# Patient Record
Sex: MALE | Race: WHITE | NOT HISPANIC OR LATINO | Employment: OTHER | URBAN - METROPOLITAN AREA
[De-identification: names, ages, dates, MRNs, and addresses within clinical notes are randomized per-mention and may not be internally consistent; named-entity substitution may affect disease eponyms.]

---

## 2017-02-02 RX ORDER — METFORMIN HYDROCHLORIDE 500 MG/1
TABLET, EXTENDED RELEASE ORAL
Qty: 360 TABLET | Refills: 0 | Status: SHIPPED | OUTPATIENT
Start: 2017-02-02 | End: 2017-05-01 | Stop reason: SDUPTHER

## 2017-02-13 ENCOUNTER — PATIENT OUTREACH (OUTPATIENT)
Dept: ADMINISTRATIVE | Facility: HOSPITAL | Age: 62
End: 2017-02-13

## 2017-02-13 NOTE — LETTER
February 21, 2017    Mikey Ramirez  [address] [facility] [address]             [facility]  [address]  [facility] [address]  Phone: [phone] Dear Mr. Ramirez:    We have tried to reach you by mychart unsuccessfully.          [facility] is committed to your overall health.  To help you get the most out of each of your visits, we will review your information to make sure you are up to date on all of your recommended tests and/or procedures.       Dr. Ortega has found that you may be due for One time Hepatitis C screening lab test ( a viral condition that harms the liver), tetanus vaccine, colonoscopy, annual dilated eye exam, flu vaccine, hemoglobin A1C, lipid panel.     If you have had any of the above done at another facility, please bring the records or information with you so that your record at [facility] will be complete.     If you are currently taking medication, please bring it with you to your appointment for review.     Nnamdi Avilez LPN Clinical Care Coordinator   [facility] [address] [facility] [address]   [place] [address]   [phone] (p)   [phone] (f)

## 2017-02-16 ENCOUNTER — PATIENT MESSAGE (OUTPATIENT)
Dept: FAMILY MEDICINE | Facility: CLINIC | Age: 62
End: 2017-02-16

## 2017-03-06 RX ORDER — ESOMEPRAZOLE MAGNESIUM 40 MG/1
CAPSULE, DELAYED RELEASE ORAL
Qty: 90 CAPSULE | Refills: 0 | Status: SHIPPED | OUTPATIENT
Start: 2017-03-06 | End: 2017-06-02 | Stop reason: SDUPTHER

## 2017-03-14 RX ORDER — PRAVASTATIN SODIUM 40 MG/1
TABLET ORAL
Qty: 90 TABLET | Refills: 0 | Status: SHIPPED | OUTPATIENT
Start: 2017-03-14 | End: 2017-09-19 | Stop reason: SDUPTHER

## 2017-03-23 RX ORDER — LISINOPRIL 10 MG/1
TABLET ORAL
Qty: 90 TABLET | Refills: 0 | Status: SHIPPED | OUTPATIENT
Start: 2017-03-23 | End: 2017-06-21 | Stop reason: SDUPTHER

## 2017-04-21 RX ORDER — INSULIN GLARGINE 100 [IU]/ML
INJECTION, SOLUTION SUBCUTANEOUS
Qty: 45 ML | Refills: 1 | Status: SHIPPED | OUTPATIENT
Start: 2017-04-21 | End: 2017-10-18 | Stop reason: SDUPTHER

## 2017-05-02 RX ORDER — METFORMIN HYDROCHLORIDE 500 MG/1
TABLET, EXTENDED RELEASE ORAL
Qty: 360 TABLET | Refills: 0 | Status: SHIPPED | OUTPATIENT
Start: 2017-05-02 | End: 2018-02-11 | Stop reason: SDUPTHER

## 2017-05-11 ENCOUNTER — PATIENT OUTREACH (OUTPATIENT)
Dept: ADMINISTRATIVE | Facility: HOSPITAL | Age: 62
End: 2017-05-11

## 2017-05-11 NOTE — LETTER
May 17, 2017    Mikey Ramirez  [address] [facility] [address]             [facility]  [address]  [facility] [address]  Phone: [phone] Dear Mr. Ramirez:    We have tried to reach you by mychart unsuccessfully.    [facility] committed to your overall health.  To help you get the most out of each of your visits, we will review your information to make sure you are up to date on all of your recommended tests and/or procedures.       Dr. Alphonso Ortega has found that you may be due for your fasting cholesterol and diabetes labs, your annual diabetic eye and foot exams, colon cancer screening, hepatitis C screening, and possibly a tetanus immunization.     If you have had any of the above done at another facility, please bring the records or information with you so that your record at [facility] will be complete.  If you would like to schedule any of these, please contact me.     If you are currently taking medication, please bring it with you to your appointment for review.     If you have any questions or concerns, please don't hesitate to call.    Thank you for letting us care for you,  Mally Paige LPN Clinical Care Coordinator  [facility] [place]  [phone]

## 2017-06-02 RX ORDER — ESOMEPRAZOLE MAGNESIUM 40 MG/1
CAPSULE, DELAYED RELEASE ORAL
Qty: 90 CAPSULE | Refills: 0 | Status: SHIPPED | OUTPATIENT
Start: 2017-06-02 | End: 2017-08-31 | Stop reason: SDUPTHER

## 2017-06-12 RX ORDER — PRAVASTATIN SODIUM 40 MG/1
TABLET ORAL
Qty: 90 TABLET | Refills: 1 | Status: SHIPPED | OUTPATIENT
Start: 2017-06-12 | End: 2017-12-09 | Stop reason: SDUPTHER

## 2017-06-21 RX ORDER — LISINOPRIL 10 MG/1
TABLET ORAL
Qty: 90 TABLET | Refills: 0 | Status: SHIPPED | OUTPATIENT
Start: 2017-06-21 | End: 2017-09-19 | Stop reason: SDUPTHER

## 2017-07-03 RX ORDER — PEN NEEDLE, DIABETIC 31 GX5/16"
NEEDLE, DISPOSABLE MISCELLANEOUS
Qty: 100 EACH | Refills: 0 | Status: SHIPPED | OUTPATIENT
Start: 2017-07-03 | End: 2017-10-01 | Stop reason: SDUPTHER

## 2017-08-11 ENCOUNTER — PATIENT OUTREACH (OUTPATIENT)
Dept: ADMINISTRATIVE | Facility: HOSPITAL | Age: 62
End: 2017-08-11

## 2017-08-11 NOTE — LETTER
August 15, 2017    Mikey Ramirez  [address] [facility] [address]             [facility]  [address]  [facility] [address]  Phone: [phone] Dear Mr. Ramirez:    We have tried to reach you by mychart unsuccessfully.    [facility] committed to your overall health.  To help you get the most out of each of your visits, we will review your information to make sure you are up to date on all of your recommended tests and/or procedures.       Dr. Alphonso Ortega has found that you may be due for your fasting cholesterol and diabetes labs, a urine test for your kidneys, your annual diabetic eye and foot exams, hepatitis C screening, colon cancer screening, and possibly a tetanus immunization.     If you have had any of the above done at another facility, please bring the records or information with you so that your record at [facility] will be complete.  If you would like to schedule any of these, please contact me.     If you are currently taking medication, please bring it with you to your appointment for review.     If you have any questions or concerns, please don't hesitate to call.    Thank you for letting us care for you,  Mally Paige LPN Clinical Care Coordinator  [facility]  [phone]

## 2017-08-31 RX ORDER — ESOMEPRAZOLE MAGNESIUM 40 MG/1
CAPSULE, DELAYED RELEASE ORAL
Qty: 90 CAPSULE | Refills: 0 | Status: SHIPPED | OUTPATIENT
Start: 2017-08-31 | End: 2021-06-10

## 2017-09-19 ENCOUNTER — TELEPHONE (OUTPATIENT)
Dept: FAMILY MEDICINE | Facility: CLINIC | Age: 62
End: 2017-09-19

## 2017-09-19 DIAGNOSIS — E78.5 HYPERLIPIDEMIA, UNSPECIFIED HYPERLIPIDEMIA TYPE: ICD-10-CM

## 2017-09-19 DIAGNOSIS — I10 ESSENTIAL HYPERTENSION: ICD-10-CM

## 2017-09-19 RX ORDER — LISINOPRIL 10 MG/1
TABLET ORAL
Qty: 30 TABLET | Refills: 0 | Status: SHIPPED | OUTPATIENT
Start: 2017-09-19 | End: 2017-10-05 | Stop reason: SDUPTHER

## 2017-09-19 NOTE — PROGRESS NOTES
Refill Authorization Note     is requesting a refill authorization.    Brief assessment and rational for refill: APPROVE: prr  Name of medication: LISINOPRIL TABS 10MG  How patient will take medication: t1t po daily   Amount/Quantity of medication ordered: 30d           Refills Authorized: Yes  If authorized number of refills: 0     Medication-related problems identified: Medication education needs  Medication Therapy Plan: Pt needs urgent labs.  Will approve 30d supply only.  Pending new CMP.  Will f/u in 1 mo.  BP uncontrolled.  Per chart review pt is difficult to schedule. D'c duplicate meds  Comments:   Lab Results   Component Value Date    CREATININE 0.8 09/24/2015    BUN 19 09/24/2015     09/24/2015    K 4.7 09/24/2015     09/24/2015    CO2 26 09/24/2015      BP Readings from Last 3 Encounters:   05/19/16 (!) 160/70   09/24/15 (!) 146/86   01/09/15 (!) 152/94

## 2017-09-20 NOTE — TELEPHONE ENCOUNTER
Medication refilled. Please make certain the patient comes in for labs before his scheduled appointment next month.

## 2017-09-20 NOTE — TELEPHONE ENCOUNTER
Spoke w/ pt. Informed pt about lab orders in place. Pt verbalized understanding. Pt then stated that he will call the [facility] later to schedule his appointment b/c he did not know his schedule at this time.

## 2017-10-02 RX ORDER — PEN NEEDLE, DIABETIC 31 GX5/16"
NEEDLE, DISPOSABLE MISCELLANEOUS
Qty: 100 EACH | Refills: 3 | Status: SHIPPED | OUTPATIENT
Start: 2017-10-02 | End: 2021-06-10

## 2017-10-02 NOTE — PROGRESS NOTES
Refill Authorization Note     is requesting a refill authorization.    Brief assessment and rational for refill: APPROVE: prr  Name of medication: BD PEN WEI UF SHORT 8MM 100'S 31G5/16  How patient will take medication: test daily   Amount/Quantity of medication ordered: 100  Medication reconciliation completed: No        Refills Authorized: Yes  If authorized number of refills: 3        Medication Therapy Plan: Last a1c uncontrolled.  Will approve for 12mo   Comments:   Lab Results   Component Value Date    HGBA1C 9.6 (H) 05/19/2016

## 2017-10-05 ENCOUNTER — TELEPHONE (OUTPATIENT)
Dept: FAMILY MEDICINE | Facility: CLINIC | Age: 62
End: 2017-10-05

## 2017-10-05 DIAGNOSIS — I10 ESSENTIAL HYPERTENSION: ICD-10-CM

## 2017-10-05 NOTE — TELEPHONE ENCOUNTER
Received fax from [employer] stating that Nexium is a non preferred medication.  1. Has patient ever tried Prilosec or Protonix?  2. If pt has not tried either which would he prefer?

## 2017-10-05 NOTE — LETTER
October 11, 2017    Mikey Ramirez  [address] [facility] [address]             [facility]  [address]  [facility] [address]  Phone: [phone]  Fax: [phone] Dear Mr. Ramirez:    We have tried to reach you in regards to  your prescription Nexium. It is no longer covered by your insurance. Can you please give our office a call.     If you have any questions or concerns, please don't hesitate to call.    Sincerely,        Lawanda Rajan MA

## 2017-10-06 RX ORDER — LISINOPRIL 10 MG/1
TABLET ORAL
Qty: 30 TABLET | Refills: 0 | Status: SHIPPED | OUTPATIENT
Start: 2017-10-06 | End: 2018-02-11 | Stop reason: SDUPTHER

## 2017-10-06 NOTE — PROGRESS NOTES
Refill Authorization Note     is requesting a refill authorization.    Brief assessment and rational for refill: APPROVE: prr  Name of medication: LISINOPRIL TABS 10MG  How patient will take medication: t1t po daily   Amount/Quantity of medication ordered: 30d   Medication reconciliation completed: No        Refills Authorized: Yes  If authorized number of refills: 0        Medication Therapy Plan: Pt needs updated labs and is supposed to be informed.  Has scheduled visit and in 3 weeks.  Will approve for 30d.  Comments:   Lab Results   Component Value Date    CREATININE 0.8 09/24/2015    BUN 19 09/24/2015     09/24/2015    K 4.7 09/24/2015     09/24/2015    CO2 26 09/24/2015      BP Readings from Last 3 Encounters:   05/19/16 (!) 160/70   09/24/15 (!) 146/86   01/09/15 (!) 152/94

## 2017-10-16 ENCOUNTER — PATIENT OUTREACH (OUTPATIENT)
Dept: ADMINISTRATIVE | Facility: HOSPITAL | Age: 62
End: 2017-10-16

## 2017-10-16 NOTE — LETTER
October 23, 2017    Mikey Ramirez  [address] [facility] [address]             [facility]  [address]  [facility] [address]  Phone: [phone] Dear Mr. Ramirez:    We have tried to reach you by mychart unsuccessfully.    [facility] committed to your overall health.  To help you get the most out of each of your visits, we will review your information to make sure you are up to date on all of your recommended tests and/or procedures.       Dr. Alphonso Ortega has found that your chart shows you may be due for your fasting cholesterol and diabetes labs, a urine test for your kidneys, your annual diabetic eye and foot exams, hepatitis C screening, colon cancer screening, and possibly some immunizations (tetanus, flu, and shingles).     If you have had any of the above done at another facility, please bring the records or information with you so that your record at [facility] will be complete.  If you would like to schedule any of these, please contact me.     If you are currently taking medication, please bring it with you to your appointment for review.     If you have any questions or concerns, please don't hesitate to call.    Thank you for letting us care for you,  Mally Paige LPN Clinical Care Coordinator  [facility] [place]  [phone]

## 2017-10-16 NOTE — PROGRESS NOTES
Pre-visit Health Maintenance Review    Patient due for diabetic eye exam.      Upcoming Primary Care visit: Jordan 3:40 - too late to schedule.

## 2017-10-18 RX ORDER — INSULIN GLARGINE 100 [IU]/ML
INJECTION, SOLUTION SUBCUTANEOUS
Qty: 45 ML | Refills: 0 | Status: SHIPPED | OUTPATIENT
Start: 2017-10-18 | End: 2018-01-16 | Stop reason: SDUPTHER

## 2017-12-11 ENCOUNTER — PATIENT OUTREACH (OUTPATIENT)
Dept: ADMINISTRATIVE | Facility: HOSPITAL | Age: 62
End: 2017-12-11

## 2017-12-11 DIAGNOSIS — E11.00 TYPE 2 DIABETES MELLITUS WITH HYPEROSMOLARITY WITHOUT COMA, UNSPECIFIED LONG TERM INSULIN USE STATUS: Primary | ICD-10-CM

## 2017-12-11 RX ORDER — PRAVASTATIN SODIUM 40 MG/1
TABLET ORAL
Qty: 90 TABLET | Refills: 0 | Status: SHIPPED | OUTPATIENT
Start: 2017-12-11 | End: 2018-02-21 | Stop reason: SDUPTHER

## 2017-12-11 NOTE — PROGRESS NOTES
Pre-visit Health Maintenance Review    Patient due for diabetic eye exam.  Called to schedule retinal camera appointment in [place] if not already done outside of [facility].    Upcoming Primary Care visit: Jordan 12/26/17 8am

## 2017-12-11 NOTE — PROGRESS NOTES
Refill Authorization Note     is requesting a refill authorization.    Brief assessment and rationale for refill: APPROVE: pt needs labs  Amount/Quantity of medication ordered: 90d         Refills Authorized: Yes  If authorized number of refills: 0        Medication-related problems identified: Requires labs  Medication Therapy Plan: Will RTC for annual soon; lipids and other labs already ordered; NTBS   Name and strength of medication: PRAVASTATIN TABS 40MG  How patient will take medication: t1t po daily   Medication reconciliation completed: No  Comments:   Lab Results   Component Value Date    CHOL 169 09/24/2015    CHOL 223 (H) 01/09/2015    CHOL 150 09/30/2013     Lab Results   Component Value Date    HDL 54 09/24/2015    HDL 62 01/09/2015    HDL 52 09/30/2013     Lab Results   Component Value Date    LDLCALC 100.2 09/24/2015    LDLCALC 142.0 01/09/2015    LDLCALC 88.0 09/30/2013     Lab Results   Component Value Date    TRIG 74 09/24/2015    TRIG 95 01/09/2015    TRIG 50 09/30/2013     Lab Results   Component Value Date    CHOLHDL 32.0 09/24/2015    CHOLHDL 27.8 01/09/2015    CHOLHDL 34.7 09/30/2013

## 2017-12-12 ENCOUNTER — PATIENT MESSAGE (OUTPATIENT)
Dept: ADMINISTRATIVE | Facility: HOSPITAL | Age: 62
End: 2017-12-12

## 2017-12-12 DIAGNOSIS — Z11.59 ENCOUNTER FOR HEPATITIS C SCREENING TEST FOR LOW RISK PATIENT: ICD-10-CM

## 2017-12-12 DIAGNOSIS — E11.00 TYPE 2 DIABETES MELLITUS WITH HYPEROSMOLARITY WITHOUT COMA, UNSPECIFIED LONG TERM INSULIN USE STATUS: Primary | ICD-10-CM

## 2017-12-12 DIAGNOSIS — Z12.11 COLON CANCER SCREENING: ICD-10-CM

## 2017-12-21 ENCOUNTER — TELEPHONE (OUTPATIENT)
Dept: FAMILY MEDICINE | Facility: CLINIC | Age: 62
End: 2017-12-21

## 2018-01-03 ENCOUNTER — PATIENT MESSAGE (OUTPATIENT)
Dept: GASTROENTEROLOGY | Facility: CLINIC | Age: 63
End: 2018-01-03

## 2018-01-16 NOTE — LETTER
January 23, 2018    Mikey Ramirez  [address] [facility] [address]             [facility]  [address]  [facility] [address]  Phone: [phone]  Fax: [phone] Dear Mr. Ramirez:    We have been trying to contact you to schedule labs before your follow up appointment. Please contact the office at [phone] to schedule.       If you have any questions or concerns, please don't hesitate to call.    Sincerely,        Batsheva Eldridge MA

## 2018-01-17 RX ORDER — INSULIN GLARGINE 100 [IU]/ML
INJECTION, SOLUTION SUBCUTANEOUS
Qty: 45 ML | Refills: 0 | Status: SHIPPED | OUTPATIENT
Start: 2018-01-17 | End: 2021-06-10

## 2018-01-17 NOTE — PROGRESS NOTES
Refill Authorization Note     is requesting a refill authorization.    Brief assessment and rationale for refill: APPROVE: pt needs all labs  Amount/Quantity of medication ordered: 90d        Refills Authorized: Yes  If authorized number of refills: 0        Medication-related problems identified: Requires labs  Medication Therapy Plan: Outdated for all labs; will RTC soon for annual.  CMP; hba1c; lipids NTBS already ordered previously  Name and strength of medication: LANTUS SOLOSTAR PEN 100U/ML  How patient will take medication: 45 units daily  Medication reconciliation completed: No  Comments:  Lab Results   Component Value Date    HGBA1C 9.6 (H) 05/19/2016      Lab Results   Component Value Date    CREATININE 0.8 09/24/2015    BUN 19 09/24/2015     09/24/2015    K 4.7 09/24/2015     09/24/2015    CO2 26 09/24/2015

## 2018-01-18 NOTE — TELEPHONE ENCOUNTER
Attempted to contact pt to inform him that he needs to schedule labs prior to his follow up scheduled for 03/05/2018.    No answer; left message to return call to schedule.

## 2018-01-22 NOTE — TELEPHONE ENCOUNTER
Attempted to contact pt to schedule labs prior to follow up scheduled for 03/05/2018/    No answer; left message to return call to schedule.

## 2018-01-23 NOTE — TELEPHONE ENCOUNTER
Attempted to contact pt.     No answer; left message to return call to schedule labs.     3 attempts made.     Letter sent to pt.

## 2018-02-11 DIAGNOSIS — I10 ESSENTIAL HYPERTENSION: ICD-10-CM

## 2018-02-11 NOTE — LETTER
February 16, 2018    Mikey Ramirez  [address] [facility] [address]             [facility]  [address]  [facility] [address]  Phone: [phone]  Fax: [phone] Dear Mr. Ramirez:    We have been trying to contact you to schedule labs prior to your follow up appointment scheduled for 03/05/2018. Please contact the office at [phone] to schedule labs.       If you have any questions or concerns, please don't hesitate to call.    Sincerely,        Batsheva Eldridge MA

## 2018-02-12 RX ORDER — METFORMIN HYDROCHLORIDE 500 MG/1
1000 TABLET, EXTENDED RELEASE ORAL 2 TIMES DAILY
Qty: 120 TABLET | Refills: 0 | Status: SHIPPED | OUTPATIENT
Start: 2018-02-12 | End: 2021-06-10

## 2018-02-12 RX ORDER — LISINOPRIL 10 MG/1
10 TABLET ORAL DAILY
Qty: 30 TABLET | Refills: 0 | Status: SHIPPED | OUTPATIENT
Start: 2018-02-12 | End: 2021-06-10

## 2018-02-12 NOTE — PROGRESS NOTES
Refill Authorization Note     is requesting a refill authorization.    Brief assessment and rationale for refill: APPROVE; needs labs done and also may be nonadherent  Amount/Quantity of medication ordered: 90d        Refills Authorized: Yes  If authorized number of refills: 0        Medication-related problems identified: Requires labs  Medication Therapy Plan: Labs already ordered; pt didnt get them done; NTBS.  Converting to 30d protocols  Name and strength of medication: metformin 500 mg / lisinopril  How patient will take medication: UTD  Medication reconciliation completed: No  Comments:   Lab Results   Component Value Date    CREATININE 0.8 09/24/2015    BUN 19 09/24/2015     09/24/2015    K 4.7 09/24/2015     09/24/2015    CO2 26 09/24/2015      Lab Results   Component Value Date    HGBA1C 9.6 (H) 05/19/2016      BP Readings from Last 3 Encounters:   05/19/16 (!) 160/70   09/24/15 (!) 146/86   01/09/15 (!) 152/94

## 2018-02-14 NOTE — TELEPHONE ENCOUNTER
Attempted to contact pt to schedule lab apt prior to follow up on 03/05/2018.     No answer; left message to return call to schedule.

## 2018-02-15 NOTE — TELEPHONE ENCOUNTER
Attempted to contact pt to schedule labs prior to follow up apt.     No answer; left message to return call to schedule.

## 2018-02-16 NOTE — TELEPHONE ENCOUNTER
Attempted to contact pt to schedule labs prior to follow up.    No answer; left message to return call.     Sent letter to pt.

## 2018-02-19 ENCOUNTER — PATIENT OUTREACH (OUTPATIENT)
Dept: ADMINISTRATIVE | Facility: HOSPITAL | Age: 63
End: 2018-02-19

## 2018-02-19 NOTE — PROGRESS NOTES
Pre-visit Health Maintenance Review    Patient due for diabetic eye exam.  Called to schedule retinal camera appointment in [place] if not already done outside of [facility].    Upcoming Primary Care visit: [place] 3/5/17 8:20am

## 2018-02-21 NOTE — PROGRESS NOTES
Refill Authorization Note     is requesting a refill authorization.    Brief assessment and rationale for refill: DEFER: needs labs did not show to lab appt and also letter was sent out  Amount/Quantity of medication ordered: 30d        Refills Authorized: Yes  If authorized number of refills: 0        Medication-related problems identified: Non-adherence (knowledge deficit) non-intentional  Medication Therapy Plan: Pt no show'd lab and not responding to phone calls to scheduled  Name and strength of medication: PRAVASTATIN TABS 40MG  How patient will take medication: t1t po daily   Medication reconciliation completed: No  Comments:   Lab Results   Component Value Date    CHOL 169 09/24/2015    CHOL 223 (H) 01/09/2015    CHOL 150 09/30/2013     Lab Results   Component Value Date    HDL 54 09/24/2015    HDL 62 01/09/2015    HDL 52 09/30/2013     Lab Results   Component Value Date    LDLCALC 100.2 09/24/2015    LDLCALC 142.0 01/09/2015    LDLCALC 88.0 09/30/2013     Lab Results   Component Value Date    TRIG 74 09/24/2015    TRIG 95 01/09/2015    TRIG 50 09/30/2013     Lab Results   Component Value Date    CHOLHDL 32.0 09/24/2015    CHOLHDL 27.8 01/09/2015    CHOLHDL 34.7 09/30/2013

## 2018-02-22 ENCOUNTER — PATIENT MESSAGE (OUTPATIENT)
Dept: ADMINISTRATIVE | Facility: HOSPITAL | Age: 63
End: 2018-02-22

## 2018-02-22 DIAGNOSIS — E11.00 TYPE 2 DIABETES MELLITUS WITH HYPEROSMOLARITY WITHOUT COMA, UNSPECIFIED LONG TERM INSULIN USE STATUS: Primary | ICD-10-CM

## 2018-02-23 RX ORDER — PRAVASTATIN SODIUM 40 MG/1
40 TABLET ORAL NIGHTLY
Qty: 30 TABLET | Refills: 0 | Status: SHIPPED | OUTPATIENT
Start: 2018-02-23 | End: 2021-06-10

## 2018-02-26 ENCOUNTER — OFFICE VISIT (OUTPATIENT)
Dept: OPTOMETRY | Facility: CLINIC | Age: 63
End: 2018-02-26
Payer: OTHER GOVERNMENT

## 2018-02-26 ENCOUNTER — LAB VISIT (OUTPATIENT)
Dept: LAB | Facility: HOSPITAL | Age: 63
End: 2018-02-26
Attending: FAMILY MEDICINE
Payer: OTHER GOVERNMENT

## 2018-02-26 DIAGNOSIS — Z11.59 ENCOUNTER FOR HEPATITIS C SCREENING TEST FOR LOW RISK PATIENT: ICD-10-CM

## 2018-02-26 DIAGNOSIS — H52.203 HYPEROPIA OF BOTH EYES WITH ASTIGMATISM: ICD-10-CM

## 2018-02-26 DIAGNOSIS — H52.03 HYPEROPIA OF BOTH EYES WITH ASTIGMATISM: ICD-10-CM

## 2018-02-26 DIAGNOSIS — Z13.5 GLAUCOMA SCREENING: ICD-10-CM

## 2018-02-26 DIAGNOSIS — E78.5 HYPERLIPIDEMIA, UNSPECIFIED HYPERLIPIDEMIA TYPE: ICD-10-CM

## 2018-02-26 DIAGNOSIS — E11.9 DIABETES MELLITUS TYPE 2 WITHOUT RETINOPATHY: Primary | ICD-10-CM

## 2018-02-26 DIAGNOSIS — I10 ESSENTIAL HYPERTENSION: ICD-10-CM

## 2018-02-26 DIAGNOSIS — Z96.1 BILATERAL PSEUDOPHAKIA: ICD-10-CM

## 2018-02-26 DIAGNOSIS — H43.813 POSTERIOR VITREOUS DETACHMENT, BILATERAL: ICD-10-CM

## 2018-02-26 LAB
ALBUMIN SERPL BCP-MCNC: 3.4 G/DL
ALP SERPL-CCNC: 69 U/L
ALT SERPL W/O P-5'-P-CCNC: 64 U/L
ANION GAP SERPL CALC-SCNC: 9 MMOL/L
AST SERPL-CCNC: 38 U/L
BILIRUB SERPL-MCNC: 0.5 MG/DL
BUN SERPL-MCNC: 17 MG/DL
CALCIUM SERPL-MCNC: 9.8 MG/DL
CHLORIDE SERPL-SCNC: 105 MMOL/L
CHOLEST SERPL-MCNC: 179 MG/DL
CHOLEST/HDLC SERPL: 3 {RATIO}
CO2 SERPL-SCNC: 26 MMOL/L
CREAT SERPL-MCNC: 0.7 MG/DL
EST. GFR  (AFRICAN AMERICAN): >60 ML/MIN/1.73 M^2
EST. GFR  (NON AFRICAN AMERICAN): >60 ML/MIN/1.73 M^2
ESTIMATED AVG GLUCOSE: 278 MG/DL
GLUCOSE SERPL-MCNC: 157 MG/DL
HBA1C MFR BLD HPLC: 11.3 %
HCV AB SERPL QL IA: NEGATIVE
HDLC SERPL-MCNC: 60 MG/DL
HDLC SERPL: 33.5 %
LDLC SERPL CALC-MCNC: 99.4 MG/DL
NONHDLC SERPL-MCNC: 119 MG/DL
POTASSIUM SERPL-SCNC: 4.6 MMOL/L
PROT SERPL-MCNC: 6.7 G/DL
SODIUM SERPL-SCNC: 140 MMOL/L
TRIGL SERPL-MCNC: 98 MG/DL

## 2018-02-26 PROCEDURE — 83036 HEMOGLOBIN GLYCOSYLATED A1C: CPT

## 2018-02-26 PROCEDURE — 99999 PR PBB SHADOW E&M-EST. PATIENT-LVL III: CPT | Mod: PBBFAC,,, | Performed by: OPTOMETRIST

## 2018-02-26 PROCEDURE — 36415 COLL VENOUS BLD VENIPUNCTURE: CPT | Mod: PO

## 2018-02-26 PROCEDURE — 80061 LIPID PANEL: CPT

## 2018-02-26 PROCEDURE — 99213 OFFICE O/P EST LOW 20 MIN: CPT | Mod: PBBFAC,PO | Performed by: OPTOMETRIST

## 2018-02-26 PROCEDURE — 92004 COMPRE OPH EXAM NEW PT 1/>: CPT | Mod: S$PBB,,, | Performed by: OPTOMETRIST

## 2018-02-26 PROCEDURE — 86803 HEPATITIS C AB TEST: CPT

## 2018-02-26 PROCEDURE — 80053 COMPREHEN METABOLIC PANEL: CPT

## 2018-02-26 PROCEDURE — 92015 DETERMINE REFRACTIVE STATE: CPT | Mod: ,,, | Performed by: OPTOMETRIST

## 2018-02-26 NOTE — PROGRESS NOTES
"HPI     Concerns About Ocular Health    Additional comments: DLE 9-12 (donezoe)             Diabetic Eye Exam    Additional comments: BSL fluctuates, uncontrolled           Blurred Vision    Additional comments: at near only -- "takes longer to focus after wearing   OTC readers all day"           Comments   Hemoglobin A1C       Date                     Value               Ref Range             Status                05/19/2016               9.6 (H)             4.5 - 6.2 %           Final                 09/24/2015               9.6 (H)             4.5 - 6.2 %           Final                 01/09/2015               8.8 (H)             4.5 - 6.2 %           Final            ----------    BP reported "havent' done it in a while"         Last edited by DANIELLA Castillo, OD on 2/26/2018  9:16 AM. (History)        ROS     Positive for: Eyes    Negative for: Constitutional, Gastrointestinal, Neurological, Skin,   Genitourinary, Musculoskeletal, HENT, Endocrine, Cardiovascular,   Respiratory, Psychiatric, Allergic/Imm, Heme/Lymph    Last edited by DANIELLA Castillo, OD on 2/26/2018  9:09 AM. (History)        Assessment /Plan     For exam results, see Encounter Report.    Diabetes mellitus type 2 without retinopathy    Posterior vitreous detachment, bilateral    Glaucoma screening    Bilateral pseudophakia    Hyperopia of both eyes with astigmatism      1. No ret/ no csme, gave info, control glucose, annual DFE  2. RD precautions given  3. Not suspect  4. Stable OU, s/p Yag  5. Updated specs rx, gave copy, fill prn    Discussed and educated patient on current findings /plan.  RTC 1 year, prn if any changes / issues                   "

## 2018-02-26 NOTE — LETTER
February 26, 2018      Alphonso Ortega MD  [address] [facility] [address] [place] [address]           [facility]  [address] [facility] [address] [place] [address]  Phone: [phone]  Fax: [phone]          Patient: Mikey Ramirez   MR Number: [id number]   YOB: 1955   Date of Visit: 2/26/2018       Dear Dr. Alphonso Ortega:    Thank you for referring Mikey Ramirez to me for evaluation. Attached you will find relevant portions of my assessment and plan of care.    If you have questions, please do not hesitate to call me. I look forward to following Mikey Ramirez along with you.    Sincerely,    DANIELLA Castillo, OD    Enclosure  CC:  No Recipients    If you would like to receive this communication electronically, please contact [email or web address] or [phone] to request more information on [employer] access.    For providers and/or their staff who would like to refer a patient to [facility], please contact us through our one-stop-shop provider referral line, [facility] , at [phone].    If you feel you have received this communication in error or would no longer like to receive these types of communications, please e-mail [email or web address]

## 2018-02-26 NOTE — TELEPHONE ENCOUNTER
Please schedule patient for labs (A1c, lipids, CMP).  Pt did not respond to phone calls and a letter was sent out, of note. (I'm not sure if Dr. Ortega wants to try again or to wait until the office visit).  I will clarify the next time I see him. Thanks

## 2018-03-02 ENCOUNTER — PATIENT MESSAGE (OUTPATIENT)
Dept: OPTOMETRY | Facility: CLINIC | Age: 63
End: 2018-03-02

## 2018-03-11 DIAGNOSIS — I10 ESSENTIAL HYPERTENSION: ICD-10-CM

## 2018-03-12 RX ORDER — LISINOPRIL 10 MG/1
TABLET ORAL
Qty: 30 TABLET | Refills: 0 | OUTPATIENT
Start: 2018-03-12

## 2018-03-12 RX ORDER — METFORMIN HYDROCHLORIDE 500 MG/1
TABLET, EXTENDED RELEASE ORAL
Qty: 120 TABLET | Refills: 0 | OUTPATIENT
Start: 2018-03-12

## 2018-03-12 RX ORDER — PRAVASTATIN SODIUM 40 MG/1
40 TABLET ORAL DAILY
Qty: 30 TABLET | Refills: 0 | OUTPATIENT
Start: 2018-03-12

## 2018-03-12 NOTE — PROGRESS NOTES
Refill Authorization Note     is requesting a refill authorization.    Brief assessment and rationale for refill: DEFER: pt cancelled appointment; labs updated; out of control  Amount/Quantity of medication ordered: 90d         Refills Authorized: Yes  If authorized number of refills: 0        Medication-related problems identified:   Requires appointment  Medication education needs  Dose adjustment  Medication Therapy Plan: Labs updated; will que protocol to schedule appt for 30d  Name and strength of medication: pravastatin / lisinopril / metformin  How patient will take medication: UTD  Medication reconciliation completed: No  Comments:   Lab Results   Component Value Date    CHOL 179 02/26/2018    CHOL 169 09/24/2015    CHOL 223 (H) 01/09/2015     Lab Results   Component Value Date    HDL 60 02/26/2018    HDL 54 09/24/2015    HDL 62 01/09/2015     Lab Results   Component Value Date    LDLCALC 99.4 02/26/2018    LDLCALC 100.2 09/24/2015    LDLCALC 142.0 01/09/2015     Lab Results   Component Value Date    TRIG 98 02/26/2018    TRIG 74 09/24/2015    TRIG 95 01/09/2015     Lab Results   Component Value Date    CHOLHDL 33.5 02/26/2018    CHOLHDL 32.0 09/24/2015    CHOLHDL 27.8 01/09/2015      BP Readings from Last 3 Encounters:   05/19/16 (!) 160/70   09/24/15 (!) 146/86   01/09/15 (!) 152/94      Pulse Readings from Last 3 Encounters:   05/19/16 65   09/24/15 72   01/09/15 104      Lab Results   Component Value Date    HGBA1C 11.3 (H) 02/26/2018

## 2018-03-13 NOTE — TELEPHONE ENCOUNTER
Attempted to contact pt to inform him that medication refills have been denied due to pt has not been seen in over a year and needs to schedule apt.     No answer; left message to return call to schedule.

## 2018-03-15 NOTE — TELEPHONE ENCOUNTER
Attempted to contact pt.     No answer; left message to return call.     3 attempts have been made.     How would you like to proceed?    Thank you.

## 2018-04-17 RX ORDER — INSULIN GLARGINE 100 [IU]/ML
INJECTION, SOLUTION SUBCUTANEOUS
Qty: 45 ML | Refills: 0 | OUTPATIENT
Start: 2018-04-17

## 2021-06-10 ENCOUNTER — OFFICE VISIT (OUTPATIENT)
Dept: FAMILY MEDICINE | Facility: CLINIC | Age: 66
End: 2021-06-10
Payer: OTHER GOVERNMENT

## 2021-06-10 ENCOUNTER — LAB VISIT (OUTPATIENT)
Dept: LAB | Facility: HOSPITAL | Age: 66
End: 2021-06-10
Attending: INTERNAL MEDICINE
Payer: OTHER GOVERNMENT

## 2021-06-10 VITALS
BODY MASS INDEX: 23.67 KG/M2 | OXYGEN SATURATION: 99 % | HEART RATE: 77 BPM | DIASTOLIC BLOOD PRESSURE: 88 MMHG | SYSTOLIC BLOOD PRESSURE: 150 MMHG | WEIGHT: 160.25 LBS

## 2021-06-10 DIAGNOSIS — E11.65 UNCONTROLLED TYPE 2 DIABETES MELLITUS WITH HYPERGLYCEMIA: ICD-10-CM

## 2021-06-10 DIAGNOSIS — G62.9 NEUROPATHY: ICD-10-CM

## 2021-06-10 DIAGNOSIS — H53.8 BLURRY VISION: ICD-10-CM

## 2021-06-10 DIAGNOSIS — R63.4 UNINTENDED WEIGHT LOSS: Primary | ICD-10-CM

## 2021-06-10 DIAGNOSIS — R63.4 UNINTENDED WEIGHT LOSS: ICD-10-CM

## 2021-06-10 DIAGNOSIS — R29.898 WEAKNESS OF LOWER EXTREMITY, UNSPECIFIED LATERALITY: ICD-10-CM

## 2021-06-10 LAB
ERYTHROCYTE [DISTWIDTH] IN BLOOD BY AUTOMATED COUNT: 13.2 % (ref 11.5–14.5)
HCT VFR BLD AUTO: 44.1 % (ref 40–54)
HGB BLD-MCNC: 14.7 G/DL (ref 14–18)
MCH RBC QN AUTO: 27.8 PG (ref 27–31)
MCHC RBC AUTO-ENTMCNC: 33.3 G/DL (ref 32–36)
MCV RBC AUTO: 84 FL (ref 82–98)
PLATELET # BLD AUTO: 308 K/UL (ref 150–450)
PMV BLD AUTO: 11.6 FL (ref 9.2–12.9)
RBC # BLD AUTO: 5.28 M/UL (ref 4.6–6.2)
WBC # BLD AUTO: 4.83 K/UL (ref 3.9–12.7)

## 2021-06-10 PROCEDURE — 99999 PR PBB SHADOW E&M-EST. PATIENT-LVL III: CPT | Mod: PBBFAC,,, | Performed by: INTERNAL MEDICINE

## 2021-06-10 PROCEDURE — 82550 ASSAY OF CK (CPK): CPT | Performed by: INTERNAL MEDICINE

## 2021-06-10 PROCEDURE — 99999 PR PBB SHADOW E&M-EST. PATIENT-LVL III: ICD-10-PCS | Mod: PBBFAC,,, | Performed by: INTERNAL MEDICINE

## 2021-06-10 PROCEDURE — 84153 ASSAY OF PSA TOTAL: CPT | Performed by: INTERNAL MEDICINE

## 2021-06-10 PROCEDURE — 99204 PR OFFICE/OUTPT VISIT, NEW, LEVL IV, 45-59 MIN: ICD-10-PCS | Mod: S$PBB,,, | Performed by: INTERNAL MEDICINE

## 2021-06-10 PROCEDURE — 86703 HIV-1/HIV-2 1 RESULT ANTBDY: CPT | Performed by: INTERNAL MEDICINE

## 2021-06-10 PROCEDURE — 82570 ASSAY OF URINE CREATININE: CPT | Performed by: INTERNAL MEDICINE

## 2021-06-10 PROCEDURE — 80061 LIPID PANEL: CPT | Performed by: INTERNAL MEDICINE

## 2021-06-10 PROCEDURE — 82607 VITAMIN B-12: CPT | Performed by: INTERNAL MEDICINE

## 2021-06-10 PROCEDURE — 99213 OFFICE O/P EST LOW 20 MIN: CPT | Mod: PBBFAC,PO | Performed by: INTERNAL MEDICINE

## 2021-06-10 PROCEDURE — 80053 COMPREHEN METABOLIC PANEL: CPT | Performed by: INTERNAL MEDICINE

## 2021-06-10 PROCEDURE — 85027 COMPLETE CBC AUTOMATED: CPT | Performed by: INTERNAL MEDICINE

## 2021-06-10 PROCEDURE — 83036 HEMOGLOBIN GLYCOSYLATED A1C: CPT | Performed by: INTERNAL MEDICINE

## 2021-06-10 PROCEDURE — 36415 COLL VENOUS BLD VENIPUNCTURE: CPT | Mod: PO | Performed by: INTERNAL MEDICINE

## 2021-06-10 PROCEDURE — 99204 OFFICE O/P NEW MOD 45 MIN: CPT | Mod: S$PBB,,, | Performed by: INTERNAL MEDICINE

## 2021-06-10 PROCEDURE — 82043 UR ALBUMIN QUANTITATIVE: CPT | Performed by: INTERNAL MEDICINE

## 2021-06-10 PROCEDURE — 84443 ASSAY THYROID STIM HORMONE: CPT | Performed by: INTERNAL MEDICINE

## 2021-06-10 RX ORDER — INSULIN PUMP SYRINGE, 3 ML
EACH MISCELLANEOUS
Qty: 1 EACH | Refills: 1 | Status: SHIPPED | OUTPATIENT
Start: 2021-06-10

## 2021-06-10 RX ORDER — LANCETS
EACH MISCELLANEOUS
Qty: 100 EACH | Refills: 1 | Status: SHIPPED | OUTPATIENT
Start: 2021-06-10 | End: 2021-12-09

## 2021-06-10 RX ORDER — GABAPENTIN 300 MG/1
300 CAPSULE ORAL 3 TIMES DAILY
Qty: 90 CAPSULE | Refills: 0 | Status: SHIPPED | OUTPATIENT
Start: 2021-06-10 | End: 2021-07-13

## 2021-06-11 ENCOUNTER — PATIENT MESSAGE (OUTPATIENT)
Dept: FAMILY MEDICINE | Facility: CLINIC | Age: 66
End: 2021-06-11

## 2021-06-11 LAB
ALBUMIN SERPL BCP-MCNC: 3.3 G/DL (ref 3.5–5.2)
ALBUMIN/CREAT UR: 56.6 UG/MG (ref 0–30)
ALP SERPL-CCNC: 78 U/L (ref 55–135)
ALT SERPL W/O P-5'-P-CCNC: 30 U/L (ref 10–44)
ANION GAP SERPL CALC-SCNC: 12 MMOL/L (ref 8–16)
AST SERPL-CCNC: 23 U/L (ref 10–40)
BILIRUB SERPL-MCNC: 0.8 MG/DL (ref 0.1–1)
BUN SERPL-MCNC: 10 MG/DL (ref 8–23)
CALCIUM SERPL-MCNC: 9.7 MG/DL (ref 8.7–10.5)
CHLORIDE SERPL-SCNC: 102 MMOL/L (ref 95–110)
CHOLEST SERPL-MCNC: 175 MG/DL (ref 120–199)
CHOLEST/HDLC SERPL: 2.6 {RATIO} (ref 2–5)
CK SERPL-CCNC: 109 U/L (ref 20–200)
CO2 SERPL-SCNC: 23 MMOL/L (ref 23–29)
COMPLEXED PSA SERPL-MCNC: 0.27 NG/ML (ref 0–4)
CREAT SERPL-MCNC: 0.7 MG/DL (ref 0.5–1.4)
CREAT UR-MCNC: 53 MG/DL (ref 23–375)
EST. GFR  (AFRICAN AMERICAN): >60 ML/MIN/1.73 M^2
EST. GFR  (NON AFRICAN AMERICAN): >60 ML/MIN/1.73 M^2
ESTIMATED AVG GLUCOSE: ABNORMAL MG/DL (ref 68–131)
GLUCOSE SERPL-MCNC: 300 MG/DL (ref 70–110)
HBA1C MFR BLD: >14 % (ref 4–5.6)
HDLC SERPL-MCNC: 68 MG/DL (ref 40–75)
HDLC SERPL: 38.9 % (ref 20–50)
HIV 1+2 AB+HIV1 P24 AG SERPL QL IA: NEGATIVE
LDLC SERPL CALC-MCNC: 94.8 MG/DL (ref 63–159)
MICROALBUMIN UR DL<=1MG/L-MCNC: 30 UG/ML
NONHDLC SERPL-MCNC: 107 MG/DL
POTASSIUM SERPL-SCNC: 5.2 MMOL/L (ref 3.5–5.1)
PROT SERPL-MCNC: 6 G/DL (ref 6–8.4)
SODIUM SERPL-SCNC: 137 MMOL/L (ref 136–145)
TRIGL SERPL-MCNC: 61 MG/DL (ref 30–150)
TSH SERPL DL<=0.005 MIU/L-ACNC: 1.35 UIU/ML (ref 0.4–4)
VIT B12 SERPL-MCNC: >2000 PG/ML (ref 210–950)

## 2021-06-11 RX ORDER — LISINOPRIL 20 MG/1
20 TABLET ORAL DAILY
Qty: 90 TABLET | Refills: 1 | Status: SHIPPED | OUTPATIENT
Start: 2021-06-11 | End: 2021-09-22 | Stop reason: SDUPTHER

## 2021-06-11 RX ORDER — ROSUVASTATIN CALCIUM 5 MG/1
5 TABLET, COATED ORAL DAILY
Qty: 90 TABLET | Refills: 1 | Status: SHIPPED | OUTPATIENT
Start: 2021-06-11 | End: 2021-09-22 | Stop reason: SDUPTHER

## 2021-06-13 RX ORDER — LISINOPRIL 20 MG/1
20 TABLET ORAL DAILY
Qty: 90 TABLET | Refills: 3 | Status: SHIPPED | OUTPATIENT
Start: 2021-06-13 | End: 2021-07-12

## 2021-07-12 ENCOUNTER — OFFICE VISIT (OUTPATIENT)
Dept: FAMILY MEDICINE | Facility: CLINIC | Age: 66
End: 2021-07-12
Payer: OTHER GOVERNMENT

## 2021-07-12 ENCOUNTER — PATIENT MESSAGE (OUTPATIENT)
Dept: FAMILY MEDICINE | Facility: CLINIC | Age: 66
End: 2021-07-12

## 2021-07-12 ENCOUNTER — TELEPHONE (OUTPATIENT)
Dept: FAMILY MEDICINE | Facility: CLINIC | Age: 66
End: 2021-07-12

## 2021-07-12 VITALS
OXYGEN SATURATION: 98 % | SYSTOLIC BLOOD PRESSURE: 120 MMHG | BODY MASS INDEX: 24.03 KG/M2 | HEART RATE: 84 BPM | HEIGHT: 69 IN | WEIGHT: 162.25 LBS | DIASTOLIC BLOOD PRESSURE: 80 MMHG

## 2021-07-12 DIAGNOSIS — Z12.11 SCREENING FOR COLON CANCER: ICD-10-CM

## 2021-07-12 DIAGNOSIS — E11.65 UNCONTROLLED TYPE 2 DIABETES MELLITUS WITH HYPERGLYCEMIA: Primary | ICD-10-CM

## 2021-07-12 PROCEDURE — 99213 PR OFFICE/OUTPT VISIT, EST, LEVL III, 20-29 MIN: ICD-10-PCS | Mod: S$PBB,,, | Performed by: INTERNAL MEDICINE

## 2021-07-12 PROCEDURE — 99999 PR PBB SHADOW E&M-EST. PATIENT-LVL IV: CPT | Mod: PBBFAC,,, | Performed by: INTERNAL MEDICINE

## 2021-07-12 PROCEDURE — 99214 OFFICE O/P EST MOD 30 MIN: CPT | Mod: PBBFAC,PO | Performed by: INTERNAL MEDICINE

## 2021-07-12 PROCEDURE — 99999 PR PBB SHADOW E&M-EST. PATIENT-LVL IV: ICD-10-PCS | Mod: PBBFAC,,, | Performed by: INTERNAL MEDICINE

## 2021-07-12 PROCEDURE — 99213 OFFICE O/P EST LOW 20 MIN: CPT | Mod: S$PBB,,, | Performed by: INTERNAL MEDICINE

## 2021-07-12 RX ORDER — ASPIRIN 81 MG/1
81 TABLET ORAL DAILY
COMMUNITY

## 2021-08-02 ENCOUNTER — TELEPHONE (OUTPATIENT)
Dept: FAMILY MEDICINE | Facility: CLINIC | Age: 66
End: 2021-08-02

## 2021-08-09 ENCOUNTER — PATIENT MESSAGE (OUTPATIENT)
Dept: FAMILY MEDICINE | Facility: CLINIC | Age: 66
End: 2021-08-09

## 2021-08-17 ENCOUNTER — PATIENT MESSAGE (OUTPATIENT)
Dept: FAMILY MEDICINE | Facility: CLINIC | Age: 66
End: 2021-08-17

## 2021-08-18 ENCOUNTER — OFFICE VISIT (OUTPATIENT)
Dept: FAMILY MEDICINE | Facility: CLINIC | Age: 66
End: 2021-08-18
Payer: OTHER GOVERNMENT

## 2021-08-18 VITALS
BODY MASS INDEX: 25.57 KG/M2 | SYSTOLIC BLOOD PRESSURE: 128 MMHG | HEIGHT: 69 IN | DIASTOLIC BLOOD PRESSURE: 78 MMHG | OXYGEN SATURATION: 98 % | HEART RATE: 70 BPM | WEIGHT: 172.63 LBS

## 2021-08-18 DIAGNOSIS — E11.29 TYPE 2 DIABETES MELLITUS WITH MICROALBUMINURIA, WITH LONG-TERM CURRENT USE OF INSULIN: ICD-10-CM

## 2021-08-18 DIAGNOSIS — R80.9 TYPE 2 DIABETES MELLITUS WITH MICROALBUMINURIA, WITH LONG-TERM CURRENT USE OF INSULIN: ICD-10-CM

## 2021-08-18 DIAGNOSIS — U07.1 COVID-19 VIRUS INFECTION: Primary | ICD-10-CM

## 2021-08-18 DIAGNOSIS — Z79.4 TYPE 2 DIABETES MELLITUS WITH MICROALBUMINURIA, WITH LONG-TERM CURRENT USE OF INSULIN: ICD-10-CM

## 2021-08-18 DIAGNOSIS — Z79.4 TYPE 2 DIABETES MELLITUS WITH DIABETIC POLYNEUROPATHY, WITH LONG-TERM CURRENT USE OF INSULIN: ICD-10-CM

## 2021-08-18 DIAGNOSIS — Z12.11 SCREENING FOR COLON CANCER: ICD-10-CM

## 2021-08-18 DIAGNOSIS — E11.42 TYPE 2 DIABETES MELLITUS WITH DIABETIC POLYNEUROPATHY, WITH LONG-TERM CURRENT USE OF INSULIN: ICD-10-CM

## 2021-08-18 PROCEDURE — 99213 OFFICE O/P EST LOW 20 MIN: CPT | Mod: PBBFAC,PO | Performed by: INTERNAL MEDICINE

## 2021-08-18 PROCEDURE — 99213 OFFICE O/P EST LOW 20 MIN: CPT | Mod: S$PBB,,, | Performed by: INTERNAL MEDICINE

## 2021-08-18 PROCEDURE — 99999 PR PBB SHADOW E&M-EST. PATIENT-LVL III: CPT | Mod: PBBFAC,,, | Performed by: INTERNAL MEDICINE

## 2021-08-18 PROCEDURE — 99213 PR OFFICE/OUTPT VISIT, EST, LEVL III, 20-29 MIN: ICD-10-PCS | Mod: S$PBB,,, | Performed by: INTERNAL MEDICINE

## 2021-08-18 PROCEDURE — 99999 PR PBB SHADOW E&M-EST. PATIENT-LVL III: ICD-10-PCS | Mod: PBBFAC,,, | Performed by: INTERNAL MEDICINE

## 2021-09-22 ENCOUNTER — LAB VISIT (OUTPATIENT)
Dept: LAB | Facility: HOSPITAL | Age: 66
End: 2021-09-22
Attending: INTERNAL MEDICINE
Payer: OTHER GOVERNMENT

## 2021-09-22 ENCOUNTER — OFFICE VISIT (OUTPATIENT)
Dept: FAMILY MEDICINE | Facility: CLINIC | Age: 66
End: 2021-09-22
Payer: OTHER GOVERNMENT

## 2021-09-22 VITALS
HEART RATE: 73 BPM | SYSTOLIC BLOOD PRESSURE: 126 MMHG | DIASTOLIC BLOOD PRESSURE: 72 MMHG | BODY MASS INDEX: 27.04 KG/M2 | HEIGHT: 69 IN | WEIGHT: 182.56 LBS | OXYGEN SATURATION: 99 %

## 2021-09-22 DIAGNOSIS — Z79.4 TYPE 2 DIABETES MELLITUS WITH MICROALBUMINURIA, WITH LONG-TERM CURRENT USE OF INSULIN: ICD-10-CM

## 2021-09-22 DIAGNOSIS — E11.42 TYPE 2 DIABETES MELLITUS WITH DIABETIC POLYNEUROPATHY, WITH LONG-TERM CURRENT USE OF INSULIN: ICD-10-CM

## 2021-09-22 DIAGNOSIS — R80.9 TYPE 2 DIABETES MELLITUS WITH MICROALBUMINURIA, WITH LONG-TERM CURRENT USE OF INSULIN: ICD-10-CM

## 2021-09-22 DIAGNOSIS — E11.29 TYPE 2 DIABETES MELLITUS WITH MICROALBUMINURIA, WITH LONG-TERM CURRENT USE OF INSULIN: ICD-10-CM

## 2021-09-22 DIAGNOSIS — Z79.4 TYPE 2 DIABETES MELLITUS WITH DIABETIC POLYNEUROPATHY, WITH LONG-TERM CURRENT USE OF INSULIN: ICD-10-CM

## 2021-09-22 DIAGNOSIS — R29.898 WEAKNESS OF LOWER EXTREMITY, UNSPECIFIED LATERALITY: ICD-10-CM

## 2021-09-22 DIAGNOSIS — E11.42 TYPE 2 DIABETES MELLITUS WITH DIABETIC POLYNEUROPATHY, WITH LONG-TERM CURRENT USE OF INSULIN: Primary | ICD-10-CM

## 2021-09-22 DIAGNOSIS — Z79.4 TYPE 2 DIABETES MELLITUS WITH DIABETIC POLYNEUROPATHY, WITH LONG-TERM CURRENT USE OF INSULIN: Primary | ICD-10-CM

## 2021-09-22 LAB
ESTIMATED AVG GLUCOSE: 260 MG/DL (ref 68–131)
HBA1C MFR BLD: 10.7 % (ref 4–5.6)

## 2021-09-22 PROCEDURE — 99213 OFFICE O/P EST LOW 20 MIN: CPT | Mod: S$PBB,,, | Performed by: INTERNAL MEDICINE

## 2021-09-22 PROCEDURE — 99999 PR PBB SHADOW E&M-EST. PATIENT-LVL IV: CPT | Mod: PBBFAC,,, | Performed by: INTERNAL MEDICINE

## 2021-09-22 PROCEDURE — 99213 PR OFFICE/OUTPT VISIT, EST, LEVL III, 20-29 MIN: ICD-10-PCS | Mod: S$PBB,,, | Performed by: INTERNAL MEDICINE

## 2021-09-22 PROCEDURE — 83036 HEMOGLOBIN GLYCOSYLATED A1C: CPT | Performed by: INTERNAL MEDICINE

## 2021-09-22 PROCEDURE — 99214 OFFICE O/P EST MOD 30 MIN: CPT | Mod: PBBFAC,PO | Performed by: INTERNAL MEDICINE

## 2021-09-22 PROCEDURE — 99999 PR PBB SHADOW E&M-EST. PATIENT-LVL IV: ICD-10-PCS | Mod: PBBFAC,,, | Performed by: INTERNAL MEDICINE

## 2021-09-22 PROCEDURE — 36415 COLL VENOUS BLD VENIPUNCTURE: CPT | Mod: PO | Performed by: INTERNAL MEDICINE

## 2021-09-22 RX ORDER — GABAPENTIN 300 MG/1
300 CAPSULE ORAL 3 TIMES DAILY
Qty: 270 CAPSULE | Refills: 3 | Status: SHIPPED | OUTPATIENT
Start: 2021-09-22 | End: 2022-06-22 | Stop reason: SDUPTHER

## 2021-09-22 RX ORDER — ROSUVASTATIN CALCIUM 5 MG/1
5 TABLET, COATED ORAL DAILY
Qty: 90 TABLET | Refills: 3 | Status: SHIPPED | OUTPATIENT
Start: 2021-09-22 | End: 2022-05-27 | Stop reason: SDUPTHER

## 2021-09-22 RX ORDER — LISINOPRIL 20 MG/1
20 TABLET ORAL DAILY
Qty: 90 TABLET | Refills: 3 | Status: SHIPPED | OUTPATIENT
Start: 2021-09-22 | End: 2022-07-06 | Stop reason: SDUPTHER

## 2021-09-23 ENCOUNTER — PATIENT MESSAGE (OUTPATIENT)
Dept: FAMILY MEDICINE | Facility: CLINIC | Age: 66
End: 2021-09-23

## 2021-09-23 ENCOUNTER — LAB VISIT (OUTPATIENT)
Dept: LAB | Facility: HOSPITAL | Age: 66
End: 2021-09-23
Attending: INTERNAL MEDICINE
Payer: OTHER GOVERNMENT

## 2021-09-23 DIAGNOSIS — Z12.11 SCREENING FOR COLON CANCER: ICD-10-CM

## 2021-09-23 PROCEDURE — 82274 ASSAY TEST FOR BLOOD FECAL: CPT | Performed by: INTERNAL MEDICINE

## 2021-09-23 RX ORDER — BLOOD-GLUCOSE TRANSMITTER
EACH MISCELLANEOUS
Qty: 1 EACH | Refills: 1 | Status: SHIPPED | OUTPATIENT
Start: 2021-09-23 | End: 2022-03-15 | Stop reason: SDUPTHER

## 2021-09-23 RX ORDER — BLOOD-GLUCOSE SENSOR
EACH MISCELLANEOUS
Qty: 10 EACH | Refills: 1 | Status: SHIPPED | OUTPATIENT
Start: 2021-09-23 | End: 2022-03-15 | Stop reason: SDUPTHER

## 2021-09-23 RX ORDER — BLOOD-GLUCOSE,RECEIVER,CONT
EACH MISCELLANEOUS
Qty: 1 EACH | Refills: 1 | Status: SHIPPED | OUTPATIENT
Start: 2021-09-23

## 2021-09-28 ENCOUNTER — TELEPHONE (OUTPATIENT)
Dept: FAMILY MEDICINE | Facility: CLINIC | Age: 66
End: 2021-09-28
Payer: OTHER GOVERNMENT

## 2021-09-28 NOTE — TELEPHONE ENCOUNTER
----- Message from Disha Evans sent at 9/28/2021  3:33 PM CDT -----  Regarding: advice  Contact: wifeEster  Type: Needs Medical Advice  Who Called:  wifeEster  Symptoms (please be specific):  rattling in chest, coughing up very dark green almost black mucus  How long has patient had these symptoms:  09/26/21  Pharmacy name and phone #:    [id number] [id number] [place] [address] AT [facility] [address]  [address] [place] [address]  Phone: [phone] Fax: [phone]  Best Call Back Number: [phone]   Additional Information: wife states she would like the doctor to either call in a prescription or advise if he needs to come in. Please call wife to advise. Thanks!

## 2021-09-29 RX ORDER — PEN NEEDLE, DIABETIC 32GX 5/32"
1 NEEDLE, DISPOSABLE MISCELLANEOUS
Qty: 100 EACH | Refills: 0 | Status: SHIPPED | OUTPATIENT
Start: 2021-09-29 | End: 2022-02-17 | Stop reason: SDUPTHER

## 2021-09-30 LAB — HEMOCCULT STL QL IA: NEGATIVE

## 2021-10-11 RX ORDER — INSULIN GLARGINE 100 [IU]/ML
50 INJECTION, SOLUTION SUBCUTANEOUS DAILY
Qty: 45 ML | Refills: 0 | Status: SHIPPED | OUTPATIENT
Start: 2021-10-11 | End: 2022-01-13

## 2021-10-19 ENCOUNTER — TELEPHONE (OUTPATIENT)
Dept: ORTHOPEDICS | Facility: CLINIC | Age: 66
End: 2021-10-19

## 2021-11-13 ENCOUNTER — PATIENT MESSAGE (OUTPATIENT)
Dept: FAMILY MEDICINE | Facility: CLINIC | Age: 66
End: 2021-11-13
Payer: OTHER GOVERNMENT

## 2021-11-17 ENCOUNTER — OFFICE VISIT (OUTPATIENT)
Dept: OPHTHALMOLOGY | Facility: CLINIC | Age: 66
End: 2021-11-17
Payer: OTHER GOVERNMENT

## 2021-11-17 DIAGNOSIS — Z98.890 S/P YAG CAPSULOTOMY, BILATERAL: ICD-10-CM

## 2021-11-17 DIAGNOSIS — Z96.1 PSEUDOPHAKIA: ICD-10-CM

## 2021-11-17 DIAGNOSIS — E11.3293 MILD NONPROLIFERATIVE DIABETIC RETINOPATHY OF BOTH EYES WITHOUT MACULAR EDEMA ASSOCIATED WITH TYPE 2 DIABETES MELLITUS: Primary | ICD-10-CM

## 2021-11-17 DIAGNOSIS — H52.7 REFRACTIVE ERROR: ICD-10-CM

## 2021-11-17 DIAGNOSIS — H53.8 BLURRY VISION: ICD-10-CM

## 2021-11-17 PROCEDURE — 99999 PR PBB SHADOW E&M-EST. PATIENT-LVL III: ICD-10-PCS | Mod: PBBFAC,,, | Performed by: OPHTHALMOLOGY

## 2021-11-17 PROCEDURE — 92004 PR EYE EXAM, NEW PATIENT,COMPREHESV: ICD-10-PCS | Mod: S$PBB,,, | Performed by: OPHTHALMOLOGY

## 2021-11-17 PROCEDURE — 99999 PR PBB SHADOW E&M-EST. PATIENT-LVL III: CPT | Mod: PBBFAC,,, | Performed by: OPHTHALMOLOGY

## 2021-11-17 PROCEDURE — 92004 COMPRE OPH EXAM NEW PT 1/>: CPT | Mod: S$PBB,,, | Performed by: OPHTHALMOLOGY

## 2021-11-17 PROCEDURE — 99213 OFFICE O/P EST LOW 20 MIN: CPT | Mod: PBBFAC,PO | Performed by: OPHTHALMOLOGY

## 2022-01-05 ENCOUNTER — LAB VISIT (OUTPATIENT)
Dept: LAB | Facility: HOSPITAL | Age: 67
End: 2022-01-05
Attending: INTERNAL MEDICINE
Payer: OTHER GOVERNMENT

## 2022-01-05 ENCOUNTER — TELEPHONE (OUTPATIENT)
Dept: ORTHOPEDICS | Facility: CLINIC | Age: 67
End: 2022-01-05
Payer: OTHER GOVERNMENT

## 2022-01-05 ENCOUNTER — TELEPHONE (OUTPATIENT)
Dept: PHYSICAL MEDICINE AND REHAB | Facility: CLINIC | Age: 67
End: 2022-01-05
Payer: OTHER GOVERNMENT

## 2022-01-05 ENCOUNTER — OFFICE VISIT (OUTPATIENT)
Dept: FAMILY MEDICINE | Facility: CLINIC | Age: 67
End: 2022-01-05
Payer: OTHER GOVERNMENT

## 2022-01-05 VITALS
HEART RATE: 71 BPM | DIASTOLIC BLOOD PRESSURE: 78 MMHG | BODY MASS INDEX: 27.53 KG/M2 | OXYGEN SATURATION: 99 % | SYSTOLIC BLOOD PRESSURE: 128 MMHG | HEIGHT: 69 IN | WEIGHT: 185.88 LBS

## 2022-01-05 DIAGNOSIS — Z79.4 TYPE 2 DIABETES MELLITUS WITH RETINOPATHY OF BOTH EYES, WITH LONG-TERM CURRENT USE OF INSULIN, MACULAR EDEMA PRESENCE UNSPECIFIED, UNSPECIFIED RETINOPATHY SEVERITY: ICD-10-CM

## 2022-01-05 DIAGNOSIS — Z79.4 TYPE 2 DIABETES MELLITUS WITH MICROALBUMINURIA, WITH LONG-TERM CURRENT USE OF INSULIN: ICD-10-CM

## 2022-01-05 DIAGNOSIS — R80.9 TYPE 2 DIABETES MELLITUS WITH MICROALBUMINURIA, WITH LONG-TERM CURRENT USE OF INSULIN: ICD-10-CM

## 2022-01-05 DIAGNOSIS — E11.29 TYPE 2 DIABETES MELLITUS WITH MICROALBUMINURIA, WITH LONG-TERM CURRENT USE OF INSULIN: ICD-10-CM

## 2022-01-05 DIAGNOSIS — Z79.4 TYPE 2 DIABETES MELLITUS WITH DIABETIC POLYNEUROPATHY, WITH LONG-TERM CURRENT USE OF INSULIN: ICD-10-CM

## 2022-01-05 DIAGNOSIS — E11.319 TYPE 2 DIABETES MELLITUS WITH RETINOPATHY OF BOTH EYES, WITH LONG-TERM CURRENT USE OF INSULIN, MACULAR EDEMA PRESENCE UNSPECIFIED, UNSPECIFIED RETINOPATHY SEVERITY: ICD-10-CM

## 2022-01-05 DIAGNOSIS — E11.42 TYPE 2 DIABETES MELLITUS WITH DIABETIC POLYNEUROPATHY, WITH LONG-TERM CURRENT USE OF INSULIN: Primary | ICD-10-CM

## 2022-01-05 DIAGNOSIS — E11.42 TYPE 2 DIABETES MELLITUS WITH DIABETIC POLYNEUROPATHY, WITH LONG-TERM CURRENT USE OF INSULIN: ICD-10-CM

## 2022-01-05 DIAGNOSIS — Z79.4 TYPE 2 DIABETES MELLITUS WITH DIABETIC POLYNEUROPATHY, WITH LONG-TERM CURRENT USE OF INSULIN: Primary | ICD-10-CM

## 2022-01-05 DIAGNOSIS — R29.898 WEAKNESS OF LOWER EXTREMITY, UNSPECIFIED LATERALITY: ICD-10-CM

## 2022-01-05 DIAGNOSIS — I10 ESSENTIAL HYPERTENSION: ICD-10-CM

## 2022-01-05 LAB
ALBUMIN SERPL BCP-MCNC: 3.3 G/DL (ref 3.5–5.2)
ALP SERPL-CCNC: 66 U/L (ref 55–135)
ALT SERPL W/O P-5'-P-CCNC: 39 U/L (ref 10–44)
ANION GAP SERPL CALC-SCNC: 9 MMOL/L (ref 8–16)
AST SERPL-CCNC: 37 U/L (ref 10–40)
BILIRUB SERPL-MCNC: 0.4 MG/DL (ref 0.1–1)
BUN SERPL-MCNC: 17 MG/DL (ref 8–23)
CALCIUM SERPL-MCNC: 9.4 MG/DL (ref 8.7–10.5)
CHLORIDE SERPL-SCNC: 104 MMOL/L (ref 95–110)
CHOLEST SERPL-MCNC: 131 MG/DL (ref 120–199)
CHOLEST/HDLC SERPL: 2.5 {RATIO} (ref 2–5)
CK SERPL-CCNC: 474 U/L (ref 20–200)
CO2 SERPL-SCNC: 23 MMOL/L (ref 23–29)
CREAT SERPL-MCNC: 0.6 MG/DL (ref 0.5–1.4)
EST. GFR  (AFRICAN AMERICAN): >60 ML/MIN/1.73 M^2
EST. GFR  (NON AFRICAN AMERICAN): >60 ML/MIN/1.73 M^2
ESTIMATED AVG GLUCOSE: 243 MG/DL (ref 68–131)
GLUCOSE SERPL-MCNC: 153 MG/DL (ref 70–110)
HBA1C MFR BLD: 10.1 % (ref 4–5.6)
HDLC SERPL-MCNC: 52 MG/DL (ref 40–75)
HDLC SERPL: 39.7 % (ref 20–50)
LDLC SERPL CALC-MCNC: 65.2 MG/DL (ref 63–159)
NONHDLC SERPL-MCNC: 79 MG/DL
POTASSIUM SERPL-SCNC: 4.4 MMOL/L (ref 3.5–5.1)
PROT SERPL-MCNC: 5.9 G/DL (ref 6–8.4)
SODIUM SERPL-SCNC: 136 MMOL/L (ref 136–145)
TRIGL SERPL-MCNC: 69 MG/DL (ref 30–150)
TSH SERPL DL<=0.005 MIU/L-ACNC: 1.49 UIU/ML (ref 0.4–4)

## 2022-01-05 PROCEDURE — 99214 OFFICE O/P EST MOD 30 MIN: CPT | Mod: S$PBB,,, | Performed by: INTERNAL MEDICINE

## 2022-01-05 PROCEDURE — 80053 COMPREHEN METABOLIC PANEL: CPT | Performed by: INTERNAL MEDICINE

## 2022-01-05 PROCEDURE — 99214 PR OFFICE/OUTPT VISIT, EST, LEVL IV, 30-39 MIN: ICD-10-PCS | Mod: S$PBB,,, | Performed by: INTERNAL MEDICINE

## 2022-01-05 PROCEDURE — 85027 COMPLETE CBC AUTOMATED: CPT | Performed by: INTERNAL MEDICINE

## 2022-01-05 PROCEDURE — 99999 PR PBB SHADOW E&M-EST. PATIENT-LVL IV: CPT | Mod: PBBFAC,,, | Performed by: INTERNAL MEDICINE

## 2022-01-05 PROCEDURE — 83036 HEMOGLOBIN GLYCOSYLATED A1C: CPT | Performed by: INTERNAL MEDICINE

## 2022-01-05 PROCEDURE — 99214 OFFICE O/P EST MOD 30 MIN: CPT | Mod: PBBFAC,PO | Performed by: INTERNAL MEDICINE

## 2022-01-05 PROCEDURE — 99999 PR PBB SHADOW E&M-EST. PATIENT-LVL IV: ICD-10-PCS | Mod: PBBFAC,,, | Performed by: INTERNAL MEDICINE

## 2022-01-05 PROCEDURE — 82550 ASSAY OF CK (CPK): CPT | Performed by: INTERNAL MEDICINE

## 2022-01-05 PROCEDURE — 84443 ASSAY THYROID STIM HORMONE: CPT | Performed by: INTERNAL MEDICINE

## 2022-01-05 PROCEDURE — 36415 COLL VENOUS BLD VENIPUNCTURE: CPT | Mod: PO | Performed by: INTERNAL MEDICINE

## 2022-01-05 PROCEDURE — 80061 LIPID PANEL: CPT | Performed by: INTERNAL MEDICINE

## 2022-01-05 RX ORDER — LANCETS 28 GAUGE
EACH MISCELLANEOUS
Qty: 100 EACH | Refills: 2 | Status: SHIPPED | OUTPATIENT
Start: 2022-01-05 | End: 2022-06-12 | Stop reason: SDUPTHER

## 2022-01-05 NOTE — TELEPHONE ENCOUNTER
No new care gaps identified.  Powered by [employer]. Reference number: [id number].   1/05/2022 11:43:37 AM CST

## 2022-01-05 NOTE — TELEPHONE ENCOUNTER
Called and left message for patient to return call to schedule an appointment this week with Dr. Burkett per referral from Dr. Gallardo. Left number for him to call back to schedule.

## 2022-01-05 NOTE — PROGRESS NOTES
Subjective     Mikey Ramirez is a 66 y.o. 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    It seemed he was brushed off when he called to get set up the appointment for his EMG/NCS that I referred him PMR for. He has not rescheduled that appointment and was told to see back and spine. This is the first I am hearing of this. Muscular strength has not improved.    Hemoglobin A1C (%)   Date Value   09/22/2021 10.7 (H)   06/10/2021 >14.0 (H)   02/26/2018 11.3 (H)     Wt Readings from Last 3 Encounters:   01/05/22 0846 84.3 kg (185 lb 13.6 oz)   09/22/21 1603 82.8 kg (182 lb 8.7 oz)   08/18/21 1607 78.3 kg (172 lb 9.9 oz)     Seeing FBG's in the 90's-120's. Big improvement since diagnosis.  Diet is carb controlled and small portions.  He has been on Lantus 52 units nightly.  Relatively new onset constipation. Hard stools. He has decreased appetite.  He has neuropathy that is moving up his legs. Skin is sensitive to touch on the lower extremities. There is some numbness in both legs as well.  He has been taking some aleve regularly at night but has some sarna cream he could try instead.  Retinopathy was found recently at his optho appt.    ROS  Medications     Outpatient Medications Marked as Taking for the 1/5/22 encounter (Office Visit) with Branden Gallardo MD   Medication Sig Dispense Refill    aspirin (ECOTRIN) 81 MG EC tablet Take 81 mg by mouth once daily.      blood sugar diagnostic (FREESTYLE LITE STRIPS) Strp USE TO CHECK BLOOD GLUCOSE ONCE DAILY 200 strip 2    blood-glucose meter kit To check BG 1 times daily, to use with insurance preferred meter. ICD10: E11. 1 each 1    blood-glucose meter,continuous (DEXCOM G6 ) Misc Use as directed 1 each 1    blood-glucose sensor (DEXCOM G6 SENSOR) Vanessa Use as directed 10 each 1    blood-glucose transmitter (DEXCOM G6 TRANSMITTER) Vanessa Use as directed 1 each 1     "gabapentin (NEURONTIN) 300 MG capsule Take 1 capsule (300 mg total) by mouth 3 (three) times daily. 270 capsule 3    insulin (LANTUS SOLOSTAR U-100 INSULIN) glargine 100 units/mL (3mL) SubQ pen Inject 50 Units into the skin once daily. 45 mL 0    lancets (FREESTYLE LANCETS) 28 gauge Misc TO CHECK BLOOD GLUCOSE ONCE DAILY 100 each 2    lisinopriL (PRINIVIL,ZESTRIL) 20 MG tablet Take 1 tablet (20 mg total) by mouth once daily. 90 tablet 3    LUTEIN ORAL Take by mouth.      pen needle, diabetic 32 gauge x 5/16" Ndle 1 each by Misc.(Non-Drug; Combo Route) route as needed. 100 each 0    rosuvastatin (CRESTOR) 5 MG tablet Take 1 tablet (5 mg total) by mouth once daily. 90 tablet 3     Objective     /78   Pulse 71   Ht 5' 9" (1.753 m)   Wt 84.3 kg (185 lb 13.6 oz)   SpO2 99%   BMI 27.44 kg/m²   Physical Exam  Constitutional:       General: He is not in acute distress.     Appearance: He is well-developed.   Neurological:      Mental Status: He is alert.   Psychiatric:         Mood and Affect: Mood and affect normal.       Assessment and Plan     Type 2 diabetes mellitus with diabetic polyneuropathy, with long-term current use of insulin  -     Hemoglobin A1C; Future; Expected date: 01/05/2022  -     CBC Without Differential; Future; Expected date: 01/05/2022  -     Comprehensive Metabolic Panel; Future; Expected date: 01/05/2022  -     Lipid Panel; Future; Expected date: 01/05/2022  -     TSH; Future; Expected date: 01/05/2022    Weakness of lower extremity, unspecified laterality  -     CK; Future; Expected date: 01/05/2022    Type 2 diabetes mellitus with microalbuminuria, with long-term current use of insulin    Essential hypertension    Type 2 diabetes mellitus with retinopathy of both eyes, with long-term current use of insulin, macular edema presence unspecified, unspecified retinopathy severity      I think he is doing well with baseline/lantus insulin and getting better control of his " diabetes.  Very disappointed he has not had an EMG/NCS study done yet and have contacted this department to get him an appointment. He will need imaging done if this is inconclusive.    Continue lantus dose. Continue aspirin, lisinopril and rosuvastatin as above.  He was not able to tolerate metformin previously. If his A1C remains elevated I would prefer a GLP1 agonist or SGLT2i over meal time insulin to help prevent post-prandial hyperglycemia.  ___________________  Branden Gallardo MD  Internal Medicine and Pediatrics

## 2022-01-05 NOTE — TELEPHONE ENCOUNTER
Called patient and left VM to set up appointment for EMG.  ----- Message from Branden Gallardo MD sent at 1/5/2022 12:49 PM CST -----  I will order just the EMG/NCS if you can get that scheduled, thanks  ----- Message -----  From: Geovany Alford  Sent: 1/5/2022  11:45 AM CST  To: Branden Gallardo MD    Does he need to do an EMG with Madelaine or clinic visit? Did not see a referral for EMG for patient.  Thanks,  Geovany ROOT  ----- Message -----  From: Branden Gallardo MD  Sent: 1/5/2022  11:33 AM CST  To: Marifer Atkinson LPN, Madelaine MELARA Staff    Please reschedule patient asap. He will need an appt with Dr. Burkett for evaluation, EMG/NCS.

## 2022-01-06 ENCOUNTER — PATIENT MESSAGE (OUTPATIENT)
Dept: FAMILY MEDICINE | Facility: CLINIC | Age: 67
End: 2022-01-06
Payer: OTHER GOVERNMENT

## 2022-01-06 LAB
ERYTHROCYTE [DISTWIDTH] IN BLOOD BY AUTOMATED COUNT: 13.1 % (ref 11.5–14.5)
HCT VFR BLD AUTO: 36.7 % (ref 40–54)
HGB BLD-MCNC: 12.3 G/DL (ref 14–18)
MCH RBC QN AUTO: 29.1 PG (ref 27–31)
MCHC RBC AUTO-ENTMCNC: 33.5 G/DL (ref 32–36)
MCV RBC AUTO: 87 FL (ref 82–98)
PLATELET # BLD AUTO: 345 K/UL (ref 150–450)
PMV BLD AUTO: 10.8 FL (ref 9.2–12.9)
RBC # BLD AUTO: 4.23 M/UL (ref 4.6–6.2)
WBC # BLD AUTO: 5.15 K/UL (ref 3.9–12.7)

## 2022-01-10 NOTE — TELEPHONE ENCOUNTER
No new care gaps identified.  Powered by [employer]. Reference number: [id number].   1/10/2022 1:10:32 AM CST

## 2022-01-13 ENCOUNTER — DOCUMENTATION ONLY (OUTPATIENT)
Dept: FAMILY MEDICINE | Facility: CLINIC | Age: 67
End: 2022-01-13
Payer: OTHER GOVERNMENT

## 2022-01-13 RX ORDER — INSULIN GLARGINE 100 [IU]/ML
INJECTION, SOLUTION SUBCUTANEOUS
Qty: 45 ML | Refills: 1 | Status: SHIPPED | OUTPATIENT
Start: 2022-01-13 | End: 2022-04-05 | Stop reason: SDUPTHER

## 2022-01-13 NOTE — TELEPHONE ENCOUNTER
Refill Authorization Note   Mikey Ramirez  is requesting a refill authorization.  Brief Assessment and Rationale for Refill:  Approve     Medication Therapy Plan:       Medication Reconciliation Completed: No   Comments:   --->Care Gap information included below if applicable.   Orders Placed This Encounter    LANTUS SOLOSTAR U-100 INSULIN glargine 100 units/mL (3mL) SubQ pen      Requested Prescriptions   Signed Prescriptions Disp Refills    LANTUS SOLOSTAR U-100 INSULIN glargine 100 units/mL (3mL) SubQ pen 45 mL 1     Sig: INJECT 50 UNITS UNDER THE SKIN ONCE DAILY       Endocrinology:  Diabetes - Insulins Passed - 1/10/2022  1:10 AM        Passed - Patient is at least 18 years old        Passed - Valid encounter within last 15 months     Recent Visits  Date Type Provider Dept   01/05/22 Office Visit Branden Gallardo MD [facility]   Showing recent visits within past 720 days and meeting all other requirements  Future Appointments  No visits were found meeting these conditions.  Showing future appointments within next 150 days and meeting all other requirements      Future Appointments              In 1 week Tucker Burkett MD [place] [facility] [place]    [facility] 2 months [facility] [place] [facility]                Passed - HBA1C within 180 days     Lab Results   Component Value Date    HGBA1C 10.1 (H) 01/05/2022    HGBA1C 10.7 (H) 09/22/2021    HGBA1C >14.0 (H) 06/10/2021              Passed - eGFR is 30 or above and within 360 days     Lab Results   Component Value Date    EGFRNONAA >60.0 01/05/2022    EGFRNONAA >60.0 06/10/2021    EGFRNONAA >60.0 02/26/2018                Passed - Cr is 1.39 or below and within 360 days     Lab Results   Component Value Date    CREATININE 0.6 01/05/2022    CREATININE 0.7 06/10/2021    CREATININE 0.7 02/26/2018                  Appointments  past 12m or future 3m with PCP    Date Provider   Last Visit   1/5/2022  Branden Gallardo MD   Next Visit   4/5/2022 Brnaden Gallardo MD   ED visits in past 90 days: 0     Note composed:2:13 PM 01/13/2022

## 2022-01-14 NOTE — PROGRESS NOTES
DENIED  Must have an inadequate response with Bydureon / Bydureon BCise or is unable to take Bydureon / Bydureon BCise due to impaired renal function.

## 2022-01-20 ENCOUNTER — PATIENT OUTREACH (OUTPATIENT)
Dept: ADMINISTRATIVE | Facility: OTHER | Age: 67
End: 2022-01-20
Payer: OTHER GOVERNMENT

## 2022-01-20 NOTE — PROGRESS NOTES
Health Maintenance Due   Topic Date Due    TETANUS VACCINE  Never done    Shingles Vaccine (1 of 2) Never done    Foot Exam  05/19/2017    COVID-19 Vaccine (3 - Booster for Pfizer series) 11/13/2021     Updates were requested from care everywhere.  Chart was reviewed for overdue Proactive [facility] Encounters ([facility]) topics (CRS, Breast Cancer Screening, Eye exam)  Health Maintenance has been updated.  LINKS immunization registry triggered.  Immunizations were reconciled.

## 2022-01-24 ENCOUNTER — OFFICE VISIT (OUTPATIENT)
Dept: PHYSICAL MEDICINE AND REHAB | Facility: CLINIC | Age: 67
End: 2022-01-24
Payer: OTHER GOVERNMENT

## 2022-01-24 VITALS — HEIGHT: 71 IN | BODY MASS INDEX: 26.02 KG/M2 | WEIGHT: 185.88 LBS

## 2022-01-24 DIAGNOSIS — Z79.4 TYPE 2 DIABETES MELLITUS WITH DIABETIC POLYNEUROPATHY, WITH LONG-TERM CURRENT USE OF INSULIN: ICD-10-CM

## 2022-01-24 DIAGNOSIS — R29.898 WEAKNESS OF LOWER EXTREMITY, UNSPECIFIED LATERALITY: ICD-10-CM

## 2022-01-24 DIAGNOSIS — E11.42 TYPE 2 DIABETES MELLITUS WITH DIABETIC POLYNEUROPATHY, WITH LONG-TERM CURRENT USE OF INSULIN: ICD-10-CM

## 2022-01-24 DIAGNOSIS — G60.8 PERIPHERAL SENSORY-MOTOR AXONAL POLYNEUROPATHY: Primary | ICD-10-CM

## 2022-01-24 PROCEDURE — 95909 NRV CNDJ TST 5-6 STUDIES: CPT | Mod: PBBFAC,PN | Performed by: PHYSICAL MEDICINE & REHABILITATION

## 2022-01-24 PROCEDURE — 99213 OFFICE O/P EST LOW 20 MIN: CPT | Mod: PBBFAC,PN,25 | Performed by: PHYSICAL MEDICINE & REHABILITATION

## 2022-01-24 PROCEDURE — 99999 PR PBB SHADOW E&M-EST. PATIENT-LVL III: ICD-10-PCS | Mod: PBBFAC,,, | Performed by: PHYSICAL MEDICINE & REHABILITATION

## 2022-01-24 PROCEDURE — 95909 PR NERVE CONDUCTION STUDY; 5-6 STUDIES: ICD-10-PCS | Mod: 26,S$PBB,, | Performed by: PHYSICAL MEDICINE & REHABILITATION

## 2022-01-24 PROCEDURE — 95886 MUSC TEST DONE W/N TEST COMP: CPT | Mod: 26,S$PBB,, | Performed by: PHYSICAL MEDICINE & REHABILITATION

## 2022-01-24 PROCEDURE — 95886 PR EMG COMPLETE, W/ NERVE CONDUCTION STUDIES, 5+ MUSCLES: ICD-10-PCS | Mod: 26,S$PBB,, | Performed by: PHYSICAL MEDICINE & REHABILITATION

## 2022-01-24 PROCEDURE — 99999 PR PBB SHADOW E&M-EST. PATIENT-LVL III: CPT | Mod: PBBFAC,,, | Performed by: PHYSICAL MEDICINE & REHABILITATION

## 2022-01-24 PROCEDURE — 95886 MUSC TEST DONE W/N TEST COMP: CPT | Mod: PBBFAC,PN | Performed by: PHYSICAL MEDICINE & REHABILITATION

## 2022-01-24 PROCEDURE — 99499 NO LOS: ICD-10-PCS | Mod: S$PBB,,, | Performed by: PHYSICAL MEDICINE & REHABILITATION

## 2022-01-24 PROCEDURE — 99499 UNLISTED E&M SERVICE: CPT | Mod: S$PBB,,, | Performed by: PHYSICAL MEDICINE & REHABILITATION

## 2022-01-24 PROCEDURE — 95909 NRV CNDJ TST 5-6 STUDIES: CPT | Mod: 26,S$PBB,, | Performed by: PHYSICAL MEDICINE & REHABILITATION

## 2022-01-25 NOTE — PROGRESS NOTES
[facility]  [address] [facility]  [place] [address]             Full Name: James Jensen Gender: Male  Patient ID: [id number]  History: C/o weakness in both legs. Onset about a year. Worse recently. Better with rest, worse with movement. Pain feels like achy.      Visit Date: 1/24/2022 3:58 PM  Examining Physician: Tucker Burkett MD  Referring Physician: Dr. Sami MD      Sensory NCS      Nerve / Sites Rec. Site Onset Lat Peak Lat NP Amp PP Amp Segments Distance Velocity     ms ms µV µV  cm m/s   R Radial - Anatomical snuff box (Forearm)      Forearm Wrist 2.29 2.77 8.2 3.5 Forearm - Wrist 10 44   R Sural - Ankle (Calf)      Calf Ankle NR NR NR NR Calf - Ankle 14 NR   L Sural - Ankle (Calf)      Calf Ankle NR NR NR NR Calf - Ankle 14 NR       Motor NCS      Nerve / Sites Muscle Latency Amplitude Amp % Duration Segments Distance Lat Diff Velocity     ms mV % ms  cm ms m/s   R Peroneal - EDB      Ankle EDB NR NR NR NR Ankle - EDB 8        Fib head EDB NR NR NR NR Fib head - Ankle  NR    L Peroneal - EDB      Ankle EDB NR NR NR NR Ankle - EDB 8        Fib head EDB NR NR NR NR Fib head - Ankle  NR    R Tibial - AH      Ankle AH 8.58 0.7 100 13.73 Ankle - AH 8        Pop fossa AH 28.13 0.3 46.3 13.23 Pop fossa - Ankle 41 19.54 21       EMG Summary Table     Spontaneous MUAP Recruitment   Muscle IA Fib PSW Fasc CRD Amp Dur. PPP Pattern   L. Vastus medialis 1+ None 1+ None None N 1- 2+ N   L. Tibialis anterior 2+ None 2+ None None 1+ N N Reduced   L. Peroneus longus 1+ 1+ 1+ None None N N N Reduced   L. Gastrocnemius (Medial head) 2+ 1+ 2+ None None N 1+ N Reduced   L. Gluteus medius 1+ 1+ None None None N 1+ 1+ N   L. Lumbar paraspinals 1+ None 1+ None None       L. Gluteus lena 1+ 1+ 1+ None None N N N N   R. Vastus medialis 2+ 1+ 2+ None None N N 2+ Reduced   R. Tibialis anterior 1+ None 1+ None None N N N Reduced   R. Peroneus longus 1+ None 1+ None None N N N Reduced   R. Gastrocnemius (Medial head) 2+  None 2+ None None N N N Reduced   R. Gluteus medius 1+ None 1+ None None N 1+ N Reduced   R. Gluteus lena 1+ None 1+ None None       R. Lumbar paraspinals 1+ None 1+ None None       R. Thoracic paraspinals (mid) CRD None None None None           Summary    The motor conduction test had results outside of the specified normal range in all 3 of the tested nerves:   In the R Peroneal - EDB study  o the response was considered absent for Ankle stimulation  o the response was considered absent for Fib head stimulation   In the L Peroneal - EDB study  o the response was considered absent for Ankle stimulation  o the response was considered absent for Fib head stimulation   In the R Tibial - AH study  o the take off latency result was increased for Ankle stimulation  o the peak amplitude result was reduced for Ankle stimulation  o the take off velocity result was reduced for Pop fossa - Ankle segment    The sensory conduction test had results outside of the specified normal range in all 3 of the tested nerves:   In the R Radial - Anatomical snuff box (Forearm) study  o the peak amplitude result was reduced for Forearm stimulation   In the R Sural - Ankle (Calf) study  o the response was considered absent for Calf stimulation   In the L Sural - Ankle (Calf) study  o the response was considered absent for Calf stimulation    The needle EMG study was abnormal in all 15 tested muscles.   Abnormal spontaneous/insertional activity was found in L. Vastus medialis, L. Tibialis anterior, L. Peroneus longus, L. Gastrocnemius (Medial head), L. Gluteus medius, L. Lumbar paraspinals, L. Gluteus lena, R. Vastus medialis, R. Tibialis anterior, R. Peroneus longus, R. Gastrocnemius (Medial head), R. Gluteus medius, R. Gluteus lena, R. Lumbar paraspinals, R. Thoracic paraspinals (mid).   The MUP waveform abnormality was found in L. Vastus medialis, L. Tibialis anterior, L. Gastrocnemius (Medial head), L. Gluteus medius, R.  Vastus medialis, R. Gluteus medius.   Abnormal interference pattern was found in L. Tibialis anterior, L. Peroneus longus, L. Gastrocnemius (Medial head), R. Vastus medialis, R. Tibialis anterior, R. Peroneus longus, R. Gastrocnemius (Medial head), R. Gluteus medius.      Electrodiagnostic Impression:  1. 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  2. Needle EMG sampling of the bilateral lower extremities revealed abnormal findings in all muscles sampled.  Abnormal spontaneous activity was observed in all muscles.  Considering the diffuse nature of abnormalities, a diagnosis for a definitive neuro pathologic process cannot be made at this time.  Potential etiologies include motor neuron disease, bilateral lumbosacral plexopathy (including diabetic amyotrophy), and/or a combination of multilevel lumbosacral radiculopathy and severe peripheral polyneuropathy.  While motor unit analysis revealed findings more suggestive of a neuropathic type process, a myopathic process is also possibility, although lower on the differential. Correlation required to dictate further testing.    Plan:  Today's electrodiagnostic testing did confirm the presence of peripheral polyneuropathy, which is severe.  However, today's abnormal findings cannot be entirely explained by that diagnosis in isolation.  As stated above, further testing is warranted to come to a diagnostic conclusion.  This should likely include EMG of the upper extremities.  Additionally, MRI of the lumbar spine as well as perhaps serologic studies for potential myopathies could be considered pending the results of the upper extremity EMG. Todays test results will be sent to his referring physician, Dr. Gallardo for further review and direction in treatment.    Thank you very much for the referral. Please call if you have any questions regarding this study or the report.        ___________________________  Tucker Burkett M.D.

## 2022-01-26 ENCOUNTER — TELEPHONE (OUTPATIENT)
Dept: PHYSICAL MEDICINE AND REHAB | Facility: CLINIC | Age: 67
End: 2022-01-26
Payer: OTHER GOVERNMENT

## 2022-01-26 DIAGNOSIS — G62.9 NEUROPATHY: Primary | ICD-10-CM

## 2022-01-26 NOTE — TELEPHONE ENCOUNTER
Called and left VM to schedule upper EMG  ----- Message from Tucker Burkett MD sent at 1/26/2022  9:39 AM CST -----  Yes, we will schedule him and let you know what that testing shows.    Prashanth, please get Mr. Ramirez back for EMG of the uppers.  ----- Message -----  From: Branden Gallardo MD  Sent: 1/25/2022   6:53 PM CST  To: Tucker Burkett MD    Thanks, yes, could you please schedule that?  Branden  ----- Message -----  From: Tucker Burkett MD  Sent: 1/25/2022   6:33 PM CST  To: MD Marylou Rico,    I did the EMG on this patient that you sent yesterday.  You were concerned for diabetic amyotrophy, which may be the case, however I do have some concern that he may have more systemic process going on, specifically we need to rule out motor neuron disease. I recommended that he return for EMG of the upper extremities. Let me know if you are ok with this and we can work to schedule.     Xavier

## 2022-02-17 DIAGNOSIS — Z79.4 TYPE 2 DIABETES MELLITUS WITH DIABETIC POLYNEUROPATHY, WITH LONG-TERM CURRENT USE OF INSULIN: Primary | ICD-10-CM

## 2022-02-17 DIAGNOSIS — E11.42 TYPE 2 DIABETES MELLITUS WITH DIABETIC POLYNEUROPATHY, WITH LONG-TERM CURRENT USE OF INSULIN: Primary | ICD-10-CM

## 2022-02-17 RX ORDER — PEN NEEDLE, DIABETIC 32GX 5/32"
1 NEEDLE, DISPOSABLE MISCELLANEOUS
Qty: 100 EACH | Refills: 0 | Status: SHIPPED | OUTPATIENT
Start: 2022-02-17 | End: 2022-06-12 | Stop reason: SDUPTHER

## 2022-02-17 NOTE — TELEPHONE ENCOUNTER
No new care gaps identified.  Powered by [employer]. Reference number: [id number].   2/17/2022 4:13:10 PM CST

## 2022-03-07 ENCOUNTER — PATIENT OUTREACH (OUTPATIENT)
Dept: ADMINISTRATIVE | Facility: OTHER | Age: 67
End: 2022-03-07
Payer: OTHER GOVERNMENT

## 2022-03-07 NOTE — PROGRESS NOTES
Health Maintenance Due   Topic Date Due    TETANUS VACCINE  Never done    Shingles Vaccine (1 of 2) Never done    Foot Exam  05/19/2017    COVID-19 Vaccine (3 - Booster for Pfizer series) 10/13/2021     Updates were requested from care everywhere.  Chart was reviewed for overdue [facility]active [facility] Encounters ([facility]) topics (CRS, Breast Cancer Screening, Eye exam)  Health Maintenance has been updated.  LINKS immunization registry triggered.  Immunizations were reconciled.

## 2022-03-08 ENCOUNTER — OFFICE VISIT (OUTPATIENT)
Dept: PHYSICAL MEDICINE AND REHAB | Facility: CLINIC | Age: 67
End: 2022-03-08
Payer: OTHER GOVERNMENT

## 2022-03-08 ENCOUNTER — TELEPHONE (OUTPATIENT)
Dept: PHYSICAL MEDICINE AND REHAB | Facility: CLINIC | Age: 67
End: 2022-03-08

## 2022-03-08 VITALS — BODY MASS INDEX: 26.74 KG/M2 | WEIGHT: 186.75 LBS | HEIGHT: 70 IN

## 2022-03-08 DIAGNOSIS — G98.8: Primary | ICD-10-CM

## 2022-03-08 DIAGNOSIS — G62.9 PERIPHERAL POLYNEUROPATHY: ICD-10-CM

## 2022-03-08 PROCEDURE — 95886 MUSC TEST DONE W/N TEST COMP: CPT | Mod: PBBFAC,PN | Performed by: PHYSICAL MEDICINE & REHABILITATION

## 2022-03-08 PROCEDURE — 99213 OFFICE O/P EST LOW 20 MIN: CPT | Mod: PBBFAC,PN,25 | Performed by: PHYSICAL MEDICINE & REHABILITATION

## 2022-03-08 PROCEDURE — 99999 PR PBB SHADOW E&M-EST. PATIENT-LVL III: ICD-10-PCS | Mod: PBBFAC,,, | Performed by: PHYSICAL MEDICINE & REHABILITATION

## 2022-03-08 PROCEDURE — 99999 PR PBB SHADOW E&M-EST. PATIENT-LVL III: CPT | Mod: PBBFAC,,, | Performed by: PHYSICAL MEDICINE & REHABILITATION

## 2022-03-08 PROCEDURE — 99499 UNLISTED E&M SERVICE: CPT | Mod: S$PBB,,, | Performed by: PHYSICAL MEDICINE & REHABILITATION

## 2022-03-08 PROCEDURE — 99499 NO LOS: ICD-10-PCS | Mod: S$PBB,,, | Performed by: PHYSICAL MEDICINE & REHABILITATION

## 2022-03-08 PROCEDURE — 95910 PR NERVE CONDUCTION STUDY; 7-8 STUDIES: ICD-10-PCS | Mod: 26,S$PBB,, | Performed by: PHYSICAL MEDICINE & REHABILITATION

## 2022-03-08 PROCEDURE — 95910 NRV CNDJ TEST 7-8 STUDIES: CPT | Mod: PBBFAC,PN | Performed by: PHYSICAL MEDICINE & REHABILITATION

## 2022-03-08 PROCEDURE — 95910 NRV CNDJ TEST 7-8 STUDIES: CPT | Mod: 26,S$PBB,, | Performed by: PHYSICAL MEDICINE & REHABILITATION

## 2022-03-08 PROCEDURE — 95886 MUSC TEST DONE W/N TEST COMP: CPT | Mod: 26,S$PBB,, | Performed by: PHYSICAL MEDICINE & REHABILITATION

## 2022-03-08 PROCEDURE — 95886 PR EMG COMPLETE, W/ NERVE CONDUCTION STUDIES, 5+ MUSCLES: ICD-10-PCS | Mod: 26,S$PBB,, | Performed by: PHYSICAL MEDICINE & REHABILITATION

## 2022-03-08 NOTE — TELEPHONE ENCOUNTER
Provider has placed orders for pt to be seen. Dr. Burkett is requesting sooner than first available appointment due to possible ALS DX. please contact pt to advise.

## 2022-03-08 NOTE — PROGRESS NOTES
[facility]  [address] [place] [address]             Full Name: James Jensen Gender: Male  Patient ID: [id number]  History: No strength in both legs. Onset about a year. Worse recently. Better with rest, worse with movement. Pain feels like achy. Reports decline in handwriting/penmanship recently.      Visit Date: 3/8/2022 7:54 AM  Examining Physician: Tucker Burkett MD  Referring Physician: MD Madelaine      Sensory NCS      Nerve / Sites Rec. Site Onset Lat Peak Lat NP Amp PP Amp Segments Distance Velocity     ms ms µV µV  cm m/s   L Median - Digit III (Antidromic)      Wrist Dig III NR NR NR NR Wrist - Dig III 14 NR   R Median - Digit III (Antidromic)      Wrist Dig III NR NR NR NR Wrist - Dig III 14 NR   L Ulnar - Digit V (Antidromic)      Wrist Dig V NR NR NR NR Wrist - Dig V 14 NR   R Ulnar - Digit V (Antidromic)      Wrist Dig V NR NR NR NR Wrist - Dig V 14 NR       Motor NCS      Nerve / Sites Muscle Latency Amplitude Amp % Duration Segments Distance Lat Diff Velocity     ms mV % ms  cm ms m/s   R Median - APB      Wrist APB 5.25 4.1 100 8.40 Wrist - APB 8        Elbow APB 12.58 3.6 88.3  Elbow - Wrist 26 7.33 35   L Median - APB      Wrist APB 5.44 3.6 100 8.88 Wrist - APB 8        Elbow APB 12.73 3.0 81.6 10.33 Elbow - Wrist 28 7.29 38   R Ulnar - ADM      Wrist ADM 4.44 4.7 100 7.44 Wrist - ADM 8        B.Elbow ADM 10.21 4.5 95 8.54 B.Elbow - Wrist 21 5.77 36      A.Elbow ADM 12.46 3.6 74.9  A.Elbow - B.Elbow 10 2.25 44   L Ulnar - ADM      Wrist ADM 4.04 2.8 100 9.19 Wrist - ADM 8        B.Elbow ADM 10.04 1.7 63.1 9.52 B.Elbow - Wrist 22 6.00 37      A.Elbow ADM 13.21 1.9 68.4  A.Elbow - B.Elbow 10 3.17 32       EMG Summary Table     Spontaneous MUAP Recruitment   Muscle IA Fib PSW Fasc CRD Amp Dur. PPP Pattern   L. Deltoid 1+ None 1+ None None N 1+ N N   L. Biceps brachii 1+ None 1+ None None N N 1+ N   L. Triceps brachii 1+ 1+ None None None N N 1+ N   L. Pronator teres 1+ None 1+  None None N N 1+ N   L. First dorsal interosseous 2+ 1+ 2+ None None N 1+ N N   L. Cervical paraspinals CRD None None None None       L. Thoracic paraspinals 1+ None 1+ None None       R. Deltoid N None None None None N N N Reduced   R. Biceps brachii N None None None None N N N Reduced   R. Triceps brachii N None None None None N N 1+ N   R. Pronator teres CRD None None None None N 1+ N N   R. First dorsal interosseous 2+ None 2+ None None N 1+ N N       Summary    The motor conduction test had results outside of the specified normal range in all 4 of the tested nerves:   In the R Median - APB study  o the take off latency result was increased for Wrist stimulation  o the take off velocity result was reduced for Elbow - Wrist segment   In the L Median - APB study  o the take off latency result was increased for Wrist stimulation  o the peak amplitude result was reduced for Wrist stimulation  o the take off velocity result was reduced for Elbow - Wrist segment   In the R Ulnar - ADM study  o the take off latency result was increased for Wrist stimulation  o the peak amplitude result was reduced for Wrist stimulation  o the take off velocity result was reduced for B.Elbow - Wrist segment  o the take off velocity result was reduced for A.Elbow - B.Elbow segment   In the L Ulnar - ADM study  o the take off latency result was increased for Wrist stimulation  o the peak amplitude result was reduced for Wrist stimulation  o the take off velocity result was reduced for B.Elbow - Wrist segment  o the take off velocity result was reduced for A.Elbow - B.Elbow segment    The sensory conduction test had results outside of the specified normal range in all 4 of the tested nerves:   In the L Median - Digit III (Antidromic) study  o the response was considered absent for Wrist stimulation   In the R Median - Digit III (Antidromic) study  o the response was considered absent for Wrist stimulation   In the L Ulnar - Digit V  (Antidromic) study  o the response was considered absent for Wrist stimulation   In the R Ulnar - Digit V (Antidromic) study  o the response was considered absent for Wrist stimulation    The needle EMG study was abnormal in all 12 tested muscles.   Abnormal spontaneous/insertional activity was found in L. Deltoid, L. Biceps brachii, L. Triceps brachii, L. Pronator teres, L. First dorsal interosseous, L. Cervical paraspinals, L. Thoracic paraspinals, R. Pronator teres, R. First dorsal interosseous.   The MUP waveform abnormality was found in L. Deltoid, L. Biceps brachii, L. Triceps brachii, L. Pronator teres, L. First dorsal interosseous, R. Triceps brachii, R. Pronator teres, R. First dorsal interosseous.   Abnormal interference pattern was found in R. Deltoid, R. Biceps brachii.      Electrodiagnostic Impression:  1. 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  2. Needle EMG sampling of the muscles throughout the bilateral upper extremities showed findings of active axonal denervation and/or chronic neurogenic change.  Findings were similar to the previous EMG study from 01/24/2022.  Abnormal findings also include the cervical, lumbar, and thoracic paraspinal musculature.  Large duration motor units and reduced recruitment were observed in several muscles during needle EMG sampling of the upper extremities, which is indicative of a neuropathic type process.    Plan:  While today's testing does confirm the presence of generalized severe peripheral polyneuropathy, there does appear to be an additional overlapping generalized neurologic process at play.  Today's testing combined with previous EMG from 01/24/2022 is suggestive of motor neuron disease.  I reached out to Neurology to expedite a consultation for potential for further workup towards a definitive diagnosis.  Today's test results will also be sent back to  his primary care physician for further review and direction in his treatment.    Thank you very much for the referral. Please call if you have any questions regarding this study or the report.       ___________________________  Tucker Burkett M.D.

## 2022-03-09 NOTE — TELEPHONE ENCOUNTER
Called patient to schedule appointment. No answer. Left message to return call. Message also sent on [employer].

## 2022-03-09 NOTE — TELEPHONE ENCOUNTER
Spoke with patient and offered appointment on 3/15/22 at 11 am. Patient declined appointment stating he needs latest appointment of the day. Advised that would be 6/7 at 3 pm. Advised Dr. Burkett would like for patient to be seen ASAP. Patient agreed to appointment on 5/3/22 at 1 pm.

## 2022-03-10 ENCOUNTER — TELEPHONE (OUTPATIENT)
Dept: NEUROLOGY | Facility: CLINIC | Age: 67
End: 2022-03-10
Payer: OTHER GOVERNMENT

## 2022-03-15 ENCOUNTER — OFFICE VISIT (OUTPATIENT)
Dept: NEUROLOGY | Facility: CLINIC | Age: 67
End: 2022-03-15
Payer: OTHER GOVERNMENT

## 2022-03-15 VITALS
BODY MASS INDEX: 26.56 KG/M2 | WEIGHT: 185.5 LBS | DIASTOLIC BLOOD PRESSURE: 75 MMHG | SYSTOLIC BLOOD PRESSURE: 167 MMHG | HEART RATE: 63 BPM | HEIGHT: 70 IN

## 2022-03-15 DIAGNOSIS — E11.44 DIABETIC AMYOTROPHY ASSOCIATED WITH TYPE 2 DIABETES MELLITUS: Primary | ICD-10-CM

## 2022-03-15 DIAGNOSIS — G62.9 PERIPHERAL POLYNEUROPATHY: ICD-10-CM

## 2022-03-15 DIAGNOSIS — G25.0 ESSENTIAL TREMOR: ICD-10-CM

## 2022-03-15 PROCEDURE — 99205 OFFICE O/P NEW HI 60 MIN: CPT | Mod: S$PBB,,, | Performed by: PSYCHIATRY & NEUROLOGY

## 2022-03-15 PROCEDURE — 99215 OFFICE O/P EST HI 40 MIN: CPT | Mod: PBBFAC,PO | Performed by: PSYCHIATRY & NEUROLOGY

## 2022-03-15 PROCEDURE — 99999 PR PBB SHADOW E&M-EST. PATIENT-LVL V: CPT | Mod: PBBFAC,,, | Performed by: PSYCHIATRY & NEUROLOGY

## 2022-03-15 PROCEDURE — 99999 PR PBB SHADOW E&M-EST. PATIENT-LVL V: ICD-10-PCS | Mod: PBBFAC,,, | Performed by: PSYCHIATRY & NEUROLOGY

## 2022-03-15 PROCEDURE — 99205 PR OFFICE/OUTPT VISIT, NEW, LEVL V, 60-74 MIN: ICD-10-PCS | Mod: S$PBB,,, | Performed by: PSYCHIATRY & NEUROLOGY

## 2022-03-15 NOTE — PATIENT INSTRUCTIONS
Essential tremor (ET).  We discussed that ET tends to progress slowly over time, and that tremor can transiently worsen with illness, fatigue, anxiety, stress, and/or excessive caffeine use. We discussed that up to 50% of persons with ET can experience gait balance problems. We discussed that there is no cure or disease-modifying therapy available for ET, only symptomatic therapy.  Propranolol is often a good option.  Will see if this might be something Dr. Gallardo is ok with you trying.      Diabetic amyotrophy:  This is a condition that causes loss of blood flow the large nerves in the groin that operate the lower extremities.  This tends to occur in patients with poorly controlled DM.  It is initially progressive, but then starts to improve over 1-2 years.  There is no treatment other than controlling blood sugar and physical therapy.

## 2022-03-15 NOTE — PROGRESS NOTES
NEUROLOGY  Outpatient CONSULT  [facility]  [address] [facility], [place] [address]  [phone] (office) / [phone] (fax)    Patient Name:  Mikey Ramirez  :  1955  MR #:  [id number]  Acct #:  [id number]    Date of Neurology Consult: 03/15/2022  Name of Neurologist: Eloisa Frank D.O, ABPN, AOBNP, ABEM    Other Physicians:  Branden Gallardo MD (Primary Care Physician); Tucker Burkett, Sabrina (Referring)      Chief Complaint: Extremity Weakness and Peripheral Neuropathy      History of Present Illness (HPI):  Mikey Ramirez is a 66 y.o. male referred by Dr. Burkett for consultation regarding an abnormal EMG and weakness.    Patient states he has a history of diabetes.  He let it go for a year or more.  He states he lost a significant amount of weight and started to become very weak early .  He doesn't recall any significant groin or hip pain at that time.  Prior to this he had no lower extremity symptoms.  He states he had no numbness and tingling.  He can't specifically identify the timing of the weakness and the numbness 2 years ago, ie which came first.  His wife mentions that family saw him a few months before this and commented that he looked ill and was getting very thin (thought he had cancer).    He also started to notice significant loss of strength in the lower extremities from the waist down.  He was started on his medications for diabetes after getting re-established with a PCP.  He started gaining weight and getting his glucose under control.    His strength is not improving.  He feels unsteady on his feet, particularly right after he stands up.  If he stops, he has trouble starting again as well.  He is currently using a cane.  He states the imbalance and unsteadiness is resolved just by touching something.  He states he can move his legs, but standing is very problematic.  He has numbness in his feet, but not up the legs.    He states his gait is sloppy.  He  can't get up a single step without using handrails.  He can't get up from the ground when he plays with his dogs.   He has to lift his legs up into bed or in the car.  He has to use his arms to stand from a chair.  He can't get in and out of the boat to go fishing.  He is very careful and is wary of falling.  He has not fallen.  He is not tripping.  He feels very unsteady on uneven ground.    He denies any issues in the upper extremities.  He has tingling in the fingers.  They feel cold.      He has no muscle cramps.  He has no spasms.      He has tremor in his upper limbs, the left arm more than the right.      He has no changes in his speech.      He has a prostate history, but on medication he denies any bladder issues.    He has no bowel issues currently.  The constipation issues he was having have resolved after changing his pain medications.      He has pain that comes and goes in the muscles.  He has extreme sensitivity to the skin of his thighs.      He has no history of cancer or chemotherapy.  He does drink ETOH, but rarely.    He has no family history of any neurological diseases.      Due to the pain, he borrowed his wife's gabapentin to help.  He couldn't sleep due to the pain.  The gabapentin helped.     Treatment to date:    N/A    Review of Systems:   General: Weight gain: Yes, Weight Loss: Yes, Fatigue: No,   Fever: No, Chills: No, Night Sweats: No, Insomnia: No, Excessive sleeping: No   Respiratory:  Cough: No, Shortness of Breath: No,   Wheezing: No, Excessive Snoring: No, Coughing up blood: No  Endocrine: Heat Intolerance: No, Cold Intolerance: No,   Excessive Thirst: No, Excessive Hunger: No,   Eyes:  Blurred Vision: No, Double Vision: No,   Light Sensitivity: Yes, Eye pain: No  Musculoskeletal: Muscle Aches/Pain: Yes, Joint Pain/Swelling: No, Muscle Cramps: No, Muscle Weakness: Yes, Neck Pain: No, Back Pain: No   Neurological: Difficulty Walking/Falls: Yes, Headache Migraine: No,  Dizziness/Vertigo: No, Fainting: No, Difficulty with Speech: No, Weakness: Yes, Tingling/Numbness: Yes, Tremors: Yes, Memory Problems: No, Seizures: No, Difficulty Swallowing: No, Altered Taste: Yes.  Cardiovascular: Chest Pain: No, Shortness of Breath: No,   Palpitations: No,  Gastrointestinal: Nausea/Vomiting: No, Constipation: Yes, Diarrhea: No, Bloody Stools: No   Psych/Cog:  Depression: No, Anxiety: No, Hallucinations: No, Problems Concentrating: No  : Frequent Urination: No, Incontinence: No, Blood of Urine: No, Urinary Infections: No, Changes in Sex Drive: No   ENT:Hearing Loss: No, Earache: No, Ringing in Ears: No,   Facial Pain: No, Chronic Congestion: No   Immune: Seasonal Allergies: No, Hives and/or Rashes: No  The remainder of the review of twelve body systems was reviewed and normal.    Past Medical, Surgical, Family & Social History:   Past Medical History:   Diagnosis Date    GERD (gastroesophageal reflux disease)     High cholesterol     Hypertension     Type 2 diabetes mellitus     LBSL 140 today --- running high     Past Surgical History:   Procedure Laterality Date    [place]    YAG CAP Bilateral 2012     Family History   Problem Relation Age of Onset    Diabetes Mother     Amblyopia Neg Hx     Blindness Neg Hx     Cancer Neg Hx     Cataracts Neg Hx     Glaucoma Neg Hx     Hypertension Neg Hx     Macular degeneration Neg Hx     Retinal detachment Neg Hx     Strabismus Neg Hx     Stroke Neg Hx     Thyroid disease Neg Hx      Alcohol use:  reports current alcohol use.   (Of note, 0.6 oz = 1 beer or 6 oz = 10 beers).  Tobacco use:  reports that he has never smoked. He has never used smokeless tobacco.  Street drug use:  reports no history of drug use.  Allergies: No known allergies.    Home Medications:     Current Outpatient Medications:     aspirin (ECOTRIN) 81 MG EC tablet, Take 81 mg by mouth once daily., Disp: , Rfl:     blood sugar  "diagnostic (FREESTYLE LITE STRIPS) Strp, USE TO CHECK BLOOD GLUCOSE ONCE DAILY, Disp: 200 strip, Rfl: 2    blood-glucose meter kit, To check BG 1 times daily, to use with insurance preferred meter. ICD10: E11., Disp: 1 each, Rfl: 1    blood-glucose meter,continuous (DEXCOM G6 ) Misc, Use as directed, Disp: 1 each, Rfl: 1    gabapentin (NEURONTIN) 300 MG capsule, Take 1 capsule (300 mg total) by mouth 3 (three) times daily., Disp: 270 capsule, Rfl: 3    lancets (FREESTYLE LANCETS) 28 gauge Misc, TO CHECK BLOOD GLUCOSE ONCE DAILY, Disp: 100 each, Rfl: 2    LANTUS SOLOSTAR U-100 INSULIN glargine 100 units/mL (3mL) SubQ pen, INJECT 50 UNITS UNDER THE SKIN ONCE DAILY, Disp: 45 mL, Rfl: 1    lisinopriL (PRINIVIL,ZESTRIL) 20 MG tablet, Take 1 tablet (20 mg total) by mouth once daily., Disp: 90 tablet, Rfl: 3    LUTEIN ORAL, Take by mouth., Disp: , Rfl:     pen needle, diabetic 32 gauge x 5/16" Ndle, 1 each by Misc.(Non-Drug; Combo Route) route as needed., Disp: 100 each, Rfl: 0    rosuvastatin (CRESTOR) 5 MG tablet, Take 1 tablet (5 mg total) by mouth once daily., Disp: 90 tablet, Rfl: 3    semaglutide (OZEMPIC) 0.25 mg or 0.5 mg(2 mg/1.5 mL) pen injector, 0.25 mg subQ once weekly for 4 weeks, then increase to 0.5 mg once weekly (Patient not taking: Reported on 3/15/2022), Disp: 2 pen, Rfl: 2    Physical Examination:  BP (!) 167/75 (BP Location: Right arm, Patient Position: Sitting, BP Method: Small (Automatic))   Pulse 63   Ht 5' 10" (1.778 m)   Wt 84.1 kg (185 lb 8.3 oz)   BMI 26.62 kg/m²     GENERAL:  General appearance: Well, non-toxic appearing.  No apparent distress.  Fundi exam: normal.  Neck: supple.  Carotid auscultation: normal.  Heart auscultation: normal.  Peripheral pulses: normal.  Extremities: normal.    MENTAL STATUS:  Alertness, attention span & concentration: normal.  Language: normal.  Orientation to self, place & time:  normal.  Memory, recent & remote: normal.  Fund of knowledge: " normal.    SPEECH:  Clear and fluent.  Follows complex commands.    CRANIAL NERVES:  Cranial Nerves II-XII were examined.  II - Visual fields: normal.  III, IV, VI: PERRL, EOMI, No ptosis, No nystagmus.  V - Facial sensation: normal.  VII - Face symmetry & mobility: normal.  VIII - Hearing: normal.  IX, X - Palate: mobile & midline.  XI - Shoulder shrug: normal.  XII - Tongue protrusion: normal.    GROSS MOTOR:  Gait & station: slight steppage gait, able to walk on toes and heels when holding wall.  Able to ambulate without cane, but feels more steady with it.    Tone: normal, no spasticity or rigidity.  Abnormal movements: bilateral upper extremity tremor, mainly intention type.  Finger-nose & Heel-knee-shin: normal.  Rapid alternating movements & drift: normal.    MUSCLE STRENGTH:     Fascics Atrophy RIGHT    LEFT Atrophy Fascics     5 Neck Ext. 5       5 Neck Flex 5       5 Deltoids 5       5 Sh.Ext.Rot. 5       5 Sh.Int.Rot. 5       5 Biceps 5       5 Triceps 5       5 Forearm.Pr. 5       5 Wrist Ext. 5       5 Wrist Flex 5       5 Finger Ext. 5       5 Finger Flex 5       5 FPL 5       5 Inteross. 5                         3 Iliopsoas 3       5 Hip Abduct 5       5 Hip Adduct 5       5 Quads 5       5 Hams 5       5 Dorsiflex 4       5 Plantar Flex 5       5 Ankle Clovis 4       5 Ankle Invert 4       5 Toe Ext. 5       5 Toe Flex 5                         REFLEXES:    RIGHT Reflex   LEFT   tr Biceps tr   tr Brachiorad. tr   tr Triceps tr   0 Pectoralis 0   0 Jaw Jerk 0   0 Webb's 0        0 Patellar tr   0 Ankle 0   0 Suprapatellar 0             Down PLANTAR Down         SENSORY:  Light touch: Normal throughout.  Sharp touch: patchy sensory changes in the distal lower extremities as well as left lateral thigh.  Vibration: stocking pattern loss of vibration, improves at ankle then normal at the knees.  UE normal      Diagnostic Data Reviewed:   Referral records were reviewed.     EMG with Dr. Burkett  3/8/22  1. 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  2. Needle EMG sampling of the muscles throughout the bilateral upper extremities showed findings of active axonal denervation and/or chronic neurogenic change.  Findings were similar to the previous EMG study from 01/24/2022.  Abnormal findings also include the cervical, lumbar, and thoracic paraspinal musculature.  Large duration motor units and reduced recruitment were observed in several muscles during needle EMG sampling of the upper extremities, which is indicative of a neuropathic type process.    EMG with Dr. Burkett 1/24/22  1. 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  2. Needle EMG sampling of the bilateral lower extremities revealed abnormal findings in all muscles sampled.  Abnormal spontaneous activity was observed in all muscles.  Considering the diffuse nature of abnormalities, a diagnosis for a definitive neuro pathologic process cannot be made at this time.  Potential etiologies include motor neuron disease, bilateral lumbosacral plexopathy (including diabetic amyotrophy), and/or a combination of multilevel lumbosacral radiculopathy and severe peripheral polyneuropathy.  While motor unit analysis revealed findings more suggestive of a neuropathic type process, a myopathic process is also possibility, although lower on the differential. Correlation required to dictate further testing.    Component      Latest Ref Rng & Units 1/5/2022   Sodium      136 - 145 mmol/L 136   Potassium      3.5 - 5.1 mmol/L 4.4   Chloride      95 - 110 mmol/L 104   CO2      23 - 29 mmol/L 23   Glucose      70 - 110 mg/dL 153 (H)   BUN      8 - 23 mg/dL 17   Creatinine      0.5 - 1.4 mg/dL 0.6   Calcium      8.7 - 10.5 mg/dL 9.4   PROTEIN TOTAL      6.0 - 8.4 g/dL 5.9 (L)    Albumin      3.5 - 5.2 g/dL 3.3 (L)   BILIRUBIN TOTAL      0.1 - 1.0 mg/dL 0.4   Alkaline Phosphatase      55 - 135 U/L 66   AST      10 - 40 U/L 37   ALT      10 - 44 U/L 39   Anion Gap      8 - 16 mmol/L 9   eGFR if African American      >60 mL/min/1.73 m:2 >60.0   eGFR if non African American      >60 mL/min/1.73 m:2 >60.0   WBC      3.90 - 12.70 K/uL 5.15   RBC      4.60 - 6.20 M/uL 4.23 (L)   Hemoglobin      14.0 - 18.0 g/dL 12.3 (L)   Hematocrit      40.0 - 54.0 % 36.7 (L)   MCV      82 - 98 fL 87   MCH      27.0 - 31.0 pg 29.1   MCHC      32.0 - 36.0 g/dL 33.5   RDW      11.5 - 14.5 % 13.1   Platelets      150 - 450 K/uL 345   MPV      9.2 - 12.9 fL 10.8   TSH      0.400 - 4.000 uIU/mL 1.491   CPK      20 - 200 U/L 474 (H)       Component      Latest Ref Rng & Units 1/5/2022 9/22/2021 6/10/2021 2/26/2018   Hemoglobin A1C External      4.0 - 5.6 % 10.1 (H) 10.7 (H) >14.0 (H) 11.3 (H)   Estimated Avg Glucose      68 - 131 mg/dL 243 (H) 260 (H) Unable to calculate 278 (H)       Assessment and Plan:  Mikey Ramirez is a 66 y.o., man with a history of poorly controlled DM managed by his medical doctor who presents with symptoms of progressive weakness, gait imbalance, and sensory changes.  His progression has been less than a year, though he noted symptoms as much as 2 years ago.  On exam he has low reflexes, loss of strength, tremor and patchy sensory changes.    His EMG reveals a lot of active denervation suggesting there is a superimposed precess and this is not as simple as diabetic neuropathy alone.      We had a long discussion today and reviewed that this could be a disease of the nerves, muscles or both.  We reviewed that there were concerns this is progressive.    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  We will continue to watch him closely, but will initiate physical therapy to start rehabbing this issue.  It can take   1-2 years to recover from this condition.  Unfortunately there is no treatment, only therapy and time.      The patient will return to clinic in 2 months.    Important to note, also  has a past medical history of GERD (gastroesophageal reflux disease), High cholesterol, Hypertension, and Type 2 diabetes mellitus.    Time Spent: I spent a total of 64 minutes on the day of the visit.This includes face to face time and non-face to face time preparing to see the patient (eg, review of tests), Obtaining and/or reviewing separately obtained history, Documenting clinical information in the electronic or other health record, Independently interpreting resultsand communicating results to the patient/family/caregiver, or Care coordination.          Eloisa Frank D.O, ABPN, AOBNP, ABEM    This note was created with voice recognition software.  Grammatical, syntax and spelling errors may be inevitable.

## 2022-03-24 ENCOUNTER — TELEPHONE (OUTPATIENT)
Dept: NEUROLOGY | Facility: CLINIC | Age: 67
End: 2022-03-24
Payer: OTHER GOVERNMENT

## 2022-03-24 NOTE — TELEPHONE ENCOUNTER
Received via fax from "[employer]".  authorization/order # [id number] for physical therapy.  Documentation will be sent to scanning.

## 2022-03-28 ENCOUNTER — CLINICAL SUPPORT (OUTPATIENT)
Dept: REHABILITATION | Facility: HOSPITAL | Age: 67
End: 2022-03-28
Attending: PSYCHIATRY & NEUROLOGY
Payer: OTHER GOVERNMENT

## 2022-03-28 DIAGNOSIS — E11.44 DIABETIC AMYOTROPHY ASSOCIATED WITH TYPE 2 DIABETES MELLITUS: ICD-10-CM

## 2022-03-28 DIAGNOSIS — R29.898 LEG WEAKNESS, BILATERAL: ICD-10-CM

## 2022-03-28 DIAGNOSIS — R26.9 GAIT DIFFICULTY: ICD-10-CM

## 2022-03-28 PROCEDURE — 97162 PT EVAL MOD COMPLEX 30 MIN: CPT | Mod: PO

## 2022-03-28 NOTE — PLAN OF CARE
[facility]  Physical Therapy Initial Evaluation    Name: Mikey Ramirez  Clinic Number: [id number]    Therapy Diagnosis:   Encounter Diagnosis   Name Primary?    Diabetic amyotrophy associated with type 2 diabetes mellitus      Physician: Eloisa Frank,*    Physician Orders: PT Eval and Treat   Medical Diagnosis from Referral: E11.44 (ICD-10-CM) - Diabetic amyotrophy associated with type 2 diabetes mellitus     Evaluation Date: 3/28/2022  Authorization Period Expiration: 7/16/22  Plan of Care Expiration: 5/28/22  Visit # / Visits authorized: 1/ 1    Time In: 1620  Time Out: 1715  Total Billable Time: 45 minutes    Precautions: Standard, Diabetes and Fall    Subjective   Date of onset: : a couple of years  History of current condition - Mikey reports: over the past 2 years he has had progressive LE weakness and loss of balance . His right side LE is stronger than left. He hasn't had a lot of difficulty  with his UE. He needs to use a walking stick to help steady him but can ambulate w/o it. He has trouble walking after initially standing up . He has some pain with any pressure to his thighs and or buttocks        Past Medical History:   Diagnosis Date    GERD (gastroesophageal reflux disease)     High cholesterol     Hypertension     Type 2 diabetes mellitus     LBSL 140 today --- running high     Mikey Ramirez  has a past surgical history that includes YAG CAP (Bilateral, 2012) and Cataract extraction (Bilateral, 2003).    Mikey has a current medication list which includes the following prescription(s): aspirin, blood sugar diagnostic, blood-glucose meter, dexcom g6 , gabapentin, lancets, lantus solostar u-100 insulin, lisinopril, lutein, pen needle, diabetic, rosuvastatin, and semaglutide.    Review of patient's allergies indicates:   Allergen Reactions    No known allergies         Imaging, none:   EMG:Electrodiagnostic Impression:  1. There was electrodiagnostic evidence of  severe generalized distal sensory-motor polyneuropathy with both axonal and demyelinating components.  2. Needle EMG sampling of the bilateral lower extremities revealed abnormal findings in all muscles sampled.  Abnormal spontaneous activity was observed in all muscles.  Considering the diffuse nature of abnormalities, a diagnosis for a definitive neuro pathologic process cannot be made at this time.  Potential etiologies include motor neuron disease, bilateral lumbosacral plexopathy (including diabetic amyotrophy), and/or a combination of multilevel lumbosacral radiculopathy and severe peripheral polyneuropathy.  While motor unit analysis revealed findings more suggestive of a neuropathic type process, a myopathic process is also possibility, although lower on the differential. Correlation required to dictate further testing.      Prior Therapy: none  Social History:  lives with their spouse  Occupation:   Prior Level of Function: Independent   Current Level of Function: Modified Independent     Pain:  Current 2/10, worst 6/10, best 2/10   Location: bilateral Glute   Description: Aching and Dull  Aggravating Factors: Sitting and pressure on thigh/buttocks  Easing Factors: changing position    Pts goals: increase his strength and balance so he can do more functionally     Objective   Mental status: oriented x3  Posture/ Alignment: Fair    GAIT DEVIATIONS: Mikey amb with decreased velocity of limb motion, decreased step length, decreased stride length, increased stride width and decreased weight-shifting ability.with walking stick and decreased balance, steppage type gait    Decreased standing balance with narrow stance with increased sway with Eyes open and closed, unable to  staggered stance    30 sec sit to stand test: 5  TU.23    Strength   Right LE Left LE   Hip flexion 3-/5 3-/5   Hip extension      Hip abduction 4-/5 4-/5   Hip adduction 4/5 4/5   Knee flexion 4+/5 4+/5   Knee extension  4/5 4/5        Peroneals 4/5 4-/5   Tibialis anterior 4+/5 4/5   Tibialis posterior 4+/5 4/5   Gatroc/soleus 4+/5 4+/5       ROM: shoulder    AROM Right Left Comment   Shoulder Flexion: WNL WNL    Shoulder Abduction: WNL WNL    Shoulder ER: WNL WNL    Shoulder Ir:  WNL WNL    *pain    Strength:      Right Left Comment   Shoulder flexion: 5/5 5/5    Shoulder Abduction: 5/5 5/5    Shoulder ER: 4-/5 4-/5    Shoulder IR: 5/5 5/5    Lower Trap: 4/5 4/5    Middle Trap: 4/5 4/5                  Reflexes  Patellar: right/left  0/trace  Achilles: right/left:0/0      Palpation: + TTP quads      Pt/family was provided educational information, including: role of PT, goals for PT, scheduling - pt verbalized understanding. Discussed insurance plan with pt.     CMS Impairment/Limitation/Restriction for FOTO LE Survey    Therapist reviewed FOTO scores for Mikey Ramirez on 3/28/2022.   FOTO documents entered into [employer] - see Media section.    Limitation Score: 60%           Home Exercises and Patient Education Provided    Education provided:   - progression of exercises  - may be a little sore    Written Home Exercises Provided: Plan to give on 2nd-3rd visit after we see how he does with exercises.  Exercises were reviewed and Mikey was able to demonstrate them prior to the end of the session.  Mikey demonstrated good  understanding of the education provided.     See EMR under N/A for exercises provided N/A.      Assessment   . Pt presents with progressive LE weakness and decreased proprioception/balance leading to decreased overall function with ADLs. He has gross LE weakness and some more localized shoulder UE weakness . His UE weakness does not affect his ADLs per self report. He should improve to some degree with a progressive exercises program to work on LE strengthening and proprioception to help increase his overall function and safety with weight bearing task. He is reporting his blood glucose level is under better control now.      Pt prognosis is Good.   Pt will benefit from skilled outpatient Physical Therapy to address the deficits stated above and in the chart below, provide pt/family education, and to maximize pt's level of independence.     Plan of care discussed with patient: Yes  Pt's spiritual, cultural and educational needs considered and patient is agreeable to the plan of care and goals as stated below:     Anticipated Barriers for therapy: none    Medical Necessity is demonstrated by the following  History  Co-morbidities and personal factors that may impact the plan of care Co-morbidities:   advanced age, diabetes, HTN and high cholesterol, GERD    Personal Factors:   coping style  lifestyle     moderate   Examination  Body Structures and Functions, activity limitations and participation restrictions that may impact the plan of care Body Regions:   lower extremities  upper extremities    Body Systems:    gross symmetry  ROM  strength  gross coordinated movement  balance  gait  transfers  transitions  motor control    Participation Restrictions:   Work timining    Activity limitations:   Learning and applying knowledge  no deficits    General Tasks and Commands  no deficits    Communication  no deficits    Mobility  lifting and carrying objects  walking  driving (bike, car, motorcycle)    Self care  dressing    Domestic Life  shopping  cooking  doing house work (cleaning house, washing dishes, laundry)    Interactions/Relationships  no deficits    Life Areas  employment    Community and Social Life  community life  recreation and leisure         moderate   Clinical Presentation evolving clinical presentation with changing clinical characteristics moderate   Decision Making/ Complexity Score: moderate     Goals:  Short Term Goals: 3-4 weeks   Increase hip flexion  strength by 1/2 grade  Increase hip Abd strength by 1/2 grade  Increase left ankle EV/IV by 1/2 grade  Increase sit to  30 sec by 4 or more reps  Decrease TUG  by 2 secs or more    Long Term Goals: 8 weeks   Able to ambulate on tuff field w/o assisted device x 40 feet safely  Increase sit to  30 sec to 13 or more  Able to stand narrow stance w/o sway  Able to ambulate with walking stick x 400 feet w/o break  Increase LE strength to 4+/5 grossly      Plan   Plan of care Certification: 3/28/2022 to 6/10/22.    Outpatient Physical Therapy 2 times weekly for 10 weeks to include the following interventions: Gait Training, Manual Therapy, Neuromuscular Re-ed, Patient Education, Self Care, Therapeutic Activities and Therapeutic Exercise.     Sujit Urbina, PT

## 2022-03-29 PROBLEM — R26.9 GAIT DIFFICULTY: Status: ACTIVE | Noted: 2022-03-29

## 2022-03-29 PROBLEM — R29.898 LEG WEAKNESS, BILATERAL: Status: ACTIVE | Noted: 2022-03-29

## 2022-04-05 ENCOUNTER — CLINICAL SUPPORT (OUTPATIENT)
Dept: REHABILITATION | Facility: HOSPITAL | Age: 67
End: 2022-04-05
Attending: PSYCHIATRY & NEUROLOGY
Payer: OTHER GOVERNMENT

## 2022-04-05 ENCOUNTER — OFFICE VISIT (OUTPATIENT)
Dept: FAMILY MEDICINE | Facility: CLINIC | Age: 67
End: 2022-04-05
Payer: OTHER GOVERNMENT

## 2022-04-05 VITALS
HEIGHT: 70 IN | DIASTOLIC BLOOD PRESSURE: 62 MMHG | WEIGHT: 186.06 LBS | OXYGEN SATURATION: 99 % | BODY MASS INDEX: 26.64 KG/M2 | SYSTOLIC BLOOD PRESSURE: 126 MMHG | HEART RATE: 68 BPM

## 2022-04-05 DIAGNOSIS — Z79.4 TYPE 2 DIABETES MELLITUS WITH DIABETIC POLYNEUROPATHY, WITH LONG-TERM CURRENT USE OF INSULIN: Primary | ICD-10-CM

## 2022-04-05 DIAGNOSIS — R29.898 LEG WEAKNESS, BILATERAL: Primary | ICD-10-CM

## 2022-04-05 DIAGNOSIS — E11.42 TYPE 2 DIABETES MELLITUS WITH DIABETIC POLYNEUROPATHY, WITH LONG-TERM CURRENT USE OF INSULIN: Primary | ICD-10-CM

## 2022-04-05 DIAGNOSIS — R26.9 GAIT DIFFICULTY: ICD-10-CM

## 2022-04-05 PROCEDURE — 99214 PR OFFICE/OUTPT VISIT, EST, LEVL IV, 30-39 MIN: ICD-10-PCS | Mod: S$PBB,,, | Performed by: INTERNAL MEDICINE

## 2022-04-05 PROCEDURE — 99214 OFFICE O/P EST MOD 30 MIN: CPT | Mod: PBBFAC,PO | Performed by: INTERNAL MEDICINE

## 2022-04-05 PROCEDURE — 82043 UR ALBUMIN QUANTITATIVE: CPT | Performed by: INTERNAL MEDICINE

## 2022-04-05 PROCEDURE — 99214 OFFICE O/P EST MOD 30 MIN: CPT | Mod: S$PBB,,, | Performed by: INTERNAL MEDICINE

## 2022-04-05 PROCEDURE — 97110 THERAPEUTIC EXERCISES: CPT | Mod: PO,CQ

## 2022-04-05 PROCEDURE — 99999 PR PBB SHADOW E&M-EST. PATIENT-LVL IV: CPT | Mod: PBBFAC,,, | Performed by: INTERNAL MEDICINE

## 2022-04-05 PROCEDURE — 82570 ASSAY OF URINE CREATININE: CPT | Performed by: INTERNAL MEDICINE

## 2022-04-05 PROCEDURE — 99999 PR PBB SHADOW E&M-EST. PATIENT-LVL IV: ICD-10-PCS | Mod: PBBFAC,,, | Performed by: INTERNAL MEDICINE

## 2022-04-05 RX ORDER — FLASH GLUCOSE SENSOR
KIT MISCELLANEOUS
Qty: 1 KIT | Refills: 1 | Status: SHIPPED | OUTPATIENT
Start: 2022-04-05 | End: 2022-10-25 | Stop reason: SDUPTHER

## 2022-04-05 RX ORDER — FLASH GLUCOSE SCANNING READER
EACH MISCELLANEOUS
Qty: 4 EACH | Refills: 2 | Status: SHIPPED | OUTPATIENT
Start: 2022-04-05 | End: 2022-10-25 | Stop reason: SDUPTHER

## 2022-04-05 RX ORDER — INSULIN GLARGINE 100 [IU]/ML
52 INJECTION, SOLUTION SUBCUTANEOUS DAILY
Qty: 48 ML | Refills: 1 | Status: SHIPPED | OUTPATIENT
Start: 2022-04-05 | End: 2022-04-08 | Stop reason: SDUPTHER

## 2022-04-05 RX ORDER — INSULIN LISPRO 100 [IU]/ML
10 INJECTION, SOLUTION INTRAVENOUS; SUBCUTANEOUS
Qty: 27 ML | Refills: 3 | Status: SHIPPED | OUTPATIENT
Start: 2022-04-05 | End: 2022-10-25 | Stop reason: SDUPTHER

## 2022-04-05 NOTE — PROGRESS NOTES
"  Subjective     Mikey James is a 66 y.o. old, male here for Follow-up and Medication Refill    Patient is here for follow-up on chronic medical problems  Here with his wife.  66-year-old with PMH of Type 2 IDDM with neuropathy/retinopathy, HTN, HLD, diabetic amyotrophy.    IDDM insufficiently controlled on basal insulin, it's reasonable to start checking 4-5 times daily and obtain a CGM if possible.  FBG's are , excellent most of the time.  Meals very reasonable, almost no carbs for breakfast, maybe 30 g at lunch, and a little more for dinner. Supplementing with protein shakes.    Working with PT for LE weakness, diabetic amyotrophy.    Reviewed neuro notes (unfinished) and EMG/NCS results again.    Review of Systems   Constitutional: Negative.    Neurological: Positive for weakness.   Endo/Heme/Allergies: Negative.      Medications     Outpatient Medications Marked as Taking for the 4/5/22 encounter (Office Visit) with Branden Gallardo MD   Medication Sig Dispense Refill    aspirin (ECOTRIN) 81 MG EC tablet Take 81 mg by mouth once daily.      blood sugar diagnostic (FREESTYLE LITE STRIPS) Strp USE TO CHECK BLOOD GLUCOSE ONCE DAILY 200 strip 2    blood-glucose meter kit To check BG 1 times daily, to use with insurance preferred meter. ICD10: E11. 1 each 1    blood-glucose meter,continuous (DEXCOM G6 ) Misc Use as directed 1 each 1    gabapentin (NEURONTIN) 300 MG capsule Take 1 capsule (300 mg total) by mouth 3 (three) times daily. 270 capsule 3    lancets (FREESTYLE LANCETS) 28 gauge Misc TO CHECK BLOOD GLUCOSE ONCE DAILY 100 each 2    lisinopriL (PRINIVIL,ZESTRIL) 20 MG tablet Take 1 tablet (20 mg total) by mouth once daily. 90 tablet 3    LUTEIN ORAL Take by mouth.      pen needle, diabetic 32 gauge x 5/16" Ndle 1 each by Misc.(Non-Drug; Combo Route) route as needed. 100 each 0    rosuvastatin (CRESTOR) 5 MG tablet Take 1 tablet (5 mg total) by mouth once daily. 90 tablet 3    " "semaglutide (OZEMPIC) 0.25 mg or 0.5 mg(2 mg/1.5 mL) pen injector 0.25 mg subQ once weekly for 4 weeks, then increase to 0.5 mg once weekly 2 pen 2    [DISCONTINUED] LANTUS SOLOSTAR U-100 INSULIN glargine 100 units/mL (3mL) SubQ pen INJECT 50 UNITS UNDER THE SKIN ONCE DAILY 45 mL 1     Objective     /62   Pulse 68   Ht 5' 10" (1.778 m)   Wt 84.4 kg (186 lb 1.1 oz)   SpO2 99%   BMI 26.70 kg/m²   Physical Exam  Assessment and Plan     Type 2 diabetes mellitus with diabetic polyneuropathy, with long-term current use of insulin  -     insulin (LANTUS SOLOSTAR U-100 INSULIN) glargine 100 units/mL (3mL) SubQ pen; Inject 52 Units into the skin once daily.  Dispense: 48 mL; Refill: 1  -     flash glucose scanning reader ([employer] COURTNEY 14 DAY READER) Misc; As directed  Dispense: 4 each; Refill: 2  -     flash glucose sensor (FREESTYLE COURTNEY 14 DAY SENSOR) Kit; As directed  Dispense: 1 kit; Refill: 1  -     Hemoglobin A1C; Future; Expected date: 04/05/2022  -     Microalbumin/Creatinine Ratio, Urine  -     insulin lispro (HUMALOG KWIKPEN INSULIN) 100 unit/mL pen; Inject 10 Units into the skin 3 (three) times daily with meals.  Dispense: 27 mL; Refill: 3  -     Ambulatory referral/consult to Diabetes Education; Future; Expected date: 04/12/2022      Will try and get a Courtney system or dexcom if covered by insurance due to frequently needed testing.  Discussed mealtime insulin. 10 u with lunch and dinner.  Lantus 52 units daily.      Follow up in about 3 months (around 7/5/2022).  ___________________  Branden Gallardo MD  Internal Medicine and Pediatrics  "

## 2022-04-05 NOTE — PROGRESS NOTES
Physical Therapy Daily Treatment Note     Name: Mikey [place]  Clinic Number: [id number]    Therapy Diagnosis:   Encounter Diagnoses   Name Primary?    Leg weakness, bilateral Yes    Gait difficulty      Physician: Eloisa Frank,*    Visit Date: 4/5/2022   Physician Orders: PT Eval and Treat   Medical Diagnosis from Referral: E11.44 (ICD-10-CM) - Diabetic amyotrophy associated with type 2 diabetes mellitus      Evaluation Date: 3/28/2022  Authorization Period Expiration: 7/16/22  Plan of Care Expiration: 5/28/22  Visit # / Visits authorized: 2/ 15     Time In: 4:00 PM  Time Out: 4:45 PM  Total Billable Time: 45 minutes     Precautions: Standard, Diabetes and Fall      Subjective     Pt reports: that he was sore for 3 days post last tx..  He was not issued home exercise program.  Response to previous treatment: soreness  Functional change: none to date    Pain: 0/10  Location: bilateral LEs      Objective     Mikey received therapeutic exercises to develop strength, endurance, ROM, flexibility, core stabilization and Balance for 43 minutes including:    Stat bike x 5 min  Mini Squats 15x  Heel raise 15x  Hip ABD 15x  HS Curl 15x  Hip ext 15x  LAQ with 3 sec hold 15x  SLR with QS 15x  Bridging 15x  Hip ADD with ball 3 sec hold 15x  Clamshell 15x         Mikey participated in neuromuscular re-education activities to improve: Balance, Coordination, Kinesthetic, Sense and Proprioception for 2 minutes. The following activities were included:  NBOS 3 x 30 sec      Home Exercises Provided and Patient Education Provided     Education provided:   - effects of therapy    Written Home Exercises Provided: Patient instructed to cont prior HEP.  Exercises were reviewed and Mikey was able to demonstrate them prior to the end of the session.  Mikey demonstrated good  understanding of the education provided.     See EMR under Patient Instructions for exercises provided prior visit.    Assessment     Mikey adryan today's tx with ther ex and  neuromuscular re-ed well. He did require rest breaks between reps due to fatigue. He struggles with SLR but was able to complete reps with min excursion. Mikey demonstrates good effort with all activities and seems willing to exert max effort, however he is limited by weakness and lack of endurance.    Mikey Is progressing well towards his goals.   Pt prognosis is Good.     Pt will continue to benefit from skilled outpatient physical therapy to address the deficits listed in the problem list box on initial evaluation, provide pt/family education and to maximize pt's level of independence in the home and community environment.     Pt's spiritual, cultural and educational needs considered and pt agreeable to plan of care and goals.    Anticipated barriers to physical therapy: none    Goals:   Short Term Goals: 3-4 weeks   Increase hip flexion  strength by 1/2 grade  Increase hip Abd strength by 1/2 grade  Increase left ankle EV/IV by 1/2 grade  Increase sit to  30 sec by 4 or more reps  Decrease TUG by 2 secs or more     Long Term Goals: 8 weeks   Able to ambulate on tuff field w/o assisted device x 40 feet safely  Increase sit to  30 sec to 13 or more  Able to stand narrow stance w/o sway  Able to ambulate with walking stick x 400 feet w/o break  Increase LE strength to 4+/5 grossly      Plan     Plan of care Certification: 3/28/2022 to 6/10/22.     Outpatient Physical Therapy 2 times weekly for 10 weeks to include the following interventions: Gait Training, Manual Therapy, Neuromuscular Re-ed, Patient Education, Self Care, Therapeutic Activities and Therapeutic Exercise      Steve Coy, PTA

## 2022-04-06 LAB
ALBUMIN/CREAT UR: 18.6 UG/MG (ref 0–30)
CREAT UR-MCNC: 43 MG/DL (ref 23–375)
MICROALBUMIN UR DL<=1MG/L-MCNC: 8 UG/ML

## 2022-04-07 ENCOUNTER — CLINICAL SUPPORT (OUTPATIENT)
Dept: REHABILITATION | Facility: HOSPITAL | Age: 67
End: 2022-04-07
Attending: PSYCHIATRY & NEUROLOGY
Payer: OTHER GOVERNMENT

## 2022-04-07 DIAGNOSIS — R26.9 GAIT DIFFICULTY: ICD-10-CM

## 2022-04-07 DIAGNOSIS — R29.898 LEG WEAKNESS, BILATERAL: Primary | ICD-10-CM

## 2022-04-07 PROCEDURE — 97110 THERAPEUTIC EXERCISES: CPT | Mod: PO,CQ

## 2022-04-07 NOTE — PROGRESS NOTES
Physical Therapy Daily Treatment Note     Name: Mikey [place]  Clinic Number: [id number]    Therapy Diagnosis:   Encounter Diagnoses   Name Primary?    Leg weakness, bilateral Yes    Gait difficulty      Physician: Eloisa Frank,*    Visit Date: 4/7/2022   Physician Orders: PT Eval and Treat   Medical Diagnosis from Referral: E11.44 (ICD-10-CM) - Diabetic amyotrophy associated with type 2 diabetes mellitus      Evaluation Date: 3/28/2022  Authorization Period Expiration: 7/16/22  Plan of Care Expiration: 5/28/22  Visit # / Visits authorized: 3/ 15     Time In: 4:15 PM  Time Out: 5:00 PM  Total Billable Time: 45 minutes     Precautions: Standard, Diabetes and Fall      Subjective     Pt reports: that he has had mm soreness since last tx..  He was not issued home exercise program.  Response to previous treatment: soreness  Functional change: none to date    Pain: 6/10  Location: bilateral LEs      Objective     Mikey received therapeutic exercises to develop strength, endurance, ROM, flexibility, core stabilization and Balance for 43 minutes including:    Stat bike x 5 min  Mini Squats 15x  Heel raise 15x  Hip ABD 15x  HS Curl 15x  Hip ext 15x  LAQ with 3 sec hold 15x  SLR with QS 15x  Bridging 15x  Hip ADD with ball 3 sec hold 15x  Clamshell 15x         Mikey participated in neuromuscular re-education activities to improve: Balance, Coordination, Kinesthetic, Sense and Proprioception for 2 minutes. The following activities were included:  NBOS 3 x 30 sec      Home Exercises Provided and Patient Education Provided     Education provided:   - effects of therapy    Written Home Exercises Provided: Patient instructed to cont prior HEP.  Exercises were reviewed and Mikey was able to demonstrate them prior to the end of the session.  Mikey demonstrated good  understanding of the education provided.     See EMR under Patient Instructions for exercises provided prior visit.    Assessment     Mikey adryan today's tx with ther ex  and neuromuscular re-ed well. He continued to require rest breaks between reps due to fatigue. He still struggles with SLR but was able to complete reps with min excursion. Mikey demonstrates good effort with all activities and seems willing to exert max effort, however he is limited by weakness and lack of endurance.  He was not progressed on this date due to these issues and length of time he experience DOMS post last tx.   Mikey Is progressing well towards his goals.   Pt prognosis is Good.     Pt will continue to benefit from skilled outpatient physical therapy to address the deficits listed in the problem list box on initial evaluation, provide pt/family education and to maximize pt's level of independence in the home and community environment.     Pt's spiritual, cultural and educational needs considered and pt agreeable to plan of care and goals.    Anticipated barriers to physical therapy: none    Goals:   Short Term Goals: 3-4 weeks   Increase hip flexion  strength by 1/2 grade  Increase hip Abd strength by 1/2 grade  Increase left ankle EV/IV by 1/2 grade  Increase sit to  30 sec by 4 or more reps  Decrease TUG by 2 secs or more     Long Term Goals: 8 weeks   Able to ambulate on tuff field w/o assisted device x 40 feet safely  Increase sit to  30 sec to 13 or more  Able to stand narrow stance w/o sway  Able to ambulate with walking stick x 400 feet w/o break  Increase LE strength to 4+/5 grossly      Plan     Plan of care Certification: 3/28/2022 to 6/10/22.     Outpatient Physical Therapy 2 times weekly for 10 weeks to include the following interventions: Gait Training, Manual Therapy, Neuromuscular Re-ed, Patient Education, Self Care, Therapeutic Activities and Therapeutic Exercise      Steve Coy, PTA

## 2022-04-08 ENCOUNTER — PATIENT MESSAGE (OUTPATIENT)
Dept: FAMILY MEDICINE | Facility: CLINIC | Age: 67
End: 2022-04-08
Payer: OTHER GOVERNMENT

## 2022-04-11 ENCOUNTER — CLINICAL SUPPORT (OUTPATIENT)
Dept: REHABILITATION | Facility: HOSPITAL | Age: 67
End: 2022-04-11
Attending: PSYCHIATRY & NEUROLOGY
Payer: OTHER GOVERNMENT

## 2022-04-11 DIAGNOSIS — R29.898 LEG WEAKNESS, BILATERAL: Primary | ICD-10-CM

## 2022-04-11 DIAGNOSIS — R26.9 GAIT DIFFICULTY: ICD-10-CM

## 2022-04-11 PROCEDURE — 97110 THERAPEUTIC EXERCISES: CPT | Mod: PO

## 2022-04-11 NOTE — PROGRESS NOTES
Physical Therapy Daily Treatment Note     Name: Mikey [facility] Number: [id number]    Therapy Diagnosis:   Encounter Diagnoses   Name Primary?    Leg weakness, bilateral Yes    Gait difficulty      Physician: Eloisa Frank,*    Visit Date: 4/11/2022   Physician Orders: PT Eval and Treat   Medical Diagnosis from Referral: E11.44 (ICD-10-CM) - Diabetic amyotrophy associated with type 2 diabetes mellitus      Evaluation Date: 3/28/2022  Authorization Period Expiration: 7/16/22  Plan of Care Expiration: 5/28/22  Visit # / Visits authorized: 4/ 15     Time In: 1450  Time Out: 1536  Total Billable Time: 46 minutes     Precautions: Standard, Diabetes and Fall      Subjective     Pt reports: he is feeling a little better today than last time with less overall soreness than after his 1st visit.     He was not issued home exercise program.  Response to previous treatment: soreness  Functional change: none to date    Pain: 2/10  Location: bilateral LEs      Objective     Mikye received therapeutic exercises to develop strength, endurance, ROM, flexibility, core stabilization and Balance for 44 minutes including:    Stat bike x  6min  Mini Squats 15x  Heel raise 15x  Hip ABD 15x  HS Curl 15x  Hip ext 15x  LAQ with 3 sec hold 15x  SLR with QS 15x  Bridging 15x  Hip ADD with ball 3 sec hold 15x  Clamshell 15x         Mikey participated in neuromuscular re-education activities to improve: Balance, Coordination, Kinesthetic, Sense and Proprioception for 2 minutes. The following activities were included:  NBOS 1 x 30 sec  NBOS: with pertubations: 2 x 30 sec  NBOS looking right/left: 2 x 30 sec      Home Exercises Provided and Patient Education Provided     Education provided:   - effects of therapy    Written Home Exercises Provided: Patient instructed to cont prior HEP.  Exercises were reviewed and Mikey was able to demonstrate them prior to the end of the session.  Mikey demonstrated good  understanding of the education  provided.     See EMR under Patient Instructions for exercises provided prior visit.    Assessment     Mikey has done well with his exercises in the clinic. He has expected decreased strength and endurance but has been able to complete exercises with appropriate amount of soreness.He did well today with most of his diffuculty with hip flexion.      Mikey Is progressing well towards his goals.   Pt prognosis is Good.     Pt will continue to benefit from skilled outpatient physical therapy to address the deficits listed in the problem list box on initial evaluation, provide pt/family education and to maximize pt's level of independence in the home and community environment.     Pt's spiritual, cultural and educational needs considered and pt agreeable to plan of care and goals.    Anticipated barriers to physical therapy: none    Goals:   Short Term Goals: 3-4 weeks   Increase hip flexion  strength by 1/2 grade, progressing, not met  Increase hip Abd strength by 1/2 grade, progressing, not met  Increase left ankle EV/IV by 1/2 grade, progressing, not met  Increase sit to  30 sec by 4 or more reps, progressing, not met  Decrease TUG by 2 secs or more, progressing, not met     Long Term Goals: 8 weeks   Able to ambulate on [place] field w/o assisted device x 40 feet safely, progressing, not met  Increase sit to  30 sec to 13 or more, progressing, not met  Able to stand narrow stance w/o sway, progressing, not met  Able to ambulate with walking stick x 400 feet w/o break, progressing, not met  Increase LE strength to 4+/5 grossly, progressing, not met      Plan     Plan of care Certification: 3/28/2022 to 6/10/22.     Outpatient Physical Therapy 2 times weekly for 10 weeks to include the following interventions: Gait Training, Manual Therapy, Neuromuscular Re-ed, Patient Education, Self Care, Therapeutic Activities and Therapeutic Exercise      Sujit Urbina, PT

## 2022-04-13 ENCOUNTER — CLINICAL SUPPORT (OUTPATIENT)
Dept: REHABILITATION | Facility: HOSPITAL | Age: 67
End: 2022-04-13
Attending: PSYCHIATRY & NEUROLOGY
Payer: OTHER GOVERNMENT

## 2022-04-13 DIAGNOSIS — R26.9 GAIT DIFFICULTY: ICD-10-CM

## 2022-04-13 DIAGNOSIS — R29.898 LEG WEAKNESS, BILATERAL: Primary | ICD-10-CM

## 2022-04-13 PROCEDURE — 97110 THERAPEUTIC EXERCISES: CPT | Mod: PO,CQ

## 2022-04-13 NOTE — PROGRESS NOTES
Physical Therapy Daily Treatment Note     Name: Mikey [facility] Number: [id number]    Therapy Diagnosis:   Encounter Diagnoses   Name Primary?    Leg weakness, bilateral Yes    Gait difficulty      Physician: Eloisa Frank,*    Visit Date: 4/13/2022   Physician Orders: PT Eval and Treat   Medical Diagnosis from Referral: E11.44 (ICD-10-CM) - Diabetic amyotrophy associated with type 2 diabetes mellitus      Evaluation Date: 3/28/2022  Authorization Period Expiration: 7/16/22  Plan of Care Expiration: 5/28/22  Visit # / Visits authorized: 4/ 15     Time In: 4:50 (arrivedl late)  Time Out: 5:20  Total Billable Time: 30 minutes     Precautions: Standard, Diabetes and Fall      Subjective     Pt reports: states that his feet are hurting today with standing and amb     He was not issued home exercise program.  Response to previous treatment: soreness  Functional change: none to date    Pain: 5/10  Location: bilateral LEs      Objective     Mikey received therapeutic exercises to develop strength, endurance, ROM, flexibility, core stabilization and Balance for 27 minutes including:    Stat bike x  3min  Mini Squats 15x  Heel raise 15x  Hip ABD 15x  HS Curl 15x  Hip ext 15x  LAQ with 3 sec hold 15x  Not performed due to time  SLR with QS 15x  Bridging 15x  Hip ADD with ball 3 sec hold 15x  Clamshell 15x         Mikey participated in neuromuscular re-education activities to improve: Balance, Coordination, Kinesthetic, Sense and Proprioception for 3 minutes. The following activities were included:  NBOS 3 x 30 sec on foam   NBOS: with pertubations: 2 x 30 sec NP  NBOS looking right/left: 3 x 30 sec on foam      Home Exercises Provided and Patient Education Provided     Education provided:   - effects of therapy    Written Home Exercises Provided: Patient instructed to cont prior HEP.  Exercises were reviewed and Mikey was able to demonstrate them prior to the end of the session.  Mikey demonstrated good  understanding of  the education provided.     See EMR under Patient Instructions for exercises provided prior visit.    Assessment     Mikey adryan today's tx with ther ex and neuromuscular re-ed well despite above report of fatigue upon arrival. He demonstrated fair bal with good correction with neuromuscular re-ed activities on unstable surface. He has done well with his exercises in the clinic. He has expected decreased strength and endurance but has been able to complete exercises with appropriate amount of soreness.      Mikey Is progressing well towards his goals.   Pt prognosis is Good.     Pt will continue to benefit from skilled outpatient physical therapy to address the deficits listed in the problem list box on initial evaluation, provide pt/family education and to maximize pt's level of independence in the home and community environment.     Pt's spiritual, cultural and educational needs considered and pt agreeable to plan of care and goals.    Anticipated barriers to physical therapy: none    Goals:   Short Term Goals: 3-4 weeks   Increase hip flexion  strength by 1/2 grade, progressing, not met  Increase hip Abd strength by 1/2 grade, progressing, not met  Increase left ankle EV/IV by 1/2 grade, progressing, not met  Increase sit to  30 sec by 4 or more reps, progressing, not met  Decrease TUG by 2 secs or more, progressing, not met     Long Term Goals: 8 weeks   Able to ambulate on [place] field w/o assisted device x 40 feet safely, progressing, not met  Increase sit to  30 sec to 13 or more, progressing, not met  Able to stand narrow stance w/o sway, progressing, not met  Able to ambulate with walking stick x 400 feet w/o break, progressing, not met  Increase LE strength to 4+/5 grossly, progressing, not met      Plan     Plan of care Certification: 3/28/2022 to 6/10/22.     Outpatient Physical Therapy 2 times weekly for 10 weeks to include the following interventions: Gait Training, Manual Therapy,  Neuromuscular Re-ed, Patient Education, Self Care, Therapeutic Activities and Therapeutic Exercise      Steve Coy, PTA

## 2022-04-20 NOTE — PROGRESS NOTES
Physical Therapy Daily Treatment Note     Name: Mikey [facility] Number: [id number]    Therapy Diagnosis:   Encounter Diagnoses   Name Primary?    Leg weakness, bilateral Yes    Gait difficulty      Physician: Eloisa Frank,*    Visit Date: 4/21/2022   Physician Orders: PT Eval and Treat   Medical Diagnosis from Referral: E11.44 (ICD-10-CM) - Diabetic amyotrophy associated with type 2 diabetes mellitus      Evaluation Date: 3/28/2022  Authorization Period Expiration: 7/16/22  Plan of Care Expiration: 5/28/22  Visit # / Visits authorized:5/ 15     Time In:1645  Time Out: 1735  Total Billable Time: 45 minutes     Precautions: Standard, Diabetes and Fall      Subjective     Pt reports:he has done well with his exercise program so far. He had his normal soreness for about a day and a half after last visit. His feet are feeling better today than last visit[place] his last appt.     He was not issued home exercise program.  Response to previous treatment: soreness  Functional change: none to date    Pain: 5/10  Location: bilateral LEs      Objective     Mikey received therapeutic exercises to develop strength, endurance, ROM, flexibility, core stabilization and Balance for 35 minutes including:    Stat bike x  6min  Mini Squats 15x  Leg press: 20lbs x 15  6 inch step ups at stair case x 10 each leg  Heel raise 15x  Standing Hip ABD x 10  Side lying hip abd x 10 each with Assistance  HS Curl 15x  Hip ext 15x  LAQ with 3 sec hold 15x  SLR with QS 15x: not performed  Bridging 15x  Hip ADD with ball 3 sec hold 15x  Clamshell 15x         Mikey participated in neuromuscular re-education activities to improve: Balance, Coordination, Kinesthetic, Sense and Proprioception for 10 minutes. The following activities were included:  NBOS 3 x 30 sec on foam   NBOS: with pertubations: 2 x 30 sec NP  NBOS looking right/left: 3 x 30 sec  Walking holding on with right hand in parallel  bars looking right/left  Staggered stance looking right and left      Home Exercises Provided and Patient Education Provided     Education provided:   - effects of therapy    Written Home Exercises Provided: Patient instructed to cont prior HEP.  Exercises were reviewed and Mikey was able to demonstrate them prior to the end of the session.  Mikey demonstrated good  understanding of the education provided.     See EMR under Patient Instructions for exercises provided prior visit.    Assessment     Mikey was able to advance the difficulty of his exercise program today adding in some new exercises and did well with the appropriate amount of difficulty. He still demonstrated decreased LE coordination and significant weakness but has done well with his in clinic exercises and I am encouraged by his response to date.       Mikey Is progressing well towards his goals.   Pt prognosis is Good.     Pt will continue to benefit from skilled outpatient physical therapy to address the deficits listed in the problem list box on initial evaluation, provide pt/family education and to maximize pt's level of independence in the home and community environment.     Pt's spiritual, cultural and educational needs considered and pt agreeable to plan of care and goals.    Anticipated barriers to physical therapy: none    Goals:   Short Term Goals: 3-4 weeks   Increase hip flexion  strength by 1/2 grade, progressing, not met  Increase hip Abd strength by 1/2 grade, progressing, not met  Increase left ankle EV/IV by 1/2 grade, progressing, not met  Increase sit to  30 sec by 4 or more reps, progressing, not met  Decrease TUG by 2 secs or more, progressing, not met     Long Term Goals: 8 weeks   Able to ambulate on tuff field w/o assisted device x 40 feet safely, progressing, not met  Increase sit to  30 sec to 13 or more, progressing, not met  Able to stand narrow stance w/o sway, progressing, not met  Able to ambulate with walking  stick x 400 feet w/o break, progressing, not met  Increase LE strength to 4+/5 grossly, progressing, not met      Plan     Plan of care Certification: 3/28/2022 to 6/10/22.     Outpatient Physical Therapy 2 times weekly for 10 weeks to include the following interventions: Gait Training, Manual Therapy, Neuromuscular Re-ed, Patient Education, Self Care, Therapeutic Activities and Therapeutic Exercise      Sujit Urbina, PT

## 2022-04-21 ENCOUNTER — CLINICAL SUPPORT (OUTPATIENT)
Dept: REHABILITATION | Facility: HOSPITAL | Age: 67
End: 2022-04-21
Attending: PSYCHIATRY & NEUROLOGY
Payer: OTHER GOVERNMENT

## 2022-04-21 DIAGNOSIS — R29.898 LEG WEAKNESS, BILATERAL: Primary | ICD-10-CM

## 2022-04-21 DIAGNOSIS — R26.9 GAIT DIFFICULTY: ICD-10-CM

## 2022-04-21 PROCEDURE — 97110 THERAPEUTIC EXERCISES: CPT | Mod: PO

## 2022-04-21 PROCEDURE — 97112 NEUROMUSCULAR REEDUCATION: CPT | Mod: PO

## 2022-04-28 ENCOUNTER — CLINICAL SUPPORT (OUTPATIENT)
Dept: REHABILITATION | Facility: HOSPITAL | Age: 67
End: 2022-04-28
Attending: PSYCHIATRY & NEUROLOGY
Payer: OTHER GOVERNMENT

## 2022-04-28 DIAGNOSIS — R26.9 GAIT DIFFICULTY: ICD-10-CM

## 2022-04-28 DIAGNOSIS — R29.898 LEG WEAKNESS, BILATERAL: Primary | ICD-10-CM

## 2022-04-28 PROCEDURE — 97112 NEUROMUSCULAR REEDUCATION: CPT | Mod: PO

## 2022-04-28 PROCEDURE — 97110 THERAPEUTIC EXERCISES: CPT | Mod: PO

## 2022-04-28 NOTE — PROGRESS NOTES
Physical Therapy Daily Treatment Note     Name: Mikey [facility] Number: [id number]    Therapy Diagnosis:   Encounter Diagnoses   Name Primary?    Leg weakness, bilateral Yes    Gait difficulty      Physician: Eloisa Frank,*    Visit Date: 4/28/2022   Physician Orders: PT Eval and Treat   Medical Diagnosis from Referral: E11.44 (ICD-10-CM) - Diabetic amyotrophy associated with type 2 diabetes mellitus      Evaluation Date: 3/28/2022  Authorization Period Expiration: 7/16/22  Plan of Care Expiration: 5/28/22  Visit # / Visits authorized:6/ 15     Time In:1645  Time Out: 1735  Total Billable Time: 50 minutes     Precautions: Standard, Diabetes and Fall      Subjective     Pt reports: he felt pretty good after last visit with some mild sorness for about a day after last visit. He has been having some increased R thigh pain over the last few days. .     He was not issued home exercise program.  Response to previous treatment:mild  soreness  Functional change: none to date    Pain: 5/10  Location: bilateral LEs      Objective     Mikey received therapeutic exercises to develop strength, endurance, ROM, flexibility, core stabilization and Balance for 35 minutes including:    Stat bike x  8min  Mini Squats 15x  Leg press: 20lbs x 15  6 inch step ups at stair case x 10 each leg  Heel raise 15x  Standing Hip ABD x 10  Side lying hip abd x 10 each with Assistance  HS Curl 15x: not performed  Hip ext 15x  LAQ with 3 sec hold 15x  SLR with QS 15x: not performed  Bridging 15x  Hip ADD with ball 3 sec hold 15x: not performed  Clamshell 15x         Mikey participated in neuromuscular re-education activities to improve: Balance, Coordination, Kinesthetic, Sense and Proprioception for 10 minutes. The following activities were included:  NBOS 3 x 30 sec on foam   NBOS: with pertubations: 2 x 30 sec NP  NBOS looking right/left: 3 x 30 sec  Walking holding on with right hand in parallel bars looking right/left  Staggered  stance looking right and left      Home Exercises Provided and Patient Education Provided     Education provided:   - effects of therapy    Written Home Exercises Provided: Patient instructed to cont prior HEP.  Exercises were reviewed and Mikey was able to demonstrate them prior to the end of the session.  Mikey demonstrated good  understanding of the education provided.     See EMR under Patient Instructions for exercises provided prior visit.    Assessment     Mikey has done well with his exercises with only mild soreness post exercises. He has been able to slowly advance as would be expected as it will take time. He is reporting better balance with ambulation reenlty     Mikey Is progressing well towards his goals.   Pt prognosis is Good.     Pt will continue to benefit from skilled outpatient physical therapy to address the deficits listed in the problem list box on initial evaluation, provide pt/family education and to maximize pt's level of independence in the home and community environment.     Pt's spiritual, cultural and educational needs considered and pt agreeable to plan of care and goals.    Anticipated barriers to physical therapy: none    Goals:   Short Term Goals: 3-4 weeks   Increase hip flexion  strength by 1/2 grade, progressing, not met  Increase hip Abd strength by 1/2 grade, progressing, not met  Increase left ankle EV/IV by 1/2 grade, progressing, not met  Increase sit to  30 sec by 4 or more reps, progressing, not met  Decrease TUG by 2 secs or more, progressing, not met     Long Term Goals: 8 weeks   Able to ambulate on tuff field w/o assisted device x 40 feet safely, progressing, not met  Increase sit to  30 sec to 13 or more, progressing, not met  Able to stand narrow stance w/o sway, progressing, not met  Able to ambulate with walking stick x 400 feet w/o break, progressing, not met  Increase LE strength to 4+/5 grossly, progressing, not met      Plan     Plan of care  Certification: 3/28/2022 to 6/10/22.     Outpatient Physical Therapy 2 times weekly for 10 weeks to include the following interventions: Gait Training, Manual Therapy, Neuromuscular Re-ed, Patient Education, Self Care, Therapeutic Activities and Therapeutic Exercise      Sujit Urbina, PT

## 2022-05-05 ENCOUNTER — PATIENT MESSAGE (OUTPATIENT)
Dept: FAMILY MEDICINE | Facility: CLINIC | Age: 67
End: 2022-05-05
Payer: OTHER GOVERNMENT

## 2022-05-06 ENCOUNTER — TELEPHONE (OUTPATIENT)
Dept: ADMINISTRATIVE | Facility: HOSPITAL | Age: 67
End: 2022-05-06
Payer: OTHER GOVERNMENT

## 2022-05-09 ENCOUNTER — PATIENT MESSAGE (OUTPATIENT)
Dept: SMOKING CESSATION | Facility: CLINIC | Age: 67
End: 2022-05-09
Payer: OTHER GOVERNMENT

## 2022-05-11 ENCOUNTER — CLINICAL SUPPORT (OUTPATIENT)
Dept: REHABILITATION | Facility: HOSPITAL | Age: 67
End: 2022-05-11
Attending: PSYCHIATRY & NEUROLOGY
Payer: OTHER GOVERNMENT

## 2022-05-11 DIAGNOSIS — R26.9 GAIT DIFFICULTY: ICD-10-CM

## 2022-05-11 DIAGNOSIS — R29.898 LEG WEAKNESS, BILATERAL: Primary | ICD-10-CM

## 2022-05-11 PROCEDURE — 97110 THERAPEUTIC EXERCISES: CPT | Mod: PO,CQ

## 2022-05-11 PROCEDURE — 97112 NEUROMUSCULAR REEDUCATION: CPT | Mod: PO,CQ

## 2022-05-11 NOTE — PROGRESS NOTES
Physical Therapy Daily Treatment Note     Name: [facility] Number: [id number]    Therapy Diagnosis:   Encounter Diagnoses   Name Primary?    Leg weakness, bilateral Yes    Gait difficulty      Physician: Eloisa Frank,*    Visit Date: 5/11/2022   Physician Orders: PT Eval and Treat   Medical Diagnosis from Referral: E11.44 (ICD-10-CM) - Diabetic amyotrophy associated with type 2 diabetes mellitus      Evaluation Date: 3/28/2022  Authorization Period Expiration: 7/16/22  Plan of Care Expiration: 5/28/22  Visit # / Visits authorized:7/ 15     Time In:4:30  Time Out: 5:15  Total Billable Time: 45 minutes     Precautions: Standard, Diabetes and Fall      Subjective     Pt reports: that he is having some pn in R thigh and ankle, reports that he did well after last tx.  .     He was not issued home exercise program.  Response to previous treatment:mild  soreness  Functional change: none to date    Pain: 4/10  Location: R thigh and ankle      Objective     Mikey received therapeutic exercises to develop strength, endurance, ROM, flexibility, core stabilization and Balance for 35 minutes including:    Stat bike x  10 min  Mini Squats 15x NP  Leg press: 20lbs x 15  6 inch step ups at stair case x 10 each leg  Heel raise 15x  Standing Hip ABD x 10 NP  Side lying hip abd x 10 each with Assistance NP  HS Curl 20x:   Hip ext 20x  LAQ with 3 sec hold 15x  SLR with QS 15x: not performed  Bridging 20x  Hip ADD with ball 3 sec hold 15x: not performed  Clamshell 15x         Mikey participated in neuromuscular re-education activities to improve: Balance, Coordination, Kinesthetic, Sense and Proprioception for 10 minutes. The following activities were included:  NBOS 3 x 30 sec on foam   NBOS: with pertubations: 2 x 30 sec NP  NBOS looking right/left: 3 x 30 sec on foam  Walking holding on with right hand in parallel bars looking right/left 5 laps  Staggered stance looking right and left 3x 30 sec      Home Exercises  Provided and Patient Education Provided     Education provided:   - effects of therapy    Written Home Exercises Provided: Patient instructed to cont prior HEP.  Exercises were reviewed and Mikey was able to demonstrate them prior to the end of the session.  Mikey demonstrated good  understanding of the education provided.     See EMR under Patient Instructions for exercises provided prior visit.    Assessment     Mikey adryan today's tx with progression of ther ex well. He did exhibit some fatigue that was relieved with several short rest breaks. He has done well with his exercises with only mild soreness post exercises. He has been able to slowly advance as would be expected as it will take time. He is demonstrating better balance with neuromuscular activities.     Mikey Is progressing well towards his goals.   Pt prognosis is Good.     Pt will continue to benefit from skilled outpatient physical therapy to address the deficits listed in the problem list box on initial evaluation, provide pt/family education and to maximize pt's level of independence in the home and community environment.     Pt's spiritual, cultural and educational needs considered and pt agreeable to plan of care and goals.    Anticipated barriers to physical therapy: none    Goals:   Short Term Goals: 3-4 weeks   Increase hip flexion  strength by 1/2 grade, progressing, not met  Increase hip Abd strength by 1/2 grade, progressing, not met  Increase left ankle EV/IV by 1/2 grade, progressing, not met  Increase sit to  30 sec by 4 or more reps, progressing, not met  Decrease TUG by 2 secs or more, progressing, not met     Long Term Goals: 8 weeks   Able to ambulate on tuff field w/o assisted device x 40 feet safely, progressing, not met  Increase sit to  30 sec to 13 or more, progressing, not met  Able to stand narrow stance w/o sway, progressing, not met  Able to ambulate with walking stick x 400 feet w/o break, progressing, not  met  Increase LE strength to 4+/5 grossly, progressing, not met      Plan     Plan of care Certification: 3/28/2022 to 6/10/22.     Outpatient Physical Therapy 2 times weekly for 10 weeks to include the following interventions: Gait Training, Manual Therapy, Neuromuscular Re-ed, Patient Education, Self Care, Therapeutic Activities and Therapeutic Exercise      Steve Coy, PTA

## 2022-05-12 ENCOUNTER — PATIENT OUTREACH (OUTPATIENT)
Dept: ADMINISTRATIVE | Facility: OTHER | Age: 67
End: 2022-05-12
Payer: OTHER GOVERNMENT

## 2022-05-13 NOTE — PROGRESS NOTES
Health Maintenance Due   Topic Date Due    TETANUS VACCINE  Never done    Shingles Vaccine (1 of 2) Never done    Foot Exam  05/19/2017    COVID-19 Vaccine (3 - Booster for Pfizer series) 10/13/2021    Hemoglobin A1c  04/05/2022     Updates were requested from care everywhere.  Chart was reviewed for overdue Proactive [facility] Encounters (AM[facility]) topics (CRS, Breast Cancer Screening, Eye exam)  Health Maintenance has been updated.  LINKS immunization registry triggered.  Immunizations were reconciled.

## 2022-05-16 ENCOUNTER — OFFICE VISIT (OUTPATIENT)
Dept: NEUROLOGY | Facility: CLINIC | Age: 67
End: 2022-05-16
Payer: OTHER GOVERNMENT

## 2022-05-16 VITALS
HEIGHT: 70 IN | WEIGHT: 192.88 LBS | RESPIRATION RATE: 18 BRPM | BODY MASS INDEX: 27.61 KG/M2 | HEART RATE: 61 BPM | DIASTOLIC BLOOD PRESSURE: 78 MMHG | SYSTOLIC BLOOD PRESSURE: 156 MMHG

## 2022-05-16 DIAGNOSIS — R26.9 GAIT DIFFICULTY: ICD-10-CM

## 2022-05-16 DIAGNOSIS — E11.44 DIABETIC AMYOTROPHY ASSOCIATED WITH TYPE 2 DIABETES MELLITUS: Primary | ICD-10-CM

## 2022-05-16 DIAGNOSIS — Z79.4 TYPE 2 DIABETES MELLITUS WITH DIABETIC POLYNEUROPATHY, WITH LONG-TERM CURRENT USE OF INSULIN: ICD-10-CM

## 2022-05-16 DIAGNOSIS — R29.898 LEG WEAKNESS, BILATERAL: ICD-10-CM

## 2022-05-16 DIAGNOSIS — R74.8 ELEVATED CK: ICD-10-CM

## 2022-05-16 DIAGNOSIS — E11.42 TYPE 2 DIABETES MELLITUS WITH DIABETIC POLYNEUROPATHY, WITH LONG-TERM CURRENT USE OF INSULIN: ICD-10-CM

## 2022-05-16 PROCEDURE — 99999 PR PBB SHADOW E&M-EST. PATIENT-LVL V: ICD-10-PCS | Mod: PBBFAC,,, | Performed by: NURSE PRACTITIONER

## 2022-05-16 PROCEDURE — 99214 PR OFFICE/OUTPT VISIT, EST, LEVL IV, 30-39 MIN: ICD-10-PCS | Mod: S$PBB,,, | Performed by: NURSE PRACTITIONER

## 2022-05-16 PROCEDURE — 99999 PR PBB SHADOW E&M-EST. PATIENT-LVL V: CPT | Mod: PBBFAC,,, | Performed by: NURSE PRACTITIONER

## 2022-05-16 PROCEDURE — 99214 OFFICE O/P EST MOD 30 MIN: CPT | Mod: S$PBB,,, | Performed by: NURSE PRACTITIONER

## 2022-05-16 PROCEDURE — 99215 OFFICE O/P EST HI 40 MIN: CPT | Mod: PBBFAC,PO | Performed by: NURSE PRACTITIONER

## 2022-05-16 NOTE — PROGRESS NOTES
"NEUROLOGY  Outpatient Follow Up Visit     [facility]  [address] [facility] [address], [place] [address]  [phone] (office) / [phone] (fax)    Patient Name:  Mikey Ramirez  :  1955  MR #:  [id number]  Acct #:  [id number]    Date of  Visit: 2022    Other Physicians:  Branden Gallardo MD (Primary Care Physician); No ref. provider found (Referring)      CHIEF COMPLAINT: Extremity Weakness      INTERVAL HISTORY:  Mikey Ramirez is a 66 y.o.  male seen in follow up for diabetic amyotrophy and peripheral neuropathy. The patient is new to me today, but recently established with Dr. Frank    Medical history is otherwise significant for DM2, GERD, HLD, HTN    Here with his wife.     He is doing PT. He feels that his leg strength has improved a bit. Still notes significant weakness in the upper legs as prior (difficulty rising from chair or standing up from floor). 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     Still very sensitive to touch in his upper thighs. Pain is tolerable on gabapentin. He is taking 300 mg BID or TID without notable SE.     Still feels same sense of imbalance. Mainly has trouble if the texture of the ground changes or with any change in slope. He has not had any falls. Using the cane helps him feel more stable.     Still has same numbness in his feet and tingling in his fingertips. No appreciable weakness in the hands or UE.     He has upcoming repeat labs to check his A1C. Monitoring BG daily and notes improvement. Hopeful that his A1C continues to decrease. Managed by his PCP.     PRIOR HISTORY:  hospitals 3/15/22 - [facility]:  "Mikey Ramirez is a 66 y.o. male referred by Dr. Burkett for consultation regarding an abnormal EMG and weakness.     Patient states he has a history of diabetes.  He let it go for a year or more.  He states he lost a significant amount of weight and started to become very weak early .  He doesn't recall any significant " groin or hip pain at that time.  Prior to this he had no lower extremity symptoms.  He states he had no numbness and tingling.  He can't specifically identify the timing of the weakness and the numbness 2 years ago, ie which came first.  His wife mentions that family saw him a few months before this and commented that he looked ill and was getting very thin (thought he had cancer).     He also started to notice significant loss of strength in the lower extremities from the waist down.  He was started on his medications for diabetes after getting re-established with a PCP.  He started gaining weight and getting his glucose under control.     His strength is not improving.  He feels unsteady on his feet, particularly right after he stands up.  If he stops, he has trouble starting again as well.  He is currently using a cane.  He states the imbalance and unsteadiness is resolved just by touching something.  He states he can move his legs, but standing is very problematic.  He has numbness in his feet, but not up the legs.     He states his gait is sloppy.  He can't get up a single step without using handrails.  He can't get up from the ground when he plays with his dogs.   He has to lift his legs up into bed or in the car.  He has to use his arms to stand from a chair.  He can't get in and out of the boat to go fishing.  He is very careful and is wary of falling.  He has not fallen.  He is not tripping.  He feels very unsteady on uneven ground.     He denies any issues in the upper extremities.  He has tingling in the fingers.  They feel cold.       He has no muscle cramps.  He has no spasms.       He has tremor in his upper limbs, the left arm more than the right.       He has no changes in his speech.       He has a prostate history, but on medication he denies any bladder issues.     He has no bowel issues currently.  The constipation issues he was having have resolved after changing his pain medications.       He  "has pain that comes and goes in the muscles.  He has extreme sensitivity to the skin of his thighs.       He has no history of cancer or chemotherapy. He does drink ETOH, but rarely.     He has no family history of any neurological diseases.       Due to the pain, he borrowed his wife's gabapentin to help.  He couldn't sleep due to the pain.  The gabapentin helped."     Allergies:  Review of patient's allergies indicates:   Allergen Reactions    No known allergies        Current Medications:  Current Outpatient Medications   Medication Sig Dispense Refill    aspirin (ECOTRIN) 81 MG EC tablet Take 81 mg by mouth once daily.      blood sugar diagnostic (FREESTYLE LITE STRIPS) Strp USE TO CHECK BLOOD GLUCOSE ONCE DAILY 200 strip 2    blood-glucose meter kit To check BG 1 times daily, to use with insurance preferred meter. ICD10: E11. 1 each 1    blood-glucose meter,continuous (DEXCOM G6 ) Misc Use as directed 1 each 1    flash glucose scanning reader (FREESTYLE COURTNEY 14 DAY READER) Misc As directed 4 each 2    flash glucose sensor (FREESTYLE COURTNEY 14 DAY SENSOR) Kit As directed 1 kit 1    gabapentin (NEURONTIN) 300 MG capsule Take 1 capsule (300 mg total) by mouth 3 (three) times daily. 270 capsule 3    insulin (LANTUS SOLOSTAR U-100 INSULIN) glargine 100 units/mL (3mL) SubQ pen Inject 52 Units into the skin once daily. 48 mL 1    insulin lispro (HUMALOG KWIKPEN INSULIN) 100 unit/mL pen Inject 10 Units into the skin 3 (three) times daily with meals. 27 mL 3    lancets (FREESTYLE LANCETS) 28 gauge Misc TO CHECK BLOOD GLUCOSE ONCE DAILY 100 each 2    lisinopriL (PRINIVIL,ZESTRIL) 20 MG tablet Take 1 tablet (20 mg total) by mouth once daily. 90 tablet 3    LUTEIN ORAL Take by mouth.      pen needle, diabetic 32 gauge x 5/16" Ndle 1 each by Misc.(Non-Drug; Combo Route) route as needed. 100 each 0    rosuvastatin (CRESTOR) 5 MG tablet Take 1 tablet (5 mg total) by mouth once daily. 90 tablet 3    " "semaglutide (OZEMPIC) 0.25 mg or 0.5 mg(2 mg/1.5 mL) pen injector 0.25 mg subQ once weekly for 4 weeks, then increase to 0.5 mg once weekly 2 pen 2     No current facility-administered medications for this visit.       Past Medical History:  Past Medical History:   Diagnosis Date    GERD (gastroesophageal reflux disease)     High cholesterol     Hypertension     Type 2 diabetes mellitus     LBSL 140 today --- running high       Past Surgical History:  Past Surgical History:   Procedure Laterality Date    [place]CTION [place] 2003    [place]    YAG CAP Bilateral 2012       Family History:  family history includes Diabetes in his mother.    Social History:   reports that he has never smoked. He has never used smokeless tobacco. He reports current alcohol use. He reports that he does not use drugs.      PHYSICAL EXAM:  BP (!) 156/78 (BP Location: Right arm, Patient Position: Sitting, BP Method: Medium (Automatic))   Pulse 61   Resp 18   Ht 5' 10" (1.778 m)   Wt 87.5 kg (192 lb 14.4 oz)   BMI 27.68 kg/m²     General: Well groomed. NAD.  Pulmonary: Normal effort and rate.   Musculoskeletal: No obvious joint deformities. Able to MOORE.   Extremities: No clubbing, cyanosis. Mild BLE edema.     Neurological exam:  Mental status: Awake and alert.  Oriented to person, place, time and situation. Fund of knowledge normal.  Speech/Language: Fluent and appropriate. No dysarthria or aphasia.   Cranial nerves (II-XII): Extraocular movements intact, no ptosis, no nystagmus. Facial sensation and symmetry intact bilaterally. Hearing grossly intact. Palate deferred. Shoulder shrug normal bilaterally. Normal tongue protrusion.     GROSS MOTOR:  Gait & station: Mild degree of steppage. Able to ambulate without cane, but feels more steady with it.    Tone: Normal  Abnormal movements: BUE action tremor, bit worse on the L. No rest tremor.   FNF: normal   Rapid alternating movements & drift: normal     MUSCLE STRENGTH: "      Fascics Atrophy RIGHT      LEFT Atrophy Fascics       5 Neck Ext. 5           5 Neck Flex 5           5 Deltoids 5           5 Sh.Ext.Rot. 5           5 Sh.Int.Rot. 5           5 Biceps 5           5 Triceps 5           5 Forearm.Pr. 5           5 Wrist Ext. 5           5 Wrist Flex 5           5 Finger Ext. 5           5 Finger Flex 5           5 FPL 5           5 Inteross. 5                                           3 Iliopsoas 3           5 Hip Abduct 5           5 Hip Adduct 5           5 Quads 5           5 Hams 5           4+ Dorsiflex 4           5 Plantar Flex 5           5 Ankle Clovis 4           5 Ankle Invert 4           5 Toe Ext. 5           5 Toe Flex 5                                          REFLEXES:     RIGHT Reflex    LEFT   0 Biceps 0   0 Brachiorad. 0   0 Triceps 0    Pectoralis     Jaw Jerk     Webb's            tr Patellar tr   0 Ankle 0    Suprapatellar                    Down PLANTAR Down           SENSORY:  Light touch: Normal throughout.  Sharp touch: patchy sensory changes in the RLE (lateral and inner foot, lower leg), decrease in LLE from lower leg to mid calf.   Vibration: Decreased in toes bilaterally, normal at L ankle, normal at R knee, UE normal       DIAGNOSTIC DATA:  I have personally reviewed provider notes, labs and imaging made available to me today.     EMG with Dr. Burkett 3/8/22  1. 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  2. Needle EMG sampling of the muscles throughout the bilateral upper extremities showed findings of active axonal denervation and/or chronic neurogenic change.  Findings were similar to the previous EMG study from 01/24/2022.  Abnormal findings also include the cervical, lumbar, and thoracic paraspinal musculature.  Large duration motor units and reduced recruitment were observed in several muscles during needle EMG sampling of the upper  extremities, which is indicative of a neuropathic type process.     EMG with Dr. Burkett 1/24/22  1. 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  2. Needle EMG sampling of the bilateral lower extremities revealed abnormal findings in all muscles sampled.  Abnormal spontaneous activity was observed in all muscles.  Considering the diffuse nature of abnormalities, a diagnosis for a definitive neuro pathologic process cannot be made at this time.  Potential etiologies include motor neuron disease, bilateral lumbosacral plexopathy (including diabetic amyotrophy), and/or a combination of multilevel lumbosacral radiculopathy and severe peripheral polyneuropathy.  While motor unit analysis revealed findings more suggestive of a neuropathic type process, a myopathic process is also possibility, although lower on the differential. Correlation required to dictate further testing.    Labs:  CBC:   Lab Results   Component Value Date    WBC 5.15 01/05/2022    HGB 12.3 (L) 01/05/2022    HCT 36.7 (L) 01/05/2022     01/05/2022    MCV 87 01/05/2022    RDW 13.1 01/05/2022     BMP:   Lab Results   Component Value Date     01/05/2022    K 4.4 01/05/2022     01/05/2022    CO2 23 01/05/2022    BUN 17 01/05/2022    CREATININE 0.6 01/05/2022     (H) 01/05/2022    CALCIUM 9.4 01/05/2022     LFTS;   Lab Results   Component Value Date    PROT 5.9 (L) 01/05/2022    ALBUMIN 3.3 (L) 01/05/2022    BILITOT 0.4 01/05/2022    AST 37 01/05/2022    ALKPHOS 66 01/05/2022    ALT 39 01/05/2022     COAGS: No results found for: INR, PROTIME, PTT  FLP:   Lab Results   Component Value Date    CHOL 131 01/05/2022    HDL 52 01/05/2022    LDLCALC 65.2 01/05/2022    TRIG 69 01/05/2022    CHOLHDL 39.7 01/05/2022     Component      Latest Ref Rng & Units 1/5/2022 9/22/2021 6/10/2021 2/26/2018   Hemoglobin A1C External      4.0 - 5.6 % 10.1  (H) 10.7 (H) >14.0 (H) 11.3 (H)   Estimated Avg Glucose      68 - 131 mg/dL 243 (H) 260 (H) Unable to calculate 278 (H)   Vitamin B-12      210 - 950 pg/mL   >2000 (H)    TSH      0.400 - 4.000 uIU/mL 1.491      CPK      20 - 200 U/L 474 (H)        Component      Latest Ref Rng & Units 5/19/2016   Hemoglobin A1C External      4.0 - 5.6 % 9.6 (H)   Estimated Avg Glucose      68 - 131 mg/dL 229 (H)   Vitamin B-12      210 - 950 pg/mL    TSH      0.400 - 4.000 uIU/mL    CPK      20 - 200 U/L        ASSESSMENT & PLAN:  Mikey Ramirez is a 66 y.o.  male seen in follow up for diabetic amyotrophy.     Problem List Items Addressed This Visit        Neuro    Diabetic amyotrophy - Primary    Current Assessment & Plan     Reviewed course of expected recovery  Continue PT   Safety precautions reviewed  Gabapentin helping with painful symptoms  Working with PCP to improve diabetic control              Endocrine    Type 2 diabetes mellitus with diabetic polyneuropathy, with long-term current use of insulin    Current Assessment & Plan     Last A1C 10.1 from > 14  Working with PCP for glycemic mgmt               Orthopedic    Leg weakness, bilateral       Other    Gait difficulty    Elevated CK    Current Assessment & Plan     Trend                  Follow up: 4 months with Dr. Frank / destiney TOLENTINO    I spent a total of 35 minutes on the day of the visit.    This includes face to face time with the patient, as well as non-face to face time preparing for and completing the visit (review of prior diagnostic testing and clinical notes, obtaining or reviewing history, documenting clinical information in the EMR, independently interpreting and communicating results to the patient/family and coordinating ongoing care).               I appreciate the opportunity to participate in the care of this patient. Please feel free to contact me with any questions or concerns.       Caity Hammer, [facility]  [facility]  [address]  [facility] [address]  [place] [address]

## 2022-05-16 NOTE — PATIENT INSTRUCTIONS
Continue the physical therapy as you are doing.     Continue the same gabapentin for the pain    Will add updated CK level given that it was previously elevated to ensure proper downward trend.     We will see you back in about 4 months with Dr. Frank, but please call or message us sooner for any issues at all!!    Continue working with Dr. Gallardo to manage the diabetes.

## 2022-05-17 ENCOUNTER — CLINICAL SUPPORT (OUTPATIENT)
Dept: REHABILITATION | Facility: HOSPITAL | Age: 67
End: 2022-05-17
Attending: PSYCHIATRY & NEUROLOGY
Payer: OTHER GOVERNMENT

## 2022-05-17 DIAGNOSIS — R26.9 GAIT DIFFICULTY: ICD-10-CM

## 2022-05-17 DIAGNOSIS — R29.898 LEG WEAKNESS, BILATERAL: Primary | ICD-10-CM

## 2022-05-17 PROBLEM — R74.8 ELEVATED CK: Status: ACTIVE | Noted: 2022-05-17

## 2022-05-17 PROBLEM — E11.44 DIABETIC AMYOTROPHY: Status: ACTIVE | Noted: 2022-05-17

## 2022-05-17 PROCEDURE — 97110 THERAPEUTIC EXERCISES: CPT | Mod: PO

## 2022-05-17 NOTE — ASSESSMENT & PLAN NOTE
Reviewed course of expected recovery  Continue PT   Safety precautions reviewed  Gabapentin helping with painful symptoms  Working with PCP to improve diabetic control

## 2022-05-17 NOTE — PLAN OF CARE
[facility]  Physical Therapy reassessment     Name: Mikey Ramirez  Clinic Number: [id number]    Therapy Diagnosis:   Encounter Diagnoses   Name Primary?    Leg weakness, bilateral Yes    Gait difficulty      Physician: Eloisa Frank,*    Physician Orders: PT Eval and Treat   Medical Diagnosis from Referral: E11.44 (ICD-10-CM) - Diabetic amyotrophy associated with type 2 diabetes mellitus     Evaluation Date: 5/17/2022  Authorization Period Expiration: 7/16/22  Plan of Care Expiration: 5/28/22  Visit # / Visits authorized: 1/ 1    Time In: 1645  Time Out: 1745  Total Billable Time: 55 minutes    Precautions: Standard, Diabetes and Fall    Subjective   Date of onset: : a couple of years  History of current condition - Mikey reports: over the past 2 years he has had progressive LE weakness and loss of balance . His right side LE is stronger than left. He hasn't had a lot of difficulty  with his UE. He needs to use a walking stick to help steady him but can ambulate w/o it. He has trouble walking after initially standing up . He has some pain with any pressure to his thighs and or buttocks    5/17/22: patient reports; he continues to have difficulty with walking at work due to distance. He though it would be getting easier with his workouts. He has some soreness for about a day after his visits. He mainly notices it in his quads.        Past Medical History:   Diagnosis Date    GERD (gastroesophageal reflux disease)     High cholesterol     Hypertension     Type 2 diabetes mellitus     LBSL 140 today --- running high     Mikey Ramirez  has a past surgical history that includes YAG CAP (Bilateral, 2012) and Cataract extraction (Bilateral, 2003).    Mikey has a current medication list which includes the following prescription(s): aspirin, blood sugar diagnostic, blood-glucose meter, dexcom g6 , freestyle dolores 14 day reader, freestyle dolores 14 day sensor, gabapentin, lantus solostar  u-100 insulin, insulin lispro, lancets, lisinopril, lutein, pen needle, diabetic, rosuvastatin, and semaglutide.    Review of patient's allergies indicates:   Allergen Reactions    No known allergies         Imaging, none:   EMG:Electrodiagnostic Impression:  1. 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  2. Needle EMG sampling of the bilateral lower extremities revealed abnormal findings in all muscles sampled.  Abnormal spontaneous activity was observed in all muscles.  Considering the diffuse nature of abnormalities, a diagnosis for a definitive neuro pathologic process cannot be made at this time.  Potential etiologies include motor neuron disease, bilateral lumbosacral plexopathy (including diabetic amyotrophy), and/or a combination of multilevel lumbosacral radiculopathy and severe peripheral polyneuropathy.  While motor unit analysis revealed findings more suggestive of a neuropathic type process, a myopathic process is also possibility, although lower on the differential. Correlation required to dictate further testing.      Prior Therapy: none  Social History:  lives with their spouse  Occupation:   Prior Level of Function: Independent   Current Level of Function: Modified Independent     Pain:  Current 2/10, worst 6/10, best 2/10   Location: bilateral Glute   Description: Aching and Dull  Aggravating Factors: Sitting and pressure on thigh/buttocks  Easing Factors: changing position    Pts goals: increase his strength and balance so he can do more functionally     Objective   Mental status: oriented x3  Posture/ Alignment: Fair    GAIT DEVIATIONS: Mikey amb with decreased velocity of limb motion, decreased step length, decreased stride length, increased stride width and decreased weight-shifting ability.with walking stick and decreased balance, steppage type gait    Decreased standing balance with  narrow stance with increased sway with Eyes open and closed, unable to  staggered stance    30 sec sit to stand test: 6  TUG: not tested    Strength   Right LE Left LE   Hip flexion 3/5 3/5   Hip extension      Hip abduction 4-/5 4-/5   Hip adduction 4/5 4/5   Knee flexion 4+/5 4+/5   Knee extension 4/5 4/5        Peroneals 4/5 4-/5   Tibialis anterior 4+/5 4/5   Tibialis posterior 4+/5 4/5   Gatroc/soleus 4+/5 4+/5       ROM: shoulder    AROM Right Left Comment   Shoulder Flexion: WNL WNL    Shoulder Abduction: WNL WNL    Shoulder ER: WNL WNL    Shoulder Ir:  WNL WNL    *pain    Strength:      Right Left Comment   Shoulder flexion: 5/5 5/5    Shoulder Abduction: 5/5 5/5    Shoulder ER: 4-/5 4-/5    Shoulder IR: 5/5 5/5    Lower Trap: 4/5 4/5    Middle Trap: 4/5 4/5                  Reflexes  Patellar: right/left  0/trace  Achilles: right/left:0/0      Palpation: + TTP quads      .        Treatment Time In: 1650  Treatment Time Out: 1545  Total Treatment time separate from Evaluation: 55 minutes      Mikey received therapeutic exercises to develop strength, endurance, ROM, flexibility, core stabilization and Balance for 45 minutes including:    Stat bike x  8min  Mini Squats 15x  Leg press: 20lbs x 15  6 inch step ups at stair case x 10 each leg  Heel raise 15x  Sit to stand with only 1 arm : 2 pads in sit x 10  Standing Hip ABD x 10  Side lying hip abd x 10 each with Assistance  HS Curl 15x:   Hip ext 15x  LAQ with 3 sec hold 15x  SLR with QS 15x: not performed  Bridging 15x  Hip ADD with ball 3 sec hold 15x: not performed  Clamshell 15x            Mikey participated in neuromuscular re-education activities to improve: Balance, Coordination, Kinesthetic, Sense and Proprioception for 10 minutes. The following activities were included:  NBOS 3 x 30 sec on foam   NBOS: with pertubations: 2 x 30 sec NP  NBOS looking right/left: 3 x 30 sec  Walking holding on with right hand in parallel bars looking  right/left  Staggered stance looking right and left            CMS Impairment/Limitation/Restriction for FOTO LE Survey    Therapist reviewed FOTO scores for Mikey Ramirez on 5/17/2022.   FOTO documents entered into [facility] [employer] [facility] - see Media section.    Limitation Score: not collected           Home Exercises and Patient Education Provided    Education provided:   - progression of exercises  - may be a little sore    Written Home Exercises Provided: Plan to give on 2nd-3rd visit after we see how he does with exercises.  Exercises were reviewed and Mikey was able to demonstrate them prior to the end of the session.  Mikey demonstrated good  understanding of the education provided.     See EMR under N/A for exercises provided N/A  Pt/family was provided educational information, including: role of PT, goals for PT, scheduling - pt verbalized understanding. Discussed insurance plan with pt    Assessment   Pt did well with his exercises in clinic. I think he continues to have difficulty with walking long distance at work due to still recovering from his workout the prior day. He is moving better overall . He still has significant weakness in his legs.      Pt prognosis is Good.   Pt will benefit from skilled outpatient Physical Therapy to address the deficits stated above and in the chart below, provide pt/family education, and to maximize pt's level of independence.     Plan of care discussed with patient: Yes  Pt's spiritual, cultural and educational needs considered and patient is agreeable to the plan of care and goals as stated below:     Anticipated Barriers for therapy: none    Goals:  Short Term Goals: 3-4 weeks   Increase hip flexion  strength by 1/2 grade, met  Increase hip Abd strength by 1/2 grade, progressing, not met  Increase left ankle EV/IV by 1/2 grade, progressing, not met  Increase sit to  30 sec by 4 or more reps, progressing, not met  Decrease TUG by 2 secs or more, progressing, not met    Long Term Goals:  8 weeks   Able to ambulate on tuff field w/o assisted device x 40 feet safely, progressing, not met  Increase sit to  30 sec to 13 or more, progressing, not met  Able to stand narrow stance w/o sway, progressing, not met  Able to ambulate with walking stick x 400 feet w/o break, progressing, not met  Increase LE strength to 4+/5 grossly, progresing, not met      Plan   Plan of care Certification: 5/17/2022 to 6/10/22.    Outpatient Physical Therapy 2 times weekly for 10 weeks to include the following interventions: Gait Training, Manual Therapy, Neuromuscular Re-ed, Patient Education, Self Care, Therapeutic Activities and Therapeutic Exercise.     Sujit Urbina, PT

## 2022-05-19 ENCOUNTER — CLINICAL SUPPORT (OUTPATIENT)
Dept: REHABILITATION | Facility: HOSPITAL | Age: 67
End: 2022-05-19
Attending: PSYCHIATRY & NEUROLOGY
Payer: OTHER GOVERNMENT

## 2022-05-19 DIAGNOSIS — R29.898 LEG WEAKNESS, BILATERAL: Primary | ICD-10-CM

## 2022-05-19 DIAGNOSIS — R26.9 GAIT DIFFICULTY: ICD-10-CM

## 2022-05-19 PROCEDURE — 97112 NEUROMUSCULAR REEDUCATION: CPT | Mod: PO

## 2022-05-19 PROCEDURE — 97110 THERAPEUTIC EXERCISES: CPT | Mod: PO

## 2022-05-19 NOTE — PROGRESS NOTES
Physical Therapy Daily Treatment Note     Name: Mikey [facility] Number: [id number]    Therapy Diagnosis:   No diagnosis found.  Physician: Eloisa Frank,*    Visit Date: 5/19/2022   Physician Orders: PT Eval and Treat   Medical Diagnosis from Referral: E11.44 (ICD-10-CM) - Diabetic amyotrophy associated with type 2 diabetes mellitus      Evaluation Date: 3/28/2022  Authorization Period Expiration: 7/16/22  Plan of Care Expiration: 5/28/22  Visit # / Visits authorized:7/ 15     Time In:4:45  Time Out: 5:30  Total Billable Time: 45 minutes     Precautions: Standard, Diabetes and Fall      Subjective     Pt reports: that he is having some pn in R thigh and ankle, reports that he did well after last tx.  .     He was not issued home exercise program.  Response to previous treatment:mild  soreness  Functional change: none to date    Pain: 4/10  Location: R thigh and ankle      Objective     Mikey received therapeutic exercises to develop strength, endurance, ROM, flexibility, core stabilization and Balance for 35 minutes including:     Stat bike x  8min  Mini Squats 15x  Leg press: 20lbs x 15  6 inch step ups at stair case x 10 each leg  Heel raise 15x  Sit to stand with only 1 arm : 2 pads in sit x 10  Standing Hip ABD x 15    Hook lying hip Add; with ring x 15  Side lying hip abd x 10 each with Assistance  Standing HS Curl 15x:   Hip ext 15x  LAQ with 3 sec hold 15x, 2lbs  SLR with QS 15x: not performed  Bridging 15x  Clamshell 15x ; not performed           Mikey participated in neuromuscular re-education activities to improve: Balance, Coordination, Kinesthetic, Sense and Proprioception for 10 minutes. The following activities were included:  NBOS 3 x 30 sec on foam   NBOS: with pertubations: 2 x 30 sec NP  NBOS looking right/left: 3 x 30 sec  Walking holding on with right hand in parallel bars looking right/left  Staggered stance looking right and left            Home Exercises Provided and Patient Education  Provided     Education provided:   - effects of therapy    Written Home Exercises Provided: Patient instructed to cont prior HEP.  Exercises were reviewed and Mikey was able to demonstrate them prior to the end of the session.  Mikey demonstrated good  understanding of the education provided.     See EMR under Patient Instructions for exercises provided prior visit.    Assessment     Mikey had some increased soreness with his exercises earlier in the week as we added to his program so I changed up some of his exercises for today. He did well and is demonstrating improvements in strength and balance but still having significant weakness in his legs with functional task like sit to stand from a chair he can't do without assistance from walking stick and other hand     Mikey Is progressing well towards his goals.   Pt prognosis is Good.     Pt will continue to benefit from skilled outpatient physical therapy to address the deficits listed in the problem list box on initial evaluation, provide pt/family education and to maximize pt's level of independence in the home and community environment.     Pt's spiritual, cultural and educational needs considered and pt agreeable to plan of care and goals.    Anticipated barriers to physical therapy: none    Goals:   Short Term Goals: 3-4 weeks   Increase hip flexion  strength by 1/2 grade, progressing, not met  Increase hip Abd strength by 1/2 grade, progressing, not met  Increase left ankle EV/IV by 1/2 grade, progressing, not met  Increase sit to  30 sec by 4 or more reps, progressing, not met  Decrease TUG by 2 secs or more, progressing, not met     Long Term Goals: 8 weeks   Able to ambulate on [employer] field w/o assisted device x 40 feet safely, progressing, not met  Increase sit to  30 sec to 13 or more, progressing, not met  Able to stand narrow stance w/o sway, progressing, not met  Able to ambulate with walking stick x 400 feet w/o break, progressing, not  met  Increase LE strength to 4+/5 grossly, progressing, not met      Plan     Plan of care Certification: 3/28/2022 to 6/10/22.     Outpatient Physical Therapy 2 times weekly for 10 weeks to include the following interventions: Gait Training, Manual Therapy, Neuromuscular Re-ed, Patient Education, Self Care, Therapeutic Activities and Therapeutic Exercise      Sujit Urbina, PT

## 2022-05-24 ENCOUNTER — TELEPHONE (OUTPATIENT)
Dept: ADMINISTRATIVE | Facility: HOSPITAL | Age: 67
End: 2022-05-24
Payer: OTHER GOVERNMENT

## 2022-05-25 ENCOUNTER — CLINICAL SUPPORT (OUTPATIENT)
Dept: REHABILITATION | Facility: HOSPITAL | Age: 67
End: 2022-05-25
Attending: PSYCHIATRY & NEUROLOGY
Payer: OTHER GOVERNMENT

## 2022-05-25 DIAGNOSIS — R29.898 LEG WEAKNESS, BILATERAL: Primary | ICD-10-CM

## 2022-05-25 DIAGNOSIS — R26.9 GAIT DIFFICULTY: ICD-10-CM

## 2022-05-25 PROCEDURE — 97112 NEUROMUSCULAR REEDUCATION: CPT | Mod: PO,CQ

## 2022-05-25 PROCEDURE — 97110 THERAPEUTIC EXERCISES: CPT | Mod: PO,CQ

## 2022-05-25 NOTE — PROGRESS NOTES
Physical Therapy Daily Treatment Note     Name: Mikey [facility]] Number: [id number]    Therapy Diagnosis:   Encounter Diagnoses   Name Primary?    Leg weakness, bilateral Yes    Gait difficulty      Physician: Eloisa Frank,*    Visit Date: 5/25/2022   Physician Orders: PT Eval and Treat   Medical Diagnosis from Referral: E11.44 (ICD-10-CM) - Diabetic amyotrophy associated with type 2 diabetes mellitus      Evaluation Date: 3/28/2022  Authorization Period Expiration: 7/16/22  Plan of Care Expiration: 5/28/22  Visit # / Visits authorized:8/ 15     Time In:4:40  Time Out: 5:30  Total Billable Time: 45 minutes     Precautions: Standard, Diabetes and Fall      Subjective     Pt reports:that he feels that the exs are increasing his pn. He would like to slow his progression to build his adryan to ex.  .     He was not issued home exercise program.  Response to previous treatment:mild  soreness  Functional change: none to date    Pain: 5/10  Location: B LE      Objective     Mikey received therapeutic exercises to develop strength, endurance, ROM, flexibility, core stabilization and Balance for 35 minutes including:       Stat bike x  10 min  Mini Squats 15x NP  Leg press: 20lbs x 15  6 inch step ups at stair case x 10 each leg  Heel raise 15x  Standing Hip ABD x 10 NP  Side lying hip abd x 10 each with Assistance NP  HS Curl 20x:   Hip ext 20x  LAQ with 3 sec hold 15x  SLR with QS 15x: not performed  Bridging 20x  Hip ADD with ball 3 sec hold 20  Clamshell 15x             Mikey participated in neuromuscular re-education activities to improve: Balance, Coordination, Kinesthetic, Sense and Proprioception for 10 minutes. The following activities were included:  NBOS 3 x 30 sec on foam   NBOS: with pertubations: 2 x 30 sec NP  NBOS looking right/left: 3 x 30 sec  Walking holding on with right hand in parallel bars looking right/left  Staggered stance looking right and left            Home Exercises Provided and Patient  Education Provided     Education provided:   - effects of therapy    Written Home Exercises Provided: Patient instructed to cont prior HEP.  Exercises were reviewed and Mikey was able to demonstrate them prior to the end of the session.  Mikey demonstrated good  understanding of the education provided.     See EMR under Patient Instructions for exercises provided prior visit.    Assessment     Mikey adryan today's tx with ther ex, neuromuscular re-ed well. He did exhibit some fatigue at end of session. He continues to have some increased soreness with his exercises so we returned to previous level per his request. He is still having significant weakness in his legs with functional task like sit to stand from a chair he can't do without assistance from walking stick and other hand     Mikey Is progressing well towards his goals.   Pt prognosis is Good.     Pt will continue to benefit from skilled outpatient physical therapy to address the deficits listed in the problem list box on initial evaluation, provide pt/family education and to maximize pt's level of independence in the home and community environment.     Pt's spiritual, cultural and educational needs considered and pt agreeable to plan of care and goals.    Anticipated barriers to physical therapy: none    Goals:   Short Term Goals: 3-4 weeks   Increase hip flexion  strength by 1/2 grade, progressing, not met  Increase hip Abd strength by 1/2 grade, progressing, not met  Increase left ankle EV/IV by 1/2 grade, progressing, not met  Increase sit to  30 sec by 4 or more reps, progressing, not met  Decrease TUG by 2 secs or more, progressing, not met     Long Term Goals: 8 weeks   Able to ambulate on [employer] field w/o assisted device x 40 feet safely, progressing, not met  Increase sit to  30 sec to 13 or more, progressing, not met  Able to stand narrow stance w/o sway, progressing, not met  Able to ambulate with walking stick x 400 feet w/o break,  progressing, not met  Increase LE strength to 4+/5 grossly, progressing, not met      Plan     Plan of care Certification: 3/28/2022 to 6/10/22.     Outpatient Physical Therapy 2 times weekly for 10 weeks to include the following interventions: Gait Training, Manual Therapy, Neuromuscular Re-ed, Patient Education, Self Care, Therapeutic Activities and Therapeutic Exercise      Steve Coy, PTA

## 2022-05-31 ENCOUNTER — PATIENT MESSAGE (OUTPATIENT)
Dept: ADMINISTRATIVE | Facility: HOSPITAL | Age: 67
End: 2022-05-31
Payer: OTHER GOVERNMENT

## 2022-05-31 ENCOUNTER — CLINICAL SUPPORT (OUTPATIENT)
Dept: REHABILITATION | Facility: HOSPITAL | Age: 67
End: 2022-05-31
Attending: PSYCHIATRY & NEUROLOGY
Payer: OTHER GOVERNMENT

## 2022-05-31 DIAGNOSIS — R26.9 GAIT DIFFICULTY: ICD-10-CM

## 2022-05-31 DIAGNOSIS — R29.898 LEG WEAKNESS, BILATERAL: Primary | ICD-10-CM

## 2022-05-31 PROCEDURE — 97112 NEUROMUSCULAR REEDUCATION: CPT | Mod: PO | Performed by: PHYSICAL THERAPIST

## 2022-05-31 PROCEDURE — 97110 THERAPEUTIC EXERCISES: CPT | Mod: PO | Performed by: PHYSICAL THERAPIST

## 2022-05-31 NOTE — PROGRESS NOTES
"  Physical Therapy Daily Treatment Note     Name: Mikey [facility] Number: [id number]    Therapy Diagnosis:   Encounter Diagnoses   Name Primary?    Leg weakness, bilateral Yes    Gait difficulty      Physician: Eloisa Frank,*    Visit Date: 5/31/2022   Physician Orders: PT Eval and Treat   Medical Diagnosis from Referral: E11.44 (ICD-10-CM) - Diabetic amyotrophy associated with type 2 diabetes mellitus      Evaluation Date: 3/28/2022  Authorization Period Expiration: 7/16/22  Plan of Care Expiration: 5/28/22  Visit # / Visits authorized:8/ 15     Time In:4:45  Time Out: 5:29  Total Billable Time: 45 minutes     Precautions: Standard, Diabetes and Fall      Subjective     Pt reports:that he is tired from working today.     He was not issued home exercise program.  Response to previous treatment:mild  soreness  Functional change: none to date    Pain: 3/10  Location: B LE      Objective     Mikey received therapeutic exercises to develop strength, endurance, ROM, flexibility, core stabilization and Balance for 34 minutes including:       Stat bike x  10 min  Mini Squats 15x NP  Leg press: 20lbs x 20  6 inch step ups at stair case x 10 each leg  Heel raise 15x  Standing Hip ABD x 10 NP  Side lying hip abd x 10 each with Assistance NP  HS Curl 20x:   Hip ext 20x  LAQ with 3 sec hold 15x  SLR with QS 15x: not performed  Bridging 20x - NP   Hip ADD with ball 3 sec hold 20 - NP   Clamshell 15x - NP             Mikey participated in neuromuscular re-education activities to improve: Balance, Coordination, Kinesthetic, Sense and Proprioception for 10 minutes. The following activities were included:  NBOS 3 x 30 sec on foam   NBOS: with pertubations: 2 x 30 sec NP  NBOS looking right/left: 3 x 30 sec  Walking holding on with right hand in parallel bars looking right/left 3x  Staggered stance looking right and left 2 x 30" ea             Home Exercises Provided and Patient Education Provided     Education provided:   - " effects of therapy    Written Home Exercises Provided: Patient instructed to cont prior HEP.  Exercises were reviewed and Mikey was able to demonstrate them prior to the end of the session.  Mikey demonstrated good  understanding of the education provided.     See EMR under Patient Instructions for exercises provided prior visit.    Assessment     Mikey with moderate sway during balance activities. He will benefit from continued focus on strengthening and balance.      Mikey Is progressing well towards his goals.   Pt prognosis is Good.     Pt will continue to benefit from skilled outpatient physical therapy to address the deficits listed in the problem list box on initial evaluation, provide pt/family education and to maximize pt's level of independence in the home and community environment.     Pt's spiritual, cultural and educational needs considered and pt agreeable to plan of care and goals.    Anticipated barriers to physical therapy: none    Goals:   Short Term Goals: 3-4 weeks   Increase hip flexion  strength by 1/2 grade, progressing, not met  Increase hip Abd strength by 1/2 grade, progressing, not met  Increase left ankle EV/IV by 1/2 grade, progressing, not met  Increase sit to  30 sec by 4 or more reps, progressing, not met  Decrease TUG by 2 secs or more, progressing, not met     Long Term Goals: 8 weeks   Able to ambulate on tuff field w/o assisted device x 40 feet safely, progressing, not met  Increase sit to  30 sec to 13 or more, progressing, not met  Able to stand narrow stance w/o sway, progressing, not met  Able to ambulate with walking stick x 400 feet w/o break, progressing, not met  Increase LE strength to 4+/5 grossly, progressing, not met      Plan     Plan of care Certification: 3/28/2022 to 6/10/22.     Outpatient Physical Therapy 2 times weekly for 10 weeks to include the following interventions: Gait Training, Manual Therapy, Neuromuscular Re-ed, Patient Education, Self Care,  Therapeutic Activities and Therapeutic Exercise      Yogesh Daniels, PT

## 2022-06-02 ENCOUNTER — CLINICAL SUPPORT (OUTPATIENT)
Dept: REHABILITATION | Facility: HOSPITAL | Age: 67
End: 2022-06-02
Attending: PSYCHIATRY & NEUROLOGY
Payer: OTHER GOVERNMENT

## 2022-06-02 DIAGNOSIS — R29.898 LEG WEAKNESS, BILATERAL: Primary | ICD-10-CM

## 2022-06-02 DIAGNOSIS — R26.9 GAIT DIFFICULTY: ICD-10-CM

## 2022-06-02 PROCEDURE — 97112 NEUROMUSCULAR REEDUCATION: CPT | Mod: PO | Performed by: PHYSICAL THERAPIST

## 2022-06-02 PROCEDURE — 97110 THERAPEUTIC EXERCISES: CPT | Mod: PO | Performed by: PHYSICAL THERAPIST

## 2022-06-02 NOTE — PROGRESS NOTES
"  Physical Therapy Daily Treatment Note     Name: Mikey [facility] Number: [id number]    Therapy Diagnosis:   Encounter Diagnoses   Name Primary?    Leg weakness, bilateral Yes    Gait difficulty      Physician: Eloisa Frank,*    Visit Date: 6/2/2022   Physician Orders: PT Eval and Treat   Medical Diagnosis from Referral: E11.44 (ICD-10-CM) - Diabetic amyotrophy associated with type 2 diabetes mellitus      Evaluation Date: 3/28/2022  Authorization Period Expiration: 7/16/22  Plan of Care Expiration: 5/28/22  Visit # / Visits authorized:9/ 15     Time In:4:57  Time Out: 5:47 PM  Total Billable Time: 50 minutes     Precautions: Standard, Diabetes and Fall      Subjective     Pt reports: That he is having some pain in his legs     He was not issued home exercise program.  Response to previous treatment:mild  soreness  Functional change: none to date    Pain: 3/10  Location: B LE      Objective     Mikey received therapeutic exercises to develop strength, endurance, ROM, flexibility, core stabilization and Balance for 40 minutes including:     Stat bike x  10 min  Mini Squats 15x NP  Leg press: 30lbs x 20  6 inch step ups at stair case x 10 each leg  Heel raise 15x  Standing Hip ABD x 10 NP  Side lying hip abd x 10 each with Assistance NP  HS Curl 20x:   Hip ext 20x  LAQ with 3 sec hold 20x  SLR with QS 10x: not performed  Bridging 20x   Hip ADD with ball 3 sec hold 20 - NP   Clamshell 15x - NP             Mikey participated in neuromuscular re-education activities to improve: Balance, Coordination, Kinesthetic, Sense and Proprioception for 10 minutes. The following activities were included:  NBOS 3 x 30 sec on foam   NBOS: with pertubations: 2 x 30 sec NP  NBOS looking right/left: 3 x 30 sec  Walking holding on with right hand in parallel bars looking right/left 3x  Staggered stance looking right and left 2 x 30" ea             Home Exercises Provided and Patient Education Provided     Education provided:   - " effects of therapy    Written Home Exercises Provided: Patient instructed to cont prior HEP.  Exercises were reviewed and Mikey was able to demonstrate them prior to the end of the session.  Mikey demonstrated good  understanding of the education provided.     See EMR under Patient Instructions for exercises provided prior visit.    Assessment     Mikey with significant sway during balance activities on pliable surfaces. Significant difficulty with SLR exercises.      Mikey Is progressing well towards his goals.   Pt prognosis is Good.     Pt will continue to benefit from skilled outpatient physical therapy to address the deficits listed in the problem list box on initial evaluation, provide pt/family education and to maximize pt's level of independence in the home and community environment.     Pt's spiritual, cultural and educational needs considered and pt agreeable to plan of care and goals.    Anticipated barriers to physical therapy: none    Goals:   Short Term Goals: 3-4 weeks   Increase hip flexion  strength by 1/2 grade, progressing, not met  Increase hip Abd strength by 1/2 grade, progressing, not met  Increase left ankle EV/IV by 1/2 grade, progressing, not met  Increase sit to  30 sec by 4 or more reps, progressing, not met  Decrease TUG by 2 secs or more, progressing, not met     Long Term Goals: 8 weeks   Able to ambulate on tuff field w/o assisted device x 40 feet safely, progressing, not met  Increase sit to  30 sec to 13 or more, progressing, not met  Able to stand narrow stance w/o sway, progressing, not met  Able to ambulate with walking stick x 400 feet w/o break, progressing, not met  Increase LE strength to 4+/5 grossly, progressing, not met      Plan     Plan of care Certification: 3/28/2022 to 6/10/22.     Outpatient Physical Therapy 2 times weekly for 10 weeks to include the following interventions: Gait Training, Manual Therapy, Neuromuscular Re-ed, Patient Education, Self Care,  Therapeutic Activities and Therapeutic Exercise      Yogesh Daniels, PT

## 2022-06-07 ENCOUNTER — CLINICAL SUPPORT (OUTPATIENT)
Dept: REHABILITATION | Facility: HOSPITAL | Age: 67
End: 2022-06-07
Attending: PSYCHIATRY & NEUROLOGY
Payer: OTHER GOVERNMENT

## 2022-06-07 DIAGNOSIS — R29.898 LEG WEAKNESS, BILATERAL: Primary | ICD-10-CM

## 2022-06-07 DIAGNOSIS — R26.9 GAIT DIFFICULTY: ICD-10-CM

## 2022-06-07 PROCEDURE — 97110 THERAPEUTIC EXERCISES: CPT | Mod: PO

## 2022-06-07 NOTE — PROGRESS NOTES
"  Physical Therapy Daily Treatment Note     Name: Mikey [facility] Number: [id number]    Therapy Diagnosis:   Encounter Diagnoses   Name Primary?    Leg weakness, bilateral Yes    Gait difficulty      Physician: Eloisa Frank,*    Visit Date: 6/7/2022   Physician Orders: PT Eval and Treat   Medical Diagnosis from Referral: E11.44 (ICD-10-CM) - Diabetic amyotrophy associated with type 2 diabetes mellitus      Evaluation Date: 3/28/2022  Authorization Period Expiration: 7/16/22  Plan of Care Expiration: 5/28/22  Visit # / Visits authorized:9/ 15     Time In:1445  Time Out: 1515  Total Billable Time: 45 minutes     Precautions: Standard, Diabetes and Fall      Subjective     Pt reports: he had some mild soreness after last visit but not bad. He still isn't noticing a significant change in his strength with ADLs.     He was not issued home exercise program.  Response to previous treatment:mild  soreness  Functional change: none to date    Pain: 3/10  Location: B LE      Objective     Mikey received therapeutic exercises to develop strength, endurance, ROM, flexibility, core stabilization and Balance for 40 minutes including:     Stat bike x  10 min  Mini Squats 15x NP  Leg press: 30lbs x 10, 40lbs x 10  6 inch step ups at stair case x 10 each leg  Heel raise 15x  Standing Hip ABD x 10 NP  Side lying hip abd x 10 each with Assistance  HS Curl 20x:   Hip ext 20x: not performed  LAQ with 3 sec hold 20x  SLR with QS 10x: not performed  Bridging 20x   Hip ADD with ball 3 sec hold 20 - NP   Clamshell 15x      Mikey participated in neuromuscular re-education activities to improve: Balance, Coordination, Kinesthetic, Sense and Proprioception for 5 minutes. The following activities were included:  NBOS 3 x 30 sec on foam   NBOS: with pertubations: 2 x 30 sec NP  NBOS looking right/left: 3 x 30 sec  Walking holding on with right hand in parallel bars looking right/left 3x  Staggered stance looking right and left 2 x 30" ea "       Home Exercises Provided and Patient Education Provided     Education provided:   - effects of therapy  - progression of strngth    Written Home Exercises Provided: Patient instructed to cont prior HEP.  Exercises were reviewed and Mikey was able to demonstrate them prior to the end of the session.  Mikey demonstrated good  understanding of the education provided.     See EMR under Patient Instructions for exercises provided prior visit.    Assessment     Mikey continues to have weakness in his hips the most along with LE coordination/balance deficits. He has made progress with his strength and endurance but it is difficult for him to see the gain with his ADLs yet. I am hopeful that he should start to notice more gains over the next several weeks.      Mikey Is progressing well towards his goals.   Pt prognosis is Good.     Pt will continue to benefit from skilled outpatient physical therapy to address the deficits listed in the problem list box on initial evaluation, provide pt/family education and to maximize pt's level of independence in the home and community environment.     Pt's spiritual, cultural and educational needs considered and pt agreeable to plan of care and goals.    Anticipated barriers to physical therapy: none    Goals:   Short Term Goals: 3-4 weeks   Increase hip flexion  strength by 1/2 grade, progressing, not met  Increase hip Abd strength by 1/2 grade, progressing, not met  Increase left ankle EV/IV by 1/2 grade, progressing, not met  Increase sit to  30 sec by 4 or more reps, progressing, not met  Decrease TUG by 2 secs or more, progressing, not met     Long Term Goals: 8 weeks   Able to ambulate on [place] field w/o assisted device x 40 feet safely, progressing, not met  Increase sit to  30 sec to 13 or more, progressing, not met  Able to stand narrow stance w/o sway, progressing, not met  Able to ambulate with walking stick x 400 feet w/o break, progressing, not met  Increase LE  strength to 4+/5 grossly, progressing, not met      Plan     Plan of care Certification: 3/28/2022 to 6/10/22.     Outpatient Physical Therapy 2 times weekly for 10 weeks to include the following interventions: Gait Training, Manual Therapy, Neuromuscular Re-ed, Patient Education, Self Care, Therapeutic Activities and Therapeutic Exercise      Sujit Urbina, PT

## 2022-06-08 ENCOUNTER — TELEPHONE (OUTPATIENT)
Dept: ADMINISTRATIVE | Facility: HOSPITAL | Age: 67
End: 2022-06-08
Payer: OTHER GOVERNMENT

## 2022-06-09 ENCOUNTER — CLINICAL SUPPORT (OUTPATIENT)
Dept: REHABILITATION | Facility: HOSPITAL | Age: 67
End: 2022-06-09
Attending: PSYCHIATRY & NEUROLOGY
Payer: OTHER GOVERNMENT

## 2022-06-09 DIAGNOSIS — R26.9 GAIT DIFFICULTY: ICD-10-CM

## 2022-06-09 DIAGNOSIS — R29.898 LEG WEAKNESS, BILATERAL: Primary | ICD-10-CM

## 2022-06-09 PROCEDURE — 97110 THERAPEUTIC EXERCISES: CPT | Mod: PO

## 2022-06-09 NOTE — PROGRESS NOTES
Physical Therapy Daily Treatment Note     Name: Mikey [facility] Number: [id number]    Therapy Diagnosis:   Encounter Diagnoses   Name Primary?    Leg weakness, bilateral Yes    Gait difficulty      Physician: Eloisa Frank,*    Visit Date: 6/9/2022   Physician Orders: PT Eval and Treat   Medical Diagnosis from Referral: E11.44 (ICD-10-CM) - Diabetic amyotrophy associated with type 2 diabetes mellitus      Evaluation Date: 3/28/2022  Authorization Period Expiration: 7/16/22  Plan of Care Expiration: 5/28/22  Visit # / Visits authorized:9/ 15     Time In:1445  Time Out: 1515  Total Billable Time: 45 minutes     Precautions: Standard, Diabetes and Fall      Subjective     Pt reports:he is feeling pretty good today. A little tired as usual at the end of the day. He feels pretty good starting off the day and at work doesn't use his walking stick but needs it by the end of the day.     He was not issued home exercise program.  Response to previous treatment:mild  soreness  Functional change: none to date    Pain: 3/10  Location: B LE      Objective     Mikey received therapeutic exercises to develop strength, endurance, ROM, flexibility, core stabilization and Balance for 40 minutes including:     Stat bike x  10 min  Mini Squats 15x NP  Leg press: 30lbs x 10, 40lbs x 10  6 inch step ups at stair case x 10 each leg  Heel raise 15x  Standing Hip ABD x 10 NP  Side lying hip abd x 10 each with Assistance  HS Curl 20x:   Hip ext 20x: not performed  LAQ with 3 sec hold 20x  SLR with QS 10x: not performed  Bridging 20x   Hip ADD with ball 3 sec hold 20 - NP   Clamshell 15x      Mikey participated in neuromuscular re-education activities to improve: Balance, Coordination, Kinesthetic, Sense and Proprioception for 5 minutes. The following activities were included:  NBOS 3 x 30 sec on foam   NBOS: with pertubations: 2 x 30 sec NP  NBOS looking right/left: 3 x 30 sec  Walking holding on with right hand in parallel bars  "looking right/left 3x  Staggered stance looking right and left 2 x 30" ea       Home Exercises Provided and Patient Education Provided     Education provided:   - effects of therapy  - progression of strngth    Written Home Exercises Provided: Patient instructed to cont prior HEP.  Exercises were reviewed and Mikey was able to demonstrate them prior to the end of the session.  Mikey demonstrated good  understanding of the education provided.     See EMR under Patient Instructions for exercises provided prior visit.    Assessment     Mikey did well with his exercises today. He continues to demonstrate decreased hip left greater than right weakness and quad weakness right greater than left. His balance has improved.      Mikey Is progressing well towards his goals.   Pt prognosis is Good.     Pt will continue to benefit from skilled outpatient physical therapy to address the deficits listed in the problem list box on initial evaluation, provide pt/family education and to maximize pt's level of independence in the home and community environment.     Pt's spiritual, cultural and educational needs considered and pt agreeable to plan of care and goals.    Anticipated barriers to physical therapy: none    Goals:   Short Term Goals: 3-4 weeks   Increase hip flexion  strength by 1/2 grade, progressing, not met  Increase hip Abd strength by 1/2 grade, progressing, not met  Increase left ankle EV/IV by 1/2 grade, progressing, not met  Increase sit to  30 sec by 4 or more reps, progressing, not met  Decrease TUG by 2 secs or more, progressing, not met     Long Term Goals: 8 weeks   Able to ambulate on tuff field w/o assisted device x 40 feet safely, progressing, not met  Increase sit to  30 sec to 13 or more, progressing, not met  Able to stand narrow stance w/o sway, progressing, not met  Able to ambulate with walking stick x 400 feet w/o break, progressing, not met  Increase LE strength to 4+/5 grossly, progressing, " not met      Plan     Plan of care Certification: 3/28/2022 to 6/10/22.     Outpatient Physical Therapy 2 times weekly for 10 weeks to include the following interventions: Gait Training, Manual Therapy, Neuromuscular Re-ed, Patient Education, Self Care, Therapeutic Activities and Therapeutic Exercise      Sujit Urbina, PT

## 2022-06-13 ENCOUNTER — CLINICAL SUPPORT (OUTPATIENT)
Dept: REHABILITATION | Facility: HOSPITAL | Age: 67
End: 2022-06-13
Attending: PSYCHIATRY & NEUROLOGY
Payer: OTHER GOVERNMENT

## 2022-06-13 DIAGNOSIS — R29.898 LEG WEAKNESS, BILATERAL: Primary | ICD-10-CM

## 2022-06-13 DIAGNOSIS — R26.9 GAIT DIFFICULTY: ICD-10-CM

## 2022-06-13 PROCEDURE — 97110 THERAPEUTIC EXERCISES: CPT | Mod: PO

## 2022-06-13 NOTE — PROGRESS NOTES
Physical Therapy Daily Treatment Note     Name: Mikey [facility] Number: [id number]    Therapy Diagnosis:   Encounter Diagnoses   Name Primary?    Leg weakness, bilateral Yes    Gait difficulty      Physician: Eloisa Frank,*    Visit Date: 6/13/2022   Physician Orders: PT Eval and Treat   Medical Diagnosis from Referral: E11.44 (ICD-10-CM) - Diabetic amyotrophy associated with type 2 diabetes mellitus      Evaluation Date: 3/28/2022  Authorization Period Expiration: 7/16/22  Plan of Care Expiration: 6/17/22  Visit # / Visits authorized:15/ 15     Time In:1645  Time Out: 1730  Total Billable Time: 45 minutes     Precautions: Standard, Diabetes and Fall      Subjective     Pt reports:he felt tired with some mild soreness after last visit but not bad . He did have some increased LE pain on Sunday for some reason that may be related to his neuropathy.     He was not issued home exercise program.  Response to previous treatment:mild  soreness  Functional change: none to date    Pain: 3/10  Location: B LE      Objective     Mikey received therapeutic exercises to develop strength, endurance, ROM, flexibility, core stabilization and Balance for 40 minutes including:     Stat bike x  10 min  Mini Squats 15x NP  Leg press: 30lbs x 10, 40lbs x 10  6 inch step ups at stair case x 10 each leg  Heel raise 15x  Standing Hip ABD x 10 NP  Side lying hip abd x 10 each with Assistance  HS Curl 20x:   Hip ext 20x: not performed  LAQ with 3 sec hold 20x  SLR with QS 10x: not performed  Bridging 20x   Hip ADD(30lbs) /Abd machine:   Clamshell 15x      Mikey participated in neuromuscular re-education activities to improve: Balance, Coordination, Kinesthetic, Sense and Proprioception for 5 minutes. The following activities were included:  NBOS 3 x 30 sec on foam   NBOS: with pertubations: 2 x 30 sec NP  NBOS looking right/left: 3 x 30 sec  Walking holding on with right hand in parallel bars looking right/left 3x  Staggered stance  "looking right and left 2 x 30" ea       Home Exercises Provided and Patient Education Provided     Education provided:   - effects of therapy  - progression of strngth    Written Home Exercises Provided: Patient instructed to cont prior HEP.  Exercises were reviewed and Mikey was able to demonstrate them prior to the end of the session.  Mikey demonstrated good  understanding of the education provided.     See EMR under Patient Instructions for exercises provided prior visit.    Assessment     Mikey did well with his exercises today with improvement in balance and endurance. He still having difficulty with sit to stand from normal chair height.      Mikey Is progressing well towards his goals.   Pt prognosis is Good.     Pt will continue to benefit from skilled outpatient physical therapy to address the deficits listed in the problem list box on initial evaluation, provide pt/family education and to maximize pt's level of independence in the home and community environment.     Pt's spiritual, cultural and educational needs considered and pt agreeable to plan of care and goals.    Anticipated barriers to physical therapy: none    Goals:   Short Term Goals: 3-4 weeks   Increase hip flexion  strength by 1/2 grade, progressing, not met  Increase hip Abd strength by 1/2 grade, progressing, not met  Increase left ankle EV/IV by 1/2 grade, progressing, not met  Increase sit to  30 sec by 4 or more reps, progressing, not met  Decrease TUG by 2 secs or more, progressing, not met     Long Term Goals: 8 weeks   Able to ambulate on tuff field w/o assisted device x 40 feet safely, progressing, not met  Increase sit to  30 sec to 13 or more, progressing, not met  Able to stand narrow stance w/o sway, progressing, not met  Able to ambulate with walking stick x 400 feet w/o break, progressing, not met  Increase LE strength to 4+/5 grossly, progressing, not met      Plan     Plan of care Certification: 3/28/2022 to " 6/10/22.     Outpatient Physical Therapy 2 times weekly for 10 weeks to include the following interventions: Gait Training, Manual Therapy, Neuromuscular Re-ed, Patient Education, Self Care, Therapeutic Activities and Therapeutic Exercise      Sujit Urbina, PT

## 2022-06-21 ENCOUNTER — TELEPHONE (OUTPATIENT)
Dept: NEUROLOGY | Facility: CLINIC | Age: 67
End: 2022-06-21
Payer: OTHER GOVERNMENT

## 2022-06-21 NOTE — TELEPHONE ENCOUNTER
Received via fax from [facility] [employer]--- referral/authorization.  Authorized services; therapeutic procedure 19 units between dates: 3/18/2022-7/16/2022.    Physical therapy evaluation low complex 1 unit between dates: 3/1/2022-7/16/2022.  A copy sent to scanning

## 2022-06-22 ENCOUNTER — CLINICAL SUPPORT (OUTPATIENT)
Dept: REHABILITATION | Facility: HOSPITAL | Age: 67
End: 2022-06-22
Attending: PSYCHIATRY & NEUROLOGY
Payer: OTHER GOVERNMENT

## 2022-06-22 ENCOUNTER — DOCUMENTATION ONLY (OUTPATIENT)
Dept: REHABILITATION | Facility: HOSPITAL | Age: 67
End: 2022-06-22
Payer: OTHER GOVERNMENT

## 2022-06-22 DIAGNOSIS — R26.9 GAIT DIFFICULTY: ICD-10-CM

## 2022-06-22 DIAGNOSIS — R29.898 LEG WEAKNESS, BILATERAL: Primary | ICD-10-CM

## 2022-06-22 PROCEDURE — 97112 NEUROMUSCULAR REEDUCATION: CPT | Mod: PO,CQ

## 2022-06-22 PROCEDURE — 97110 THERAPEUTIC EXERCISES: CPT | Mod: PO,CQ

## 2022-06-22 NOTE — PLAN OF CARE
Due to patient's episode of care being interrupted by pending insurance authorization, his plan of care certification date will be extended through 6/30/22. Plan of care will be updated at that time.

## 2022-06-22 NOTE — PROGRESS NOTES
Physical Therapy Daily Treatment Note     Name: Mikey Ramirez  [facility] Number: [id number]    Therapy Diagnosis:   Encounter Diagnoses   Name Primary?    Leg weakness, bilateral Yes    Gait difficulty      Physician: Eloisa Frank,*    Visit Date: 6/22/2022   Physician Orders: PT Eval and Treat   Medical Diagnosis from Referral: E11.44 (ICD-10-CM) - Diabetic amyotrophy associated with type 2 diabetes mellitus      Evaluation Date: 3/28/2022  Authorization Period Expiration: 7/16/22  Plan of Care Expiration: 6/17/22  Visit # / Visits authorized:16/19     Time In:1645  Time Out: 1730  Total Billable Time: 45 minutes     Precautions: Standard, Diabetes and Fall      Subjective     Pt reports: no new complaints.  He still hasn't seen much consistency in his pain symptoms in that they come and go and vary in frequency.  Pt reports he tends to be most sore the 3rd day after treatment.  Pt reports he still doesn't have enough strength to get up and down out of a chair without his arms.  He was not issued home exercise program.  Response to previous treatment:mild  soreness  Functional change: none to date    Pain: 3/10  Location: B LE      Objective     Mikey received therapeutic exercises to develop strength, endurance, ROM, flexibility, core stabilization and Balance for 35 minutes including:     Recumbent bike seat 14  x  10 min  Mini Squats 15x NP  Leg press: 30lbs seat 12 x 10, 40lbs x 10, 50# x 10  6 inch step ups at stair case x 20 each leg  Heel raise 15x  Standing Hip ABD x 10 NP  Side lying hip abd x 10 each with Assistance- not performed  HS Curl 20x:   Hip ext 20x: not performed  LAQ with 3 sec hold 20x- not performed  SLR with QS 10x: not performed  Bridging 20x - not performed  Hip ADD(30lbs) /Abd machine: - not performed  Clamshell 15x - not performed    Mikey participated in neuromuscular re-education activities to improve: Balance, Coordination, Kinesthetic, Sense and Proprioception for 10 minutes. The  "following activities were included:  NBOS 3 x 30 sec on foam   NBOS: with pertubations: 2 x 30 sec NP  NBOS looking right/left on foam: 3 x 30 sec  Walking holding on with right hand in parallel bars looking right/left 3x  Staggered stance looking right and left 2 x 30" ea   Full tandem x 30 sec      Home Exercises Provided and Patient Education Provided     Education provided:   - effects of therapy  - progression of strength    Written Home Exercises Provided: Patient instructed to cont prior HEP.  Exercises were reviewed and Mikey was able to demonstrate them prior to the end of the session.  Mikey demonstrated good  understanding of the education provided.     See EMR under Patient Instructions for exercises provided prior visit.    Assessment     Mikey did well with his exercises today with improvement in balance and endurance. He still having difficulty with sit to stand from normal chair height. Pt was able to perform full tandem without UE support.  Pt still very challenged and requires increased time to perform exercises.  Will assess response and continue progressing within tolerance.     Mikey Is progressing well towards his goals.   Pt prognosis is Good.     Pt will continue to benefit from skilled outpatient physical therapy to address the deficits listed in the problem list box on initial evaluation, provide pt/family education and to maximize pt's level of independence in the home and community environment.     Pt's spiritual, cultural and educational needs considered and pt agreeable to plan of care and goals.    Anticipated barriers to physical therapy: none    Goals:   Short Term Goals: 3-4 weeks   Increase hip flexion  strength by 1/2 grade, progressing, not met  Increase hip Abd strength by 1/2 grade, progressing, not met  Increase left ankle EV/IV by 1/2 grade, progressing, not met  Increase sit to  30 sec by 4 or more reps, progressing, not met  Decrease TUG by 2 secs or more, progressing, not " met     Long Term Goals: 8 weeks   Able to ambulate on tuff field w/o assisted device x 40 feet safely, progressing, not met  Increase sit to  30 sec to 13 or more, progressing, not met  Able to stand narrow stance w/o sway, progressing, not met  Able to ambulate with walking stick x 400 feet w/o break, progressing, not met  Increase LE strength to 4+/5 grossly, progressing, not met      Plan     Plan of care Certification: 3/28/2022 to 6/10/22.     Outpatient Physical Therapy 2 times weekly for 10 weeks to include the following interventions: Gait Training, Manual Therapy, Neuromuscular Re-ed, Patient Education, Self Care, Therapeutic Activities and Therapeutic Exercise      Latoya Miller, PTA

## 2022-06-28 ENCOUNTER — CLINICAL SUPPORT (OUTPATIENT)
Dept: REHABILITATION | Facility: HOSPITAL | Age: 67
End: 2022-06-28
Attending: PSYCHIATRY & NEUROLOGY
Payer: OTHER GOVERNMENT

## 2022-06-28 DIAGNOSIS — R29.898 LEG WEAKNESS, BILATERAL: Primary | ICD-10-CM

## 2022-06-28 DIAGNOSIS — R26.9 GAIT DIFFICULTY: ICD-10-CM

## 2022-06-28 PROCEDURE — 97110 THERAPEUTIC EXERCISES: CPT | Mod: PO

## 2022-06-28 PROCEDURE — 97112 NEUROMUSCULAR REEDUCATION: CPT | Mod: PO

## 2022-06-28 NOTE — PROGRESS NOTES
Please see POC for reassessment       
This was a shared visit with the DELONTE. I reviewed and verified the documentation and independently performed the documented:

## 2022-06-28 NOTE — PLAN OF CARE
[facility]  Physical Therapy reassessment     Name: Mikey Ramirez  Clinic Number: [id number]    Therapy Diagnosis:   Encounter Diagnoses   Name Primary?    Leg weakness, bilateral Yes    Gait difficulty      Physician: Eloisa Frank,*    Physician Orders: PT Eval and Treat   Medical Diagnosis from Referral: E11.44 (ICD-10-CM) - Diabetic amyotrophy associated with type 2 diabetes mellitus     Evaluation Date: 6/28/2022  Authorization Period Expiration: 7/16/22  Plan of Care Expiration: 5/28/22  Visit # / Visits authorized: 1/ 1    Time In: 1645  Time Out: 1730  Total Billable Time: 40 minutes    Precautions: Standard, Diabetes and Fall    Subjective   Date of onset: : a couple of years  History of current condition - Mikey reports: over the past 2 years he has had progressive LE weakness and loss of balance . His right side LE is stronger than left. He hasn't had a lot of difficulty  with his UE. He needs to use a walking stick to help steady him but can ambulate w/o it. He has trouble walking after initially standing up . He has some pain with any pressure to his thighs and or buttocks    5/17/22: patient reports; he continues to have difficulty with walking at work due to distance. He though it would be getting easier with his workouts. He has some soreness for about a day after his visits. He mainly notices it in his quads.    6/28/22: Patient reports: He has been feeling good after last visit. He felt like he could move better. His weeks still seem to vary with days where he can walk better and feels stronger with less pain and others where it is more challenging and it doesn't seem to follow a pattern. Overall he does feel his balance has improved .         Past Medical History:   Diagnosis Date    GERD (gastroesophageal reflux disease)     High cholesterol     Hypertension     Type 2 diabetes mellitus     LBSL 140 today --- running high     Mikey Ramirez  has a past surgical  history that includes YAG CAP (Bilateral, 2012) and Cataract extraction (Bilateral, 2003).    Mikey has a current medication list which includes the following prescription(s): aspirin, blood sugar diagnostic, blood-glucose meter, dexcom g6 , freestyle dolores 14 day reader, freestyle dolores 14 day sensor, gabapentin, lantus solostar u-100 insulin, insulin lispro, lancets, lisinopril, lutein, pen needle, diabetic, rosuvastatin, and semaglutide.    Review of patient's allergies indicates:   Allergen Reactions    No known allergies         Imaging, none:   EMG:Electrodiagnostic Impression:  1. 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  2. Needle EMG sampling of the bilateral lower extremities revealed abnormal findings in all muscles sampled.  Abnormal spontaneous activity was observed in all muscles.  Considering the diffuse nature of abnormalities, a diagnosis for a definitive neuro pathologic process cannot be made at this time.  Potential etiologies include motor neuron disease, bilateral lumbosacral plexopathy (including diabetic amyotrophy), and/or a combination of multilevel lumbosacral radiculopathy and severe peripheral polyneuropathy.  While motor unit analysis revealed findings more suggestive of a neuropathic type process, a myopathic process is also possibility, although lower on the differential. Correlation required to dictate further testing.      Prior Therapy: none  Social History:  lives with their spouse  Occupation:   Prior Level of Function: Independent   Current Level of Function: Modified Independent     Pain:  Current 2/10, worst 6/10, best 2/10   Location: bilateral Glute   Description: Aching and Dull  Aggravating Factors: Sitting and pressure on thigh/buttocks  Easing Factors: changing position    Pts goals: increase his strength and balance so he can do more functionally      Objective   Mental status: oriented x3  Posture/ Alignment: Fair    GAIT DEVIATIONS: Mikey amb with decreased velocity of limb motion, decreased step length, decreased stride length, increased stride width and decreased weight-shifting ability.with walking stick and decreased balance, steppage type gait    Decreased standing balance with narrow stance with increased sway with Eyes open and closed, unable to  staggered stance    30 sec sit to stand test: 6  TUG: not tested    Strength   Right LE Left LE   Hip flexion 4/5 3/5   Hip extension      Hip abduction 4-/5 4-/5   Hip adduction 4/5 4/5   Knee flexion 4+/5 4+/5   Knee extension 4/5 4/5        Peroneals 4/5 4-/5   Tibialis anterior 4+/5 4/5   Tibialis posterior 4+/5 4/5   Gatroc/soleus 4+/5 4+/5       ROM: shoulder    AROM Right Left Comment   Shoulder Flexion: WNL WNL    Shoulder Abduction: WNL WNL    Shoulder ER: WNL WNL    Shoulder Ir:  WNL WNL    *pain    Strength:      Right Left Comment   Shoulder flexion: 5/5 5/5    Shoulder Abduction: 5/5 5/5    Shoulder ER: 4-/5 4-/5    Shoulder IR: 5/5 5/5    Lower Trap: 4/5 4/5    Middle Trap: 4/5 4/5            Palpation:decreased  + TTP quads       Treatment Time In: 1650  Treatment Time Out: 1730  Total Treatment time separate from Evaluation: 40 minutes      Mikey received therapeutic exercises to develop strength, endurance, ROM, flexibility, core stabilization and Balance for 30 minutes including:    Recumbent bike seat 14  x  10 min  Mini Squats 15x  Leg press:  seat 11, 40lbs x 10, 50# x 10  6 inch step ups at stair case x 20 each leg  Heel raise 15x  Standing Hip ABD x 10 NP  Side lying hip abd x 10 each with Assistance-   HS Curl 20x:   Hip ext 20x: not performed  LAQ with 3 sec hold 20x  SLR with QS 10x: not performed  Bridging 20x - not performed  Hip ADD(30lbs) /Abd machine: - not performed  Clamshell 15x - not performed     Mikey participated in neuromuscular re-education activities to improve:  "Balance, Coordination, Kinesthetic, Sense and Proprioception for 10 minutes. The following activities were included:  NBOS 3 x 30 sec on foam : not performed  NBOS: with pertubations: 2 x 30 sec NP  NBOS looking right/left on foam: 3 x 30 sec  Walking holding on with right hand in parallel bars looking right/left 3x  Staggered stance looking right and left 2 x 30" ea   Full tandem x 30 sec        CMS Impairment/Limitation/Restriction for FOTO LE Survey    Therapist reviewed FOTO scores for Mikey Ramirez on 6/28/2022.   FOTO documents entered into [employer] [facility] - see Media section.    Limitation Score: not collected           Home Exercises and Patient Education Provided    Education provided:   - progression of exercises  - may be a little sore    Written Home Exercises Provided: Plan to give on 2nd-3rd visit after we see how he does with exercises.  Exercises were reviewed and Mikey was able to demonstrate them prior to the end of the session.  Mikey demonstrated good  understanding of the education provided.     See EMR under N/A for exercises provided N/A  Pt/family was provided educational information, including: role of PT, goals for PT, scheduling - pt verbalized understanding. Discussed insurance plan with pt    Assessment   Pt overall is moving better and has had some small improvement with his strength. He does continue to have good days and some that are more challenging. I am going to start him on a limited HEP for 1 day a week as I still feel he needs some recovery time as he does feel it for a couple of days post exercise but will advance his HEP as appropriate. He still has signficant weaknes in his LE that affect his functional ability but is demonstrating improvement but needs continued care as he needs supervision for some exercises due to safety and others for assistance.      Pt prognosis is Good.   Pt will benefit from skilled outpatient Physical Therapy to address the deficits stated above and in the chart " below, provide pt/family education, and to maximize pt's level of independence.     Plan of care discussed with patient: Yes  Pt's spiritual, cultural and educational needs considered and patient is agreeable to the plan of care and goals as stated below:     Anticipated Barriers for therapy: none    Goals:  Short Term Goals: 3-4 weeks   Increase hip flexion  strength by 1/2 grade, met  Increase hip Abd strength by 1/2 grade, progressing, not met  Increase left ankle EV/IV by 1/2 grade, progressing, not met  Increase sit to  30 sec by 4 or more reps, progressing, not met  Decrease TUG by 2 secs or more, progressing, not met    Long Term Goals: 8 weeks   Able to ambulate on tuff field w/o assisted device x 40 feet safely, progressing, not met  Increase sit to  30 sec to 13 or more, progressing, not met  Able to stand narrow stance w/o sway, progressing, not met  Able to ambulate with walking stick x 400 feet w/o break, progressing, not met  Increase LE strength to 4+/5 grossly, progresing, not met      Plan   Plan of care Certification: 6/28/2022 to 8/25/22.    Outpatient Physical Therapy 2 times weekly for 10 weeks to include the following interventions: Gait Training, Manual Therapy, Neuromuscular Re-ed, Patient Education, Self Care, Therapeutic Activities and Therapeutic Exercise.     Sujit Urbina, PT

## 2022-06-30 ENCOUNTER — CLINICAL SUPPORT (OUTPATIENT)
Dept: REHABILITATION | Facility: HOSPITAL | Age: 67
End: 2022-06-30
Attending: PSYCHIATRY & NEUROLOGY
Payer: OTHER GOVERNMENT

## 2022-06-30 DIAGNOSIS — R29.898 LEG WEAKNESS, BILATERAL: Primary | ICD-10-CM

## 2022-06-30 DIAGNOSIS — R26.9 GAIT DIFFICULTY: ICD-10-CM

## 2022-06-30 PROCEDURE — 97112 NEUROMUSCULAR REEDUCATION: CPT | Mod: PO

## 2022-06-30 PROCEDURE — 97110 THERAPEUTIC EXERCISES: CPT | Mod: PO

## 2022-06-30 NOTE — PROGRESS NOTES
Physical Therapy Daily Treatment Note     Name: Mikey [facility] Number: [id number]    Therapy Diagnosis:   Encounter Diagnoses   Name Primary?    Leg weakness, bilateral Yes    Gait difficulty      Physician: Eloisa Frank,*    Visit Date: 6/30/2022   Physician Orders: PT Eval and Treat   Medical Diagnosis from Referral: E11.44 (ICD-10-CM) - Diabetic amyotrophy associated with type 2 diabetes mellitus      Evaluation Date: 3/28/2022  Authorization Period Expiration: 7/16/22  Plan of Care Expiration: 8/25/22  Visit # / Visits authorized:18/19     Time In:1645  Time Out: 1740  Total Billable Time: 55 minutes     Precautions: Standard, Diabetes and Fall      Subjective     Pt reports: he continues to have a good week with only some mild intermittent pain and has been feeling a little stronger.      He was not issued home exercise program.  Response to previous treatment:mild  soreness  Functional change: walking a little better    Pain: 0/10  Location: B LE      Objective     Mikey received therapeutic exercises to develop strength, endurance, ROM, flexibility, core stabilization and Balance for 40 minutes including:     Recumbent bike seat 14  x  5 min  Mini Squats 15x   Leg press: 30lbs seat 11 x 10, 40lbs x 15  6 inch step ups at stair case x 15 each leg  Heel raise 15x  Standing hip flexion x 15, 3lbs  Standing Hip ABD x 15, 3lbs  Side lying hip abd x 10 each with Assistance- not performed  HS Curl 20x:3lbs   Isometrics: Knee flex/ext: x 10 x 10 sec holds  Hip ext 20x: not performed  LAQ with 3 sec hold 20x- not performed  SLR with QS 10x: not performed  Bridging 20x - not performed  Hip ADD(30lbs) /Abd machine: - not performed  Clamshell 15x - not performed    Mikey participated in neuromuscular re-education activities to improve: Balance, Coordination, Kinesthetic, Sense and Proprioception for 15 minutes. The following activities were included:  NBOS 3 x 30 sec on foam   NBOS: with pertubations: 2 x 30  "sec NP  NBOS looking right/left on foam: 3 x 30 sec  Walking holding on with right hand in parallel bars looking right/left 3x  Staggered stance looking right and left 2 x 30" ea , eyes open eyes closed  Full tandem x 30 sec      Home Exercises Provided and Patient Education Provided     Education provided:   - effects of therapy  - progression of strength    Written Home Exercises Provided: Patient instructed to cont prior HEP.  Exercises were reviewed and Mikey was able to demonstrate them prior to the end of the session.  Mikey demonstrated good  understanding of the education provided.     See EMR under Patient Instructions for exercises provided prior visit.    Assessment     Mikey did well today and is moving better with improved gait stability and velocity today. He did well with the advancement of his exercises today. We are waiting for more visits to be approved and I will plan on issuing him a HEP on next visit as he is recovering better.      Mikey Is progressing well towards his goals.   Pt prognosis is Good.     Pt will continue to benefit from skilled outpatient physical therapy to address the deficits listed in the problem list box on initial evaluation, provide pt/family education and to maximize pt's level of independence in the home and community environment.     Pt's spiritual, cultural and educational needs considered and pt agreeable to plan of care and goals.    Anticipated barriers to physical therapy: none    Goals:   Short Term Goals: 3-4 weeks   Increase hip flexion  strength by 1/2 grade, progressing, not met  Increase hip Abd strength by 1/2 grade, progressing, not met  Increase left ankle EV/IV by 1/2 grade, progressing, not met  Increase sit to  30 sec by 4 or more reps, progressing, not met  Decrease TUG by 2 secs or more, progressing, not met     Long Term Goals: 8 weeks   Able to ambulate on tuff field w/o assisted device x 40 feet safely, progressing, not met  Increase sit to stand " in 30 sec to 13 or more, progressing, not met  Able to stand narrow stance w/o sway, progressing, not met  Able to ambulate with walking stick x 400 feet w/o break, progressing, not met  Increase LE strength to 4+/5 grossly, progressing, not met      Plan     Plan of care Certification: 6/28/2022 to 8/25/22.     Outpatient Physical Therapy 2 times weekly for 10 weeks to include the following interventions: Gait Training, Manual Therapy, Neuromuscular Re-ed, Patient Education, Self Care, Therapeutic Activities and Therapeutic Exercise      Sujit Urbina, PT

## 2022-07-05 ENCOUNTER — CLINICAL SUPPORT (OUTPATIENT)
Dept: REHABILITATION | Facility: HOSPITAL | Age: 67
End: 2022-07-05
Attending: PSYCHIATRY & NEUROLOGY
Payer: OTHER GOVERNMENT

## 2022-07-05 DIAGNOSIS — R26.9 GAIT DIFFICULTY: ICD-10-CM

## 2022-07-05 DIAGNOSIS — R29.898 LEG WEAKNESS, BILATERAL: Primary | ICD-10-CM

## 2022-07-05 PROCEDURE — 97110 THERAPEUTIC EXERCISES: CPT | Mod: PO

## 2022-07-05 NOTE — PROGRESS NOTES
Physical Therapy Daily Treatment Note     Name: Mikey [facility] Number: [id number]    Therapy Diagnosis:   Encounter Diagnoses   Name Primary?    Leg weakness, bilateral Yes    Gait difficulty      Physician: Eloisa Frank,*    Visit Date: 7/5/2022   Physician Orders: PT Eval and Treat   Medical Diagnosis from Referral: E11.44 (ICD-10-CM) - Diabetic amyotrophy associated with type 2 diabetes mellitus      Evaluation Date: 3/28/2022  Authorization Period Expiration: 7/16/22  Plan of Care Expiration: 8/25/22  Visit # / Visits authorized:19/20     Time In:1650  Time Out: 1735  Total Billable Time: 40 minutes     Precautions: Standard, Diabetes and Fall      Subjective     Pt reports: he did fine after last visit and is doing a l;ittle better with his ambulation he be lives.       He was not issued home exercise program.  Response to previous treatment:mild  soreness  Functional change: walking a little better    Pain: 0/10  Location: B LE      Objective     Mikey received therapeutic exercises to develop strength, endurance, ROM, flexibility, core stabilization and Balance for 40 minutes including:     Recumbent bike seat 14  x  5 min  Mini Squats 15x   Leg press: 30lbs seat 11 x 10, 40lbs x 15  6 inch step ups at stair case x 15 each leg  Heel raise 15x  Standing hip flexion x 15,   Standing Hip ABD x 15,   Side lying hip abd x 10 each with Assistance  HS Curl 20x:3lbs   Isometrics: Knee flex/ext: x 10 x 10 sec holds  Hip ext 20x: not performed  LAQ with 3 sec hold 20x- not performed  SLR with QS 10x: not performed  Bridging 20x - not performed  Hip ADD(30lbs) /Abd machine: - not performed  Clamshell 15x - not performed    Mikey participated in neuromuscular re-education activities to improve: Balance, Coordination, Kinesthetic, Sense and Proprioception for 0 minutes. The following activities were included:  NBOS 3 x 30 sec on foam   NBOS: with pertubations: 2 x 30 sec NP  NBOS looking right/left on foam: 3 x  "30 sec  Walking holding on with right hand in parallel bars looking right/left 3x  Staggered stance looking right and left 2 x 30" ea , eyes open eyes closed  Full tandem x 30 sec      Home Exercises Provided and Patient Education Provided     Education provided:   - effects of therapy  - reviewed HEP with him today    Written Home Exercises Provided: Patient instructed to cont prior HEP.  Exercises were reviewed and Mikey was able to demonstrate them prior to the end of the session.  Mikey demonstrated good  understanding of the education provided.     See EMR under Patient Instructions for exercises provided prior visit.    Assessment     Mikey has been making progress to help improve his ambulation and ability to perform ADLs. He still has significant hip and quad weakness but has been able to demonstrate improvement with strngth and endurance over the last few weeks. He would benefit from continued treatment in clinic and advancement of his HEP. He is waiting for additional visit to be approved and he will be on a HEP until that time.      iMkey Is progressing well towards his goals.   Pt prognosis is Good.     Pt will continue to benefit from skilled outpatient physical therapy to address the deficits listed in the problem list box on initial evaluation, provide pt/family education and to maximize pt's level of independence in the home and community environment.     Pt's spiritual, cultural and educational needs considered and pt agreeable to plan of care and goals.    Anticipated barriers to physical therapy: none    Goals:   Short Term Goals: 3-4 weeks   Increase hip flexion  strength by 1/2 grade,  met  Increase hip Abd strength by 1/2 grade, progressing, not met  Increase left ankle EV/IV by 1/2 grade, progressing, not met  Increase sit to  30 sec by 4 or more reps, progressing, not met  Decrease TUG by 2 secs or more,  met     Long Term Goals: 8 weeks   Able to ambulate on idiag field w/o assisted device x " 40 feet safely, progressing, not met  Increase sit to  30 sec to 13 or more, progressing, not met  Able to stand narrow stance w/o sway, progressing, not met  Able to ambulate with walking stick x 400 feet w/o break, progressing, not met  Increase LE strength to 4+/5 grossly, progressing, not met      Plan     Plan of care Certification: 6/28/2022 to 8/25/22.     Outpatient Physical Therapy 2 times weekly for 10 weeks to include the following interventions: Gait Training, Manual Therapy, Neuromuscular Re-ed, Patient Education, Self Care, Therapeutic Activities and Therapeutic Exercise      Sujit Urbina, PT

## 2022-07-06 ENCOUNTER — LAB VISIT (OUTPATIENT)
Dept: LAB | Facility: HOSPITAL | Age: 67
End: 2022-07-06
Attending: INTERNAL MEDICINE
Payer: OTHER GOVERNMENT

## 2022-07-06 ENCOUNTER — OFFICE VISIT (OUTPATIENT)
Dept: FAMILY MEDICINE | Facility: CLINIC | Age: 67
End: 2022-07-06
Payer: OTHER GOVERNMENT

## 2022-07-06 VITALS
SYSTOLIC BLOOD PRESSURE: 138 MMHG | BODY MASS INDEX: 28.12 KG/M2 | HEART RATE: 64 BPM | OXYGEN SATURATION: 98 % | DIASTOLIC BLOOD PRESSURE: 80 MMHG | WEIGHT: 196 LBS

## 2022-07-06 DIAGNOSIS — I10 ESSENTIAL HYPERTENSION: ICD-10-CM

## 2022-07-06 DIAGNOSIS — E11.42 TYPE 2 DIABETES MELLITUS WITH DIABETIC POLYNEUROPATHY, WITH LONG-TERM CURRENT USE OF INSULIN: Primary | ICD-10-CM

## 2022-07-06 DIAGNOSIS — E11.29 TYPE 2 DIABETES MELLITUS WITH MICROALBUMINURIA, WITH LONG-TERM CURRENT USE OF INSULIN: ICD-10-CM

## 2022-07-06 DIAGNOSIS — R74.8 ELEVATED CK: ICD-10-CM

## 2022-07-06 DIAGNOSIS — Z79.4 TYPE 2 DIABETES MELLITUS WITH MICROALBUMINURIA, WITH LONG-TERM CURRENT USE OF INSULIN: ICD-10-CM

## 2022-07-06 DIAGNOSIS — R80.9 TYPE 2 DIABETES MELLITUS WITH MICROALBUMINURIA, WITH LONG-TERM CURRENT USE OF INSULIN: ICD-10-CM

## 2022-07-06 DIAGNOSIS — Z79.4 TYPE 2 DIABETES MELLITUS WITH DIABETIC POLYNEUROPATHY, WITH LONG-TERM CURRENT USE OF INSULIN: ICD-10-CM

## 2022-07-06 DIAGNOSIS — E11.42 TYPE 2 DIABETES MELLITUS WITH DIABETIC POLYNEUROPATHY, WITH LONG-TERM CURRENT USE OF INSULIN: ICD-10-CM

## 2022-07-06 DIAGNOSIS — Z79.4 TYPE 2 DIABETES MELLITUS WITH DIABETIC POLYNEUROPATHY, WITH LONG-TERM CURRENT USE OF INSULIN: Primary | ICD-10-CM

## 2022-07-06 DIAGNOSIS — E11.44 DIABETIC AMYOTROPHY ASSOCIATED WITH TYPE 2 DIABETES MELLITUS: ICD-10-CM

## 2022-07-06 LAB
CK SERPL-CCNC: 736 U/L (ref 20–200)
ESTIMATED AVG GLUCOSE: 209 MG/DL (ref 68–131)
HBA1C MFR BLD: 8.9 % (ref 4–5.6)

## 2022-07-06 PROCEDURE — 99999 PR PBB SHADOW E&M-EST. PATIENT-LVL IV: ICD-10-PCS | Mod: PBBFAC,,, | Performed by: INTERNAL MEDICINE

## 2022-07-06 PROCEDURE — 99999 PR PBB SHADOW E&M-EST. PATIENT-LVL IV: CPT | Mod: PBBFAC,,, | Performed by: INTERNAL MEDICINE

## 2022-07-06 PROCEDURE — 99214 OFFICE O/P EST MOD 30 MIN: CPT | Mod: PBBFAC,PO | Performed by: INTERNAL MEDICINE

## 2022-07-06 PROCEDURE — 36415 COLL VENOUS BLD VENIPUNCTURE: CPT | Mod: PO | Performed by: INTERNAL MEDICINE

## 2022-07-06 PROCEDURE — 99214 OFFICE O/P EST MOD 30 MIN: CPT | Mod: S$PBB,,, | Performed by: INTERNAL MEDICINE

## 2022-07-06 PROCEDURE — 83036 HEMOGLOBIN GLYCOSYLATED A1C: CPT | Performed by: INTERNAL MEDICINE

## 2022-07-06 PROCEDURE — 99214 PR OFFICE/OUTPT VISIT, EST, LEVL IV, 30-39 MIN: ICD-10-PCS | Mod: S$PBB,,, | Performed by: INTERNAL MEDICINE

## 2022-07-06 PROCEDURE — 82550 ASSAY OF CK (CPK): CPT | Performed by: INTERNAL MEDICINE

## 2022-07-06 RX ORDER — LISINOPRIL 20 MG/1
20 TABLET ORAL DAILY
Qty: 90 TABLET | Refills: 3 | Status: SHIPPED | OUTPATIENT
Start: 2022-07-06 | End: 2022-10-25 | Stop reason: SDUPTHER

## 2022-07-06 NOTE — PROGRESS NOTES
"  Subjective     Mikey James is a 66 y.o. old, male here for Follow-up    66-year-old with PMH of Type 2 IDDM with neuropathy/retinopathy, HTN, HLD, diabetic amyotrophy.  Here today with his SO.    DM: Controlled based on FBG's, some of which have been low in the morning. He is on Lantus 52 u daily, has protein shake in the morning, small soup an lunch and a bigger dinner, but still doesn't eat very much.  Neuropathy improving, remains on gabapentin.  Seeing improvement in balance, working with PT for strength.    Review of Systems   Respiratory: Negative.    Cardiovascular: Negative.      Medications     Outpatient Medications Marked as Taking for the 7/6/22 encounter (Office Visit) with Branden Gallardo MD   Medication Sig Dispense Refill    aspirin (ECOTRIN) 81 MG EC tablet Take 81 mg by mouth once daily.      blood sugar diagnostic (FREESTYLE LITE STRIPS) Strp USE TO CHECK BLOOD GLUCOSE ONCE DAILY 200 strip 2    blood-glucose meter kit To check BG 1 times daily, to use with insurance preferred meter. ICD10: E11. 1 each 1    blood-glucose meter,continuous (DEXCOM G6 ) Misc Use as directed 1 each 1    flash glucose scanning reader (FREESTYLE COURTNEY 14 DAY READER) Misc As directed 4 each 2    flash glucose sensor (FREESTYLE COURTNEY 14 DAY SENSOR) Kit As directed 1 kit 1    gabapentin (NEURONTIN) 300 MG capsule Take 1 capsule (300 mg total) by mouth 3 (three) times daily. 270 capsule 3    insulin (LANTUS SOLOSTAR U-100 INSULIN) glargine 100 units/mL (3mL) SubQ pen Inject 52 Units into the skin once daily. 48 mL 1    lancets (FREESTYLE LANCETS) 28 gauge Misc TO CHECK BLOOD GLUCOSE ONCE DAILY 100 each 2    LUTEIN ORAL Take by mouth.      pen needle, diabetic 32 gauge x 5/16" Ndle 1 each by Misc.(Non-Drug; Combo Route) route as needed. 100 each 0    rosuvastatin (CRESTOR) 5 MG tablet Take 1 tablet (5 mg total) by mouth once daily. 90 tablet 3    [DISCONTINUED] lisinopriL (PRINIVIL,ZESTRIL) 20 MG " tablet Take 1 tablet (20 mg total) by mouth once daily. 90 tablet 3     Objective     /80 (Patient Position: Sitting)   Pulse 64   Wt 88.9 kg (195 lb 15.8 oz)   SpO2 98%   BMI 28.12 kg/m²   Physical Exam  Constitutional:       General: He is not in acute distress.     Appearance: He is well-developed.   Cardiovascular:      Pulses:           Dorsalis pedis pulses are 2+ on the right side and 2+ on the left side.        Posterior tibial pulses are 2+ on the right side and 2+ on the left side.   Musculoskeletal:      Right foot: Normal range of motion. No deformity.      Left foot: Normal range of motion. No deformity.   Feet:      Right foot:      Protective Sensation: 5 sites tested. 2 sites sensed.      Skin integrity: Skin integrity normal.      Toenail Condition: Right toenails are normal.      Left foot:      Protective Sensation: 5 sites tested. 2 sites sensed.      Skin integrity: Skin integrity normal.      Toenail Condition: Left toenails are normal.      Comments: Pedal edema bilaterally  Neurological:      Mental Status: He is alert.       Assessment and Plan     Type 2 diabetes mellitus with diabetic polyneuropathy, with long-term current use of insulin  -     Hemoglobin A1C; Future; Expected date: 07/06/2022    Elevated CK  -     CK; Future; Expected date: 07/06/2022    Type 2 diabetes mellitus with microalbuminuria, with long-term current use of insulin  -     lisinopriL (PRINIVIL,ZESTRIL) 20 MG tablet; Take 1 tablet (20 mg total) by mouth once daily.  Dispense: 90 tablet; Refill: 3    Essential hypertension    Diabetic amyotrophy associated with type 2 diabetes mellitus        Follow up in about 3 months (around 10/6/2022).  ___________________  Branden Gallardo MD  Internal Medicine and Pediatrics

## 2022-07-07 ENCOUNTER — PATIENT MESSAGE (OUTPATIENT)
Dept: FAMILY MEDICINE | Facility: CLINIC | Age: 67
End: 2022-07-07
Payer: OTHER GOVERNMENT

## 2022-07-19 ENCOUNTER — CLINICAL SUPPORT (OUTPATIENT)
Dept: REHABILITATION | Facility: HOSPITAL | Age: 67
End: 2022-07-19
Attending: PSYCHIATRY & NEUROLOGY
Payer: OTHER GOVERNMENT

## 2022-07-19 DIAGNOSIS — R29.898 LEG WEAKNESS, BILATERAL: Primary | ICD-10-CM

## 2022-07-19 DIAGNOSIS — R26.9 GAIT DIFFICULTY: ICD-10-CM

## 2022-07-19 PROCEDURE — 97110 THERAPEUTIC EXERCISES: CPT | Mod: PO

## 2022-07-19 NOTE — PROGRESS NOTES
Physical Therapy Daily Treatment Note     Name: Mikey [facility] ([facility] Number: [id number]    Therapy Diagnosis:   Encounter Diagnoses   Name Primary?    Leg weakness, bilateral Yes    Gait difficulty      Physician: Eloisa Frank,*    Visit Date: 7/19/2022   Physician Orders: PT Eval and Treat   Medical Diagnosis from Referral: E11.44 (ICD-10-CM) - Diabetic amyotrophy associated with type 2 diabetes mellitus      Evaluation Date: 3/28/2022  Authorization Period Expiration: 8/12/22  Plan of Care Expiration: 8/25/22  Visit # / Visits authorized:20/25     Time In:1645  Time Out: 1730  Total Billable Time: 40 minutes     Precautions: Standard, Diabetes and Fall      Subjective     Pt reports:he has noticed some increase in his LE fatigue with ADLs and with ambulation at work. His pain in his legs has mostly resolved at this time.     He was not issued home exercise program.  Response to previous treatment:mild  soreness  Functional change: walking a little better    Pain: 0/10  Location: B LE      Objective     Mikey received therapeutic exercises to develop strength, endurance, ROM, flexibility, core stabilization and Balance for 40 minutes including:     Recumbent bike seat 14  x  5 min  Mini Squats 15x   Leg press: 30lbs seat 11 x 10, 30lbs x 15  6 inch step ups at stair case x 15 each leg  Heel raise 15x  Standing hip flexion x 15,   Standing Hip ABD x 15,   Side lying hip abd x 10 each with Assistance  HS Curl 20x  Isometrics: Knee flex/ext: x 10 x 10 sec holds: not performed  Hip ext 20x: not performed  LAQ with 3 sec hold 20x- not performed  SLR with QS 10x: not performed  Bridging 20x - not performed  Hip ADD(30lbs) /Abd machine: - not performed  Clamshell 15x - not performed    Mikey participated in neuromuscular re-education activities to improve: Balance, Coordination, Kinesthetic, Sense and Proprioception for 5 minutes. The following activities were included:  NBOS 3 x 30 sec on foam : not performed  NBOS:  "with pertubations: 2 x 30 sec NP: not performed  NBOS looking right/left on foam: 3 x 30 sec  Walking holding on with right hand in parallel bars looking right/left 3x  Staggered stance looking right and left 2 x 30" ea , eyes open eyes closed  Full tandem x 30 sec      Home Exercises Provided and Patient Education Provided     Education provided:   - effects of therapy  - reviewed HEP with him today    Written Home Exercises Provided: Patient instructed to cont prior HEP.  Exercises were reviewed and Mikey was able to demonstrate them prior to the end of the session.  Mikey demonstrated good  understanding of the education provided.     See EMR under Patient Instructions for exercises provided prior visit.    Assessment     Mikey has had some decrease in endurance and strength in his lower extremity but did good since he hasn't been exercises as much.     Mikey Is progressing well towards his goals.   Pt prognosis is Good.     Pt will continue to benefit from skilled outpatient physical therapy to address the deficits listed in the problem list box on initial evaluation, provide pt/family education and to maximize pt's level of independence in the home and community environment.     Pt's spiritual, cultural and educational needs considered and pt agreeable to plan of care and goals.    Anticipated barriers to physical therapy: none    Goals:   Short Term Goals: 3-4 weeks   Increase hip flexion  strength by 1/2 grade,  met  Increase hip Abd strength by 1/2 grade, progressing, not met  Increase left ankle EV/IV by 1/2 grade, progressing, not met  Increase sit to  30 sec by 4 or more reps, progressing, not met  Decrease TUG by 2 secs or more,  met     Long Term Goals: 8 weeks   Able to ambulate on tuff field w/o assisted device x 40 feet safely, progressing, not met  Increase sit to  30 sec to 13 or more, progressing, not met  Able to stand narrow stance w/o sway, progressing, not met  Able to ambulate with " walking stick x 400 feet w/o break, progressing, not met  Increase LE strength to 4+/5 grossly, progressing, not met      Plan     Plan of care Certification: 6/28/2022 to 8/25/22.     Outpatient Physical Therapy 2 times weekly for 10 weeks to include the following interventions: Gait Training, Manual Therapy, Neuromuscular Re-ed, Patient Education, Self Care, Therapeutic Activities and Therapeutic Exercise      Sujit Urbina, PT

## 2022-07-26 ENCOUNTER — CLINICAL SUPPORT (OUTPATIENT)
Dept: REHABILITATION | Facility: HOSPITAL | Age: 67
End: 2022-07-26
Attending: PSYCHIATRY & NEUROLOGY
Payer: OTHER GOVERNMENT

## 2022-07-26 DIAGNOSIS — R29.898 LEG WEAKNESS, BILATERAL: Primary | ICD-10-CM

## 2022-07-26 DIAGNOSIS — R26.9 GAIT DIFFICULTY: ICD-10-CM

## 2022-07-26 PROCEDURE — 97112 NEUROMUSCULAR REEDUCATION: CPT | Mod: PO,CQ

## 2022-07-26 PROCEDURE — 97110 THERAPEUTIC EXERCISES: CPT | Mod: PO,CQ

## 2022-07-26 NOTE — PROGRESS NOTES
Physical Therapy Daily Treatment Note     Name: Mikey [facility] Number: [id number]    Therapy Diagnosis:   Encounter Diagnoses   Name Primary?    Leg weakness, bilateral Yes    Gait difficulty      Physician: Eloisa Frank,*    Visit Date: 7/26/2022   Physician Orders: PT Eval and Treat   Medical Diagnosis from Referral: E11.44 (ICD-10-CM) - Diabetic amyotrophy associated with type 2 diabetes mellitus      Evaluation Date: 3/28/2022  Authorization Period Expiration: 8/12/22  Plan of Care Expiration: 8/25/22  Visit # / Visits authorized:21/25     Time In:5:30 PM  Time Out: 6:15 PM  Total Billable Time: 45 minutes     Precautions: Standard, Diabetes and Fall      Subjective     Pt reports that:his legs are sore today due to walking a lot at work.     He was not issued home exercise program.  Response to previous treatment:mild  soreness  Functional change: walking a little better    Pain: 3/10  Location: B LE      Objective     Mikey received therapeutic exercises to develop strength, endurance, ROM, flexibility, core stabilization and Balance for 35 minutes including:     Recumbent bike seat 14  x  5 min  Mini Squats 15x   Leg press: 30lbs seat 11  x 15  6 inch step ups at stair case x 15 each leg  Heel raise 15x  Standing hip flexion x 15,   Standing Hip ABD x 15,   Side lying hip abd x 10 each with Assistance  HS Curl 20x  Isometrics: Knee flex/ext: x 10 x 10 sec holds: not performed  Hip ext 15x:   LAQ with 3 sec hold 20x-   SLR with QS 10x:  Bridging 20x -  Hip ADD(30lbs) /Abd machine: - 15x  Clamshell 15x - not performed    Mikey participated in neuromuscular re-education activities to improve: Balance, Coordination, Kinesthetic, Sense and Proprioception for 10 minutes. The following activities were included:  NBOS 3 x 30 sec on foam : not performed  NBOS: with pertubations: 2 x 30 sec   NBOS looking right/left on foam: 3 x 30 sec  Walking holding on with right hand in parallel bars looking right/left  "3x  Staggered stance looking right and left 2 x 30" ea , eyes open eyes closed NP  Full tandem x 30 sec      Home Exercises Provided and Patient Education Provided     Education provided:   - effects of therapy  - reviewed HEP with him today    Written Home Exercises Provided: Patient instructed to cont prior HEP.  Exercises were reviewed and Mikey was able to demonstrate them prior to the end of the session.  Mikey demonstrated good  understanding of the education provided.     See EMR under Patient Instructions for exercises provided prior visit.    Assessment     Mikey adryan today's tx with the usual fatigue. He requires frequent rest breaks during and between exs. He demonstrates good effort and motivation. He continues with bal issues with but does demonstrates good attempts at proper correction strategies.    Mikey Is progressing well towards his goals.   Pt prognosis is Good.     Pt will continue to benefit from skilled outpatient physical therapy to address the deficits listed in the problem list box on initial evaluation, provide pt/family education and to maximize pt's level of independence in the home and community environment.     Pt's spiritual, cultural and educational needs considered and pt agreeable to plan of care and goals.    Anticipated barriers to physical therapy: none    Goals:   Short Term Goals: 3-4 weeks   Increase hip flexion  strength by 1/2 grade,  met  Increase hip Abd strength by 1/2 grade, progressing, not met  Increase left ankle EV/IV by 1/2 grade, progressing, not met  Increase sit to  30 sec by 4 or more reps, progressing, not met  Decrease TUG by 2 secs or more,  met     Long Term Goals: 8 weeks   Able to ambulate on [employer] field w/o assisted device x 40 feet safely, progressing, not met  Increase sit to  30 sec to 13 or more, progressing, not met  Able to stand narrow stance w/o sway, progressing, not met  Able to ambulate with walking stick x 400 feet w/o break, " progressing, not met  Increase LE strength to 4+/5 grossly, progressing, not met      Plan     Plan of care Certification: 6/28/2022 to 8/25/22.     Outpatient Physical Therapy 2 times weekly for 10 weeks to include the following interventions: Gait Training, Manual Therapy, Neuromuscular Re-ed, Patient Education, Self Care, Therapeutic Activities and Therapeutic Exercise      Steve Coy, PTA

## 2022-08-04 ENCOUNTER — CLINICAL SUPPORT (OUTPATIENT)
Dept: REHABILITATION | Facility: HOSPITAL | Age: 67
End: 2022-08-04
Attending: PSYCHIATRY & NEUROLOGY
Payer: OTHER GOVERNMENT

## 2022-08-04 ENCOUNTER — TELEPHONE (OUTPATIENT)
Dept: NEUROLOGY | Facility: CLINIC | Age: 67
End: 2022-08-04
Payer: OTHER GOVERNMENT

## 2022-08-04 DIAGNOSIS — R26.9 GAIT DIFFICULTY: ICD-10-CM

## 2022-08-04 DIAGNOSIS — R29.898 LEG WEAKNESS, BILATERAL: Primary | ICD-10-CM

## 2022-08-04 PROCEDURE — 97110 THERAPEUTIC EXERCISES: CPT | Mod: PO

## 2022-08-04 NOTE — TELEPHONE ENCOUNTER
Received via fax from [facility].  auth/order [id number] Therapeutic procedure 25 units between 3/17/2022-08/12/2022 and PT eval 1 unit between 3/18/2022-07/16/2022.  Doc sent to scanning.

## 2022-08-04 NOTE — PLAN OF CARE
[facility]  Physical Therapy reassessment / updated POC    Name: Mikey Ramirez  [facility] Number: [id number]    Therapy Diagnosis:   Encounter Diagnoses   Name Primary?    Leg weakness, bilateral Yes    Gait difficulty      Physician: Eloisa Frank,*    Physician Orders: PT Eval and Treat   Medical Diagnosis from Referral: E11.44 (ICD-10-CM) - Diabetic amyotrophy associated with type 2 diabetes mellitus     Evaluation Date: 8/4/2022  Authorization Period Expiration:8/12/22  Plan of Care Expiration: 5/28/22  Visit # / Visits authorized: 22/ 25    Time In: 1640  Time Out: 1735  Total Billable Time: 50 minutes    Precautions: Standard, Diabetes and Fall    Subjective   Date of onset: : a couple of years  History of current condition - Mikey reports: over the past 2 years he has had progressive LE weakness and loss of balance . His right side LE is stronger than left. He hasn't had a lot of difficulty  with his UE. He needs to use a walking stick to help steady him but can ambulate w/o it. He has trouble walking after initially standing up . He has some pain with any pressure to his thighs and or buttocks    5/17/22: patient reports; he continues to have difficulty with walking at work due to distance. He though it would be getting easier with his workouts. He has some soreness for about a day after his visits. He mainly notices it in his quads.    6/28/22: Patient reports: He has been feeling good after last visit. He felt like he could move better. His weeks still seem to vary with days where he can walk better and feels stronger with less pain and others where it is more challenging and it doesn't seem to follow a pattern. Overall he does feel his balance has improved .     8/4/22: Patient reports: he has been having some increased fatigue and weakness over the past 1-2 weeks. He has noticed it is more difficult to walk at work in the afternoon. He still is only having intermittent pain  in his legs but it is rare. He has been doing what he can at home but it isn't the same         Past Medical History:   Diagnosis Date    GERD (gastroesophageal reflux disease)     High cholesterol     Hypertension     Type 2 diabetes mellitus     LBSL 140 today --- running high     Mikey Ramirez  has a past surgical history that includes YAG CAP (Bilateral, 2012) and Cataract extraction (Bilateral, 2003).    Mikey has a current medication list which includes the following prescription(s): aspirin, blood sugar diagnostic, blood-glucose meter, dexcom g6 , freestyle dolores 14 day reader, freestyle dolores 14 day sensor, gabapentin, lantus solostar u-100 insulin, insulin lispro, lancets, lisinopril, lutein, pen needle, diabetic, and rosuvastatin.    Review of patient's allergies indicates:   Allergen Reactions    No known allergies         Imaging, none:   EMG:Electrodiagnostic Impression:  1. 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  2. Needle EMG sampling of the bilateral lower extremities revealed abnormal findings in all muscles sampled.  Abnormal spontaneous activity was observed in all muscles.  Considering the diffuse nature of abnormalities, a diagnosis for a definitive neuro pathologic process cannot be made at this time.  Potential etiologies include motor neuron disease, bilateral lumbosacral plexopathy (including diabetic amyotrophy), and/or a combination of multilevel lumbosacral radiculopathy and severe peripheral polyneuropathy.  While motor unit analysis revealed findings more suggestive of a neuropathic type process, a myopathic process is also possibility, although lower on the differential. Correlation required to dictate further testing.      Prior Therapy: none  Social History:  lives with their spouse  Occupation:   Prior Level of Function: Independent   Current Level of Function:  Modified Independent     Pain:  Current 0/10, worst 2/10, best 0/10   Location: bilateral Glute   Description: Aching and Dull  Aggravating Factors: Sitting and pressure on thigh/buttocks  Easing Factors: changing position    Pts goals: increase his strength and balance so he can do more functionally     Objective   Mental status: oriented x3  Posture/ Alignment: Fair    GAIT DEVIATIONS: Mikey amb with decreased velocity of limb motion, decreased step length, decreased stride length, increased stride width and decreased weight-shifting ability.with walking stick and decreased balance, steppage type gait    Decreased standing balance with narrow stance with increased sway with Eyes open and closed, unable to  staggered stance    30 sec sit to stand test: 9  TUG: 15.3 sec    Strength   Right LE Left LE   Hip flexion 4/5 3+/5   Hip extension      Hip abduction 4-/5 4-/5   Hip adduction 4/5 4/5   Knee flexion 4+/5 4+/5   Knee extension 4/5 4/5        Peroneals 4/5 4/5   Tibialis anterior 4+/5 4/5   Tibialis posterior 4+/5 4/5   Gatroc/soleus 4+/5 4+/5       ROM: shoulder    AROM Right Left Comment   Shoulder Flexion: WNL WNL    Shoulder Abduction: WNL WNL    Shoulder ER: WNL WNL    Shoulder Ir:  WNL WNL    *pain    Strength:      Right Left Comment   Shoulder flexion: 5/5 5/5    Shoulder Abduction: 5/5 5/5    Shoulder ER: 4-/5 4-/5    Shoulder IR: 5/5 5/5    Lower Trap: 4/5 4/5    Middle Trap: 4/5 4/5            Palpation:decreased   TTP quads       Treatment Time In: 1645  Treatment Time Out: 1735  Total Treatment time separate from Evaluation: 50 minutes      Mikey received therapeutic exercises to develop strength, endurance, ROM, flexibility, core stabilization and Balance for 35 minutes including:    Recumbent bike seat 14  x  10 min  Mini Squats 15x  Leg press:  seat 11, 30lbs x 10, 40# x 15  6 inch step ups at stair case x 20 each leg  Heel raise 15x  Standing Hip ABD x 10 NP  Side lying hip abd x 10 each  "with and without Assistance-   HS Curl 20x: 2lbs   Marchinlbs: x   Hip ext 20x: not performed  LAQ with 3 sec hold 20x, 2lbs  SLR with QS 10x: not performed  Bridging 20x - not performed  Hip ADD(30lbs) /Abd machine: - not performed  Clamshell 15x - not performed     Mikey participated in neuromuscular re-education activities to improve: Balance, Coordination, Kinesthetic, Sense and Proprioception for 15 minutes. The following activities were included:  NBOS 3 x 30 sec on foam : not performed  NBOS: with pertubations: 2 x 30 sec NP  NBOS looking right/left on foam: 3 x 30 sec  Walking holding on with right hand in parallel bars looking right/left 3x  Staggered stance looking right and left 2 x 30" ea   Full tandem x 30 sec        CMS Impairment/Limitation/Restriction for FOTO LE Survey    Therapist reviewed FOTO scores for Mikey Ramirez on 2022.   FOTO documents entered into [employer] - see Media section.    Limitation Score: not collected           Home Exercises and Patient Education Provided    Education provided:   - progression of exercises  - may be a little sore    Written Home Exercises Provided: Plan to give on 2nd-3rd visit after we see how he does with exercises.  Exercises were reviewed and Mikey was able to demonstrate them prior to the end of the session.  Mikey demonstrated good  understanding of the education provided.     See EMR under N/A for exercises provided N/A  Pt/family was provided educational information, including: role of PT, goals for PT, scheduling - pt verbalized understanding. Discussed insurance plan with pt    Assessment   Pt is reporting some increased weakness which is most likely due to having to miss some PT time due to getting authorization from [facility]. He still has significant difficulty with ADLs due to mainly LE weakness and decreased coordination/propreception. He is demonstrating some improvement with his LE strength but has a long way to go to help maximize his function and " safety with weight bearing activities. His balance has improved but it is difficult for him to work on that at home due to safety. He does do some exercises for it which is helpful.      Pt prognosis is Good.   Pt will benefit from skilled outpatient Physical Therapy to address the deficits stated above and in the chart below, provide pt/family education, and to maximize pt's level of independence.     Plan of care discussed with patient: Yes  Pt's spiritual, cultural and educational needs considered and patient is agreeable to the plan of care and goals as stated below:     Anticipated Barriers for therapy: none    Goals:  Short Term Goals: 3-4 weeks   Increase hip flexion  strength by 1/2 grade, met  Increase hip Abd strength by 1/2 grade, progressing, not met  Increase left ankle EV/IV by 1/2 grade,  met  Increase sit to  30 sec by 4 or more reps, met  Decrease TUG by 2 secs or more,  met    Long Term Goals: 8 weeks   Able to ambulate on tuff field w/o assisted device x 40 feet safely, progressing, not met  Increase sit to  30 sec to 13 or more, progressing, not met  Able to stand narrow stance w/o sway, progressing, not met  Able to ambulate with walking stick x 400 feet w/o break, progressing, not met  Increase LE strength to 4+/5 grossly, progresing, not met      Plan   Plan of care Certification: 8/4/2022 to 10/14/22.    Outpatient Physical Therapy 2 times weekly for 10 weeks to include the following interventions: Gait Training, Manual Therapy, Neuromuscular Re-ed, Patient Education, Self Care, Therapeutic Activities and Therapeutic Exercise.     Sujit Urbina, PT

## 2022-08-08 ENCOUNTER — CLINICAL SUPPORT (OUTPATIENT)
Dept: REHABILITATION | Facility: HOSPITAL | Age: 67
End: 2022-08-08
Attending: PSYCHIATRY & NEUROLOGY
Payer: OTHER GOVERNMENT

## 2022-08-08 DIAGNOSIS — R29.898 LEG WEAKNESS, BILATERAL: Primary | ICD-10-CM

## 2022-08-08 DIAGNOSIS — R26.9 GAIT DIFFICULTY: ICD-10-CM

## 2022-08-08 PROCEDURE — 97112 NEUROMUSCULAR REEDUCATION: CPT | Mod: PO,CQ

## 2022-08-08 PROCEDURE — 97110 THERAPEUTIC EXERCISES: CPT | Mod: PO,CQ

## 2022-08-08 NOTE — PROGRESS NOTES
Physical Therapy Daily Treatment Note     Name: Mikey Ramirez  [facility] Number: [id number]    Therapy Diagnosis:   Encounter Diagnoses   Name Primary?    Leg weakness, bilateral Yes    Gait difficulty      Physician: Eloisa Frank,*    Visit Date: 2022   Physician Orders: PT Eval and Treat   Medical Diagnosis from Referral: E11.44 (ICD-10-CM) - Diabetic amyotrophy associated with type 2 diabetes mellitus      Evaluation Date: 3/28/2022  Authorization Period Expiration: 22  Plan of Care Expiration: 22  Visit # / Visits authorized:     Time In: 4:45 PM  Time Out: 5:40 PM  Total Billable Time: 55 minutes     Precautions: Standard, Diabetes and Fall      Subjective     Pt reports: He is having pain in the L arch of the foot.  Pt having stiffness but no more than usual.  Pt reports he is usually more stiff towards the end of the day.  Pt reports the arch pain feels like a pinching and this pain comes and goes.  Pt goes back to the Primary Care physician on Oct 6th.  Pt feels like he has no endurance and his muscles tighten up quickly.  Pt is able to walk without the cane but by the end of the day, he feels like he needs the cane for safety.  He was not issued home exercise program.  Response to previous treatment: mild soreness that niht  Functional change: walking a little better    Pain: 3/10  Location: L arch of the foot      Objective     Mikey received therapeutic exercises to develop strength, endurance, ROM, flexibility, core stabilization and Balance for 40 minutes including:     Recumbent bike seat 14  x  10 min for endurance  Mini Squats 15x  Leg press:  seat 11, 30lbs x 10, 40# x 15  6 inch step ups at stair case x 20 each leg  Heel raise 15x  Standing Hip ABD x 10 2#  Side lying hip abd x 10 each with and without Assistance-   HS Curl 20x: 2lbs   Marchinlbs: 20x   Hip ext 20x  LAQ with 3 sec hold 20x, 2lbs  SLR with QS 10x: not performed  Bridging 20x - not performed  Hip ADD(30lbs)  "/Abd machine: - not performed  Clamshell 15x - not performed     Mikey participated in neuromuscular re-education activities to improve: Balance, Coordination, Kinesthetic, Sense and Proprioception for 15 minutes. The following activities were included:  NBOS 3 x 30 sec on foam : not performed  NBOS: with pertubations: 2 x 30 sec NP  NBOS looking right/left on foam: 3 x 30 sec  Walking holding on with right hand in parallel bars looking right/left 3x  Staggered stance looking right and left 2 x 30" ea   Full tandem x 30 sec       Home Exercises Provided and Patient Education Provided     Education provided:   - effects of therapy    Written Home Exercises Provided: Patient instructed to cont prior HEP.  Exercises were reviewed and Mikey was able to demonstrate them prior to the end of the session.  Mikey demonstrated good  understanding of the education provided.     See EMR under Patient Instructions for exercises provided prior visit.    Assessment     Pt still greatly challenged with full tandem and required UE support during walking with head turns.  Pt able to tolerate close to an hour of treatment with minimal breaks.  Will continue working on trying to improve balance and strength within tolerance.    Mikey Is progressing well towards his goals.   Pt prognosis is Good.     Pt will continue to benefit from skilled outpatient physical therapy to address the deficits listed in the problem list box on initial evaluation, provide pt/family education and to maximize pt's level of independence in the home and community environment.     Pt's spiritual, cultural and educational needs considered and pt agreeable to plan of care and goals.    Anticipated barriers to physical therapy: none    Goals:   Short Term Goals: 3-4 weeks   Increase hip flexion  strength by 1/2 grade,  met  Increase hip Abd strength by 1/2 grade, progressing, not met  Increase left ankle EV/IV by 1/2 grade, progressing, not met  Increase sit to  " 30 sec by 4 or more reps, progressing, not met  Decrease TUG by 2 secs or more,  met     Long Term Goals: 8 weeks   Able to ambulate on tuff field w/o assisted device x 40 feet safely, progressing, not met  Increase sit to  30 sec to 13 or more, progressing, not met  Able to stand narrow stance w/o sway, progressing, not met  Able to ambulate with walking stick x 400 feet w/o break, progressing, not met  Increase LE strength to 4+/5 grossly, progressing, not met      Plan     Plan of care Certification: 8/4/2022 to 10/14/22.     Outpatient Physical Therapy 2 times weekly for 10 weeks to include the following interventions: Gait Training, Manual Therapy, Neuromuscular Re-ed, Patient Education, Self Care, Therapeutic Activities and Therapeutic Exercise      Latoya Miller, PTA

## 2022-08-11 ENCOUNTER — CLINICAL SUPPORT (OUTPATIENT)
Dept: REHABILITATION | Facility: HOSPITAL | Age: 67
End: 2022-08-11
Attending: PSYCHIATRY & NEUROLOGY
Payer: OTHER GOVERNMENT

## 2022-08-11 DIAGNOSIS — R26.9 GAIT DIFFICULTY: ICD-10-CM

## 2022-08-11 DIAGNOSIS — R29.898 LEG WEAKNESS, BILATERAL: Primary | ICD-10-CM

## 2022-08-11 PROCEDURE — 97112 NEUROMUSCULAR REEDUCATION: CPT | Mod: PO

## 2022-08-11 PROCEDURE — 97110 THERAPEUTIC EXERCISES: CPT | Mod: PO

## 2022-08-11 NOTE — PROGRESS NOTES
Physical Therapy Daily Treatment Note     Name: Mikey [facility] Number: [id number]    Therapy Diagnosis:   Encounter Diagnoses   Name Primary?    Leg weakness, bilateral Yes    Gait difficulty      Physician: Eloisa Frank,*    Visit Date: 2022   Physician Orders: PT Eval and Treat   Medical Diagnosis from Referral: E11.44 (ICD-10-CM) - Diabetic amyotrophy associated with type 2 diabetes mellitus      Evaluation Date: 3/28/2022  Authorization Period Expiration: 22  Plan of Care Expiration: 22  Visit # / Visits authorized:     Time In: 4:45 PM  Time Out: 5:40 PM  Total Billable Time: 55 minutes     Precautions: Standard, Diabetes and Fall      Subjective     Pt reports: he is still struggling to improve his overall endurance with walking and hasn't noticed a big change it it recently even with doing his exercises in the clinic. He hasn't had pain recently but does have tightness in his legs.     He was not issued home exercise program.  Response to previous treatment: mild soreness that niht  Functional change: walking a little better    Pain: 0/10  Location: N/A     Objective     Mikey received therapeutic exercises to develop strength, endurance, ROM, flexibility, core stabilization and Balance for 40 minutes including:     Recumbent bike seat 14  x  10 min for endurance  Mini Squats 15x  Leg press:  seat 11, 30lbs x 10, 40# x 15  6 inch step ups at stair case x 20 each leg  Heel raise 15x  Standing Hip ABD x 10 2#  Side lying hip abd x 10 each with and without Assistance-   HS Curl 20x: 2lbs   Marchinlbs: 20x   Hip ext 20x  LAQ with 3 sec hold 20x, 2lbs  SLR with QS 10x: not performed  Bridging 20x - not performed  Hip ADD(30lbs) /Abd machine: - not performed  Clamshell 15x - not performed     Mikey participated in neuromuscular re-education activities to improve: Balance, Coordination, Kinesthetic, Sense and Proprioception for 15 minutes. The following activities were  "included:  NBOS 3 x 30 sec on foam : not performed  NBOS: with pertubations: 2 x 30 sec NP  NBOS looking right/left on foam: 3 x 30 sec  Walking holding on with right hand in parallel bars looking right/left 3x  Staggered stance looking right and left 2 x 30" ea   Full tandem x 30 sec       Home Exercises Provided and Patient Education Provided     Education provided:   - effects of therapy    Written Home Exercises Provided: Patient instructed to cont prior HEP.  Exercises were reviewed and Mikey was able to demonstrate them prior to the end of the session.  Mikey demonstrated good  understanding of the education provided.     See EMR under Patient Instructions for exercises provided prior visit.    Assessment     Pt has been able to advance some of his exercises in the clinic but still has significant LE weakness in his hips and quads that make ambulating for longer distances difficult. I am not sure why he has not noticed and overall improvement with his endurance with gait to this point. I may try a more aggressive approach which I haven't had him doing a lot at home between session because previously he needed that recovery tiime but I may try some additional exercises at home and see how he does with them.     Mikey Is progressing well towards his goals.   Pt prognosis is Good.     Pt will continue to benefit from skilled outpatient physical therapy to address the deficits listed in the problem list box on initial evaluation, provide pt/family education and to maximize pt's level of independence in the home and community environment.     Pt's spiritual, cultural and educational needs considered and pt agreeable to plan of care and goals.    Anticipated barriers to physical therapy: none    Goals:   Short Term Goals: 3-4 weeks   Increase hip flexion  strength by 1/2 grade,  met  Increase hip Abd strength by 1/2 grade, progressing, not met  Increase left ankle EV/IV by 1/2 grade, progressing, not met  Increase sit to "  30 sec by 4 or more reps, progressing, not met  Decrease TUG by 2 secs or more,  met     Long Term Goals: 8 weeks   Able to ambulate on tuff field w/o assisted device x 40 feet safely, progressing, not met  Increase sit to  30 sec to 13 or more, progressing, not met  Able to stand narrow stance w/o sway, met  Able to ambulate with walking stick x 400 feet w/o break, progressing, not met  Increase LE strength to 4+/5 grossly, progressing, not met      Plan     Plan of care Certification: 8/4/2022 to 10/14/22.     Outpatient Physical Therapy 2 times weekly for 10 weeks to include the following interventions: Gait Training, Manual Therapy, Neuromuscular Re-ed, Patient Education, Self Care, Therapeutic Activities and Therapeutic Exercise      Sujit Urbina, PT

## 2022-08-30 ENCOUNTER — CLINICAL SUPPORT (OUTPATIENT)
Dept: REHABILITATION | Facility: HOSPITAL | Age: 67
End: 2022-08-30
Attending: PSYCHIATRY & NEUROLOGY
Payer: OTHER GOVERNMENT

## 2022-08-30 DIAGNOSIS — R26.9 GAIT DIFFICULTY: ICD-10-CM

## 2022-08-30 DIAGNOSIS — R29.898 LEG WEAKNESS, BILATERAL: Primary | ICD-10-CM

## 2022-08-30 PROCEDURE — 97112 NEUROMUSCULAR REEDUCATION: CPT | Mod: PO,CQ

## 2022-08-30 PROCEDURE — 97110 THERAPEUTIC EXERCISES: CPT | Mod: PO,CQ

## 2022-08-30 NOTE — PROGRESS NOTES
Physical Therapy Daily Treatment Note     Name: Mikey [facility] Number: [id number]    Therapy Diagnosis:   Encounter Diagnoses   Name Primary?    Leg weakness, bilateral Yes    Gait difficulty      Physician: Eloisa Frank,*    Visit Date: 2022   Physician Orders: PT Eval and Treat   Medical Diagnosis from Referral: E11.44 (ICD-10-CM) - Diabetic amyotrophy associated with type 2 diabetes mellitus      Evaluation Date: 3/28/2022  Authorization Period Expiration: 22  Plan of Care Expiration: 22  Visit # / Visits authorized:     Time In: 4:45 PM  Time Out: 5:35 PM  Total Billable Time: 50 minutes     Precautions: Standard, Diabetes and Fall      Subjective     Pt reports: that he has noticed some improvement having taken 2 weeks off. He hasn't had pain as often and it is less intense. .     He was not issued home exercise program.  Response to previous treatment: no adverse effects  Functional change: walking a little better, feeling stronger     Pain: 0/10  Location: N/A     Objective     Mikey received therapeutic exercises to develop strength, endurance, ROM, flexibility, core stabilization and Balance for 35 minutes including:     Recumbent bike seat 14  x  10 min for endurance  Mini Squats 15x in // bars  Leg press:  seat 11, 30lbs x 10, 40# x 15  6 inch step ups at stair case x 20 each leg  Heel raise 15x  Standing Hip ABD x 10 2#  Side lying hip abd x 10 each with and without Assistance-   HS Curl 20x: 2lbs   Marchinlbs: 20x   Hip ext 20x 2#  LAQ with 3 sec hold 20x, 2lbs  SLR with QS 10x: not performed  Bridging 20x - not performed  Hip ADD(30lbs) /Abd machine: - not performed  Clamshell 15x - not performed     Mikey participated in neuromuscular re-education activities to improve: Balance, Coordination, Kinesthetic, Sense and Proprioception for 10 minutes. The following activities were included:  NBOS 3 x 30 sec on foam : not performed  NBOS: with pertubations: 2 x 30 sec  "NP  NBOS looking right/left on foam: 3 x 30 sec  Walking holding on with right hand in parallel bars looking right/left 3x  Staggered stance looking right and left 2 x 30" ea   Full tandem x 30 sec       Home Exercises Provided and Patient Education Provided     Education provided:   - effects of therapy    Written Home Exercises Provided: Patient instructed to cont prior HEP.  Exercises were reviewed and Mikey was able to demonstrate them prior to the end of the session.  Mikey demonstrated good  understanding of the education provided.     See EMR under Patient Instructions for exercises provided prior visit.    Assessment     Mikey adryan today's tx with progression of ther ex well. Added resistance to hip ext w/o difficulty. He seems to have benefited from time off allowing for extended recovery time. He still has significant LE weakness in his hips and quads that make ambulating for longer distances difficult.   Mikey Is progressing well towards his goals.   Pt prognosis is Good.     Pt will continue to benefit from skilled outpatient physical therapy to address the deficits listed in the problem list box on initial evaluation, provide pt/family education and to maximize pt's level of independence in the home and community environment.     Pt's spiritual, cultural and educational needs considered and pt agreeable to plan of care and goals.    Anticipated barriers to physical therapy: none    Goals:   Short Term Goals: 3-4 weeks   Increase hip flexion  strength by 1/2 grade,  met  Increase hip Abd strength by 1/2 grade, progressing, not met  Increase left ankle EV/IV by 1/2 grade, progressing, not met  Increase sit to  30 sec by 4 or more reps, progressing, not met  Decrease TUG by 2 secs or more,  met     Long Term Goals: 8 weeks   Able to ambulate on [employer] field w/o assisted device x 40 feet safely, progressing, not met  Increase sit to  30 sec to 13 or more, progressing, not met  Able to stand narrow " stance w/o sway, met  Able to ambulate with walking stick x 400 feet w/o break, progressing, not met  Increase LE strength to 4+/5 grossly, progressing, not met      Plan     Plan of care Certification: 8/4/2022 to 10/14/22.     Outpatient Physical Therapy 2 times weekly for 10 weeks to include the following interventions: Gait Training, Manual Therapy, Neuromuscular Re-ed, Patient Education, Self Care, Therapeutic Activities and Therapeutic Exercise      Steve Coy, PTA

## 2022-08-31 ENCOUNTER — PATIENT MESSAGE (OUTPATIENT)
Dept: ADMINISTRATIVE | Facility: HOSPITAL | Age: 67
End: 2022-08-31
Payer: OTHER GOVERNMENT

## 2022-09-15 ENCOUNTER — PATIENT MESSAGE (OUTPATIENT)
Dept: FAMILY MEDICINE | Facility: CLINIC | Age: 67
End: 2022-09-15
Payer: MEDICARE

## 2022-10-17 ENCOUNTER — PATIENT MESSAGE (OUTPATIENT)
Dept: ADMINISTRATIVE | Facility: HOSPITAL | Age: 67
End: 2022-10-17
Payer: MEDICARE

## 2022-10-18 ENCOUNTER — TELEPHONE (OUTPATIENT)
Dept: NEUROLOGY | Facility: CLINIC | Age: 67
End: 2022-10-18
Payer: MEDICARE

## 2022-10-20 ENCOUNTER — TELEPHONE (OUTPATIENT)
Dept: NEUROLOGY | Facility: CLINIC | Age: 67
End: 2022-10-20
Payer: MEDICARE

## 2022-10-25 ENCOUNTER — OFFICE VISIT (OUTPATIENT)
Dept: FAMILY MEDICINE | Facility: CLINIC | Age: 67
End: 2022-10-25
Payer: MEDICARE

## 2022-10-25 ENCOUNTER — LAB VISIT (OUTPATIENT)
Dept: LAB | Facility: HOSPITAL | Age: 67
End: 2022-10-25
Attending: INTERNAL MEDICINE
Payer: MEDICARE

## 2022-10-25 VITALS
BODY MASS INDEX: 28.85 KG/M2 | HEIGHT: 70 IN | SYSTOLIC BLOOD PRESSURE: 132 MMHG | HEART RATE: 62 BPM | WEIGHT: 201.5 LBS | DIASTOLIC BLOOD PRESSURE: 80 MMHG | OXYGEN SATURATION: 97 %

## 2022-10-25 DIAGNOSIS — Z12.11 SCREENING FOR COLON CANCER: ICD-10-CM

## 2022-10-25 DIAGNOSIS — R80.9 TYPE 2 DIABETES MELLITUS WITH MICROALBUMINURIA, WITH LONG-TERM CURRENT USE OF INSULIN: ICD-10-CM

## 2022-10-25 DIAGNOSIS — E11.42 TYPE 2 DIABETES MELLITUS WITH DIABETIC POLYNEUROPATHY, WITH LONG-TERM CURRENT USE OF INSULIN: Primary | ICD-10-CM

## 2022-10-25 DIAGNOSIS — Z79.4 TYPE 2 DIABETES MELLITUS WITH MICROALBUMINURIA, WITH LONG-TERM CURRENT USE OF INSULIN: ICD-10-CM

## 2022-10-25 DIAGNOSIS — E11.44 DIABETIC AMYOTROPHY ASSOCIATED WITH TYPE 2 DIABETES MELLITUS: ICD-10-CM

## 2022-10-25 DIAGNOSIS — E11.42 TYPE 2 DIABETES MELLITUS WITH DIABETIC POLYNEUROPATHY, WITH LONG-TERM CURRENT USE OF INSULIN: ICD-10-CM

## 2022-10-25 DIAGNOSIS — E11.29 TYPE 2 DIABETES MELLITUS WITH MICROALBUMINURIA, WITH LONG-TERM CURRENT USE OF INSULIN: ICD-10-CM

## 2022-10-25 DIAGNOSIS — Z79.4 TYPE 2 DIABETES MELLITUS WITH DIABETIC POLYNEUROPATHY, WITH LONG-TERM CURRENT USE OF INSULIN: Primary | ICD-10-CM

## 2022-10-25 DIAGNOSIS — Z79.4 TYPE 2 DIABETES MELLITUS WITH DIABETIC POLYNEUROPATHY, WITH LONG-TERM CURRENT USE OF INSULIN: ICD-10-CM

## 2022-10-25 LAB
CK SERPL-CCNC: 587 U/L (ref 20–200)
ESTIMATED AVG GLUCOSE: 186 MG/DL (ref 68–131)
HBA1C MFR BLD: 8.1 % (ref 4–5.6)

## 2022-10-25 PROCEDURE — 99214 PR OFFICE/OUTPT VISIT, EST, LEVL IV, 30-39 MIN: ICD-10-PCS | Mod: S$PBB,,, | Performed by: INTERNAL MEDICINE

## 2022-10-25 PROCEDURE — 82550 ASSAY OF CK (CPK): CPT | Performed by: INTERNAL MEDICINE

## 2022-10-25 PROCEDURE — 83036 HEMOGLOBIN GLYCOSYLATED A1C: CPT | Performed by: INTERNAL MEDICINE

## 2022-10-25 PROCEDURE — 99999 PR PBB SHADOW E&M-EST. PATIENT-LVL IV: ICD-10-PCS | Mod: PBBFAC,,, | Performed by: INTERNAL MEDICINE

## 2022-10-25 PROCEDURE — 36415 COLL VENOUS BLD VENIPUNCTURE: CPT | Mod: PO | Performed by: INTERNAL MEDICINE

## 2022-10-25 PROCEDURE — 99214 OFFICE O/P EST MOD 30 MIN: CPT | Mod: S$PBB,,, | Performed by: INTERNAL MEDICINE

## 2022-10-25 PROCEDURE — 99999 PR PBB SHADOW E&M-EST. PATIENT-LVL IV: CPT | Mod: PBBFAC,,, | Performed by: INTERNAL MEDICINE

## 2022-10-25 PROCEDURE — 99214 OFFICE O/P EST MOD 30 MIN: CPT | Mod: PBBFAC,PO | Performed by: INTERNAL MEDICINE

## 2022-10-25 RX ORDER — FLASH GLUCOSE SCANNING READER
EACH MISCELLANEOUS
Qty: 4 EACH | Refills: 2 | Status: SHIPPED | OUTPATIENT
Start: 2022-10-25 | End: 2022-10-25 | Stop reason: SDUPTHER

## 2022-10-25 RX ORDER — FLASH GLUCOSE SCANNING READER
EACH MISCELLANEOUS
Qty: 4 EACH | Refills: 2 | Status: SHIPPED | OUTPATIENT
Start: 2022-10-25 | End: 2023-01-25 | Stop reason: SDUPTHER

## 2022-10-25 RX ORDER — GABAPENTIN 300 MG/1
300 CAPSULE ORAL 3 TIMES DAILY
Qty: 270 CAPSULE | Refills: 0 | Status: SHIPPED | OUTPATIENT
Start: 2022-10-25 | End: 2022-10-25 | Stop reason: SDUPTHER

## 2022-10-25 RX ORDER — FLASH GLUCOSE SENSOR
KIT MISCELLANEOUS
Qty: 1 KIT | Refills: 1 | Status: SHIPPED | OUTPATIENT
Start: 2022-10-25 | End: 2022-10-25 | Stop reason: SDUPTHER

## 2022-10-25 RX ORDER — GABAPENTIN 300 MG/1
300 CAPSULE ORAL 3 TIMES DAILY
Qty: 270 CAPSULE | Refills: 3 | Status: SHIPPED | OUTPATIENT
Start: 2022-10-25 | End: 2023-03-03 | Stop reason: SDUPTHER

## 2022-10-25 RX ORDER — INSULIN LISPRO 100 [IU]/ML
10 INJECTION, SOLUTION INTRAVENOUS; SUBCUTANEOUS
Qty: 27 ML | Refills: 3 | Status: SHIPPED | OUTPATIENT
Start: 2022-10-25 | End: 2023-03-03 | Stop reason: SDUPTHER

## 2022-10-25 RX ORDER — INSULIN GLARGINE 100 [IU]/ML
52 INJECTION, SOLUTION SUBCUTANEOUS DAILY
Qty: 48 ML | Refills: 1 | Status: SHIPPED | OUTPATIENT
Start: 2022-10-25 | End: 2023-06-20 | Stop reason: SDUPTHER

## 2022-10-25 RX ORDER — LISINOPRIL 20 MG/1
20 TABLET ORAL DAILY
Qty: 90 TABLET | Refills: 3 | Status: SHIPPED | OUTPATIENT
Start: 2022-10-25 | End: 2023-03-03 | Stop reason: SDUPTHER

## 2022-10-25 RX ORDER — LISINOPRIL 20 MG/1
20 TABLET ORAL DAILY
Qty: 90 TABLET | Refills: 0 | Status: SHIPPED | OUTPATIENT
Start: 2022-10-25 | End: 2022-10-25 | Stop reason: SDUPTHER

## 2022-10-25 RX ORDER — FLASH GLUCOSE SENSOR
KIT MISCELLANEOUS
Qty: 1 KIT | Refills: 1 | Status: SHIPPED | OUTPATIENT
Start: 2022-10-25 | End: 2023-01-25 | Stop reason: SDUPTHER

## 2022-10-25 NOTE — PROGRESS NOTES
"  Subjective     Mikey James is a 67 y.o. old, male here for Follow-up    67-year-old with PMH of Type 2 IDDM with neuropathy/retinopathy, HTN, HLD, diabetic amyotrophy.    Recently on medicare. Lots of hiccups.    Type 2 DM: FBG's controlled excellently on 52 u of lantus daily. Having lows with no hypoglycemic awareness.   Because he needs to check his glucose 4 times a day and as needed it would be very beneficial for him to have a continuous glucose monitor. He has also proven compliance with his insulin regimen. We will need to cover mealtime spikes for further glucose control.    Neuropathy is stable on gabapentin.  HTN controlled.  HLD: held statin therapy d/t elevated CK levels. Consider resuming carefully.  Diabetic amyotrophy: Lower extremity strength improved with PT. I think he will continue to improve and regain strength if he improves    Review of Systems   Constitutional: Negative.    Respiratory: Negative.     Cardiovascular: Negative.    Medications     Outpatient Medications Marked as Taking for the 10/25/22 encounter (Office Visit) with Branden Gallardo MD   Medication Sig Dispense Refill    aspirin (ECOTRIN) 81 MG EC tablet Take 81 mg by mouth once daily.      blood sugar diagnostic (FREESTYLE LITE STRIPS) Strp USE TO CHECK BLOOD GLUCOSE ONCE DAILY 200 strip 3    blood-glucose meter kit To check BG 1 times daily, to use with insurance preferred meter. ICD10: E11. 1 each 1    blood-glucose meter,continuous (DEXCOM G6 ) Misc Use as directed 1 each 1    lancets (FREESTYLE LANCETS) 28 gauge Misc TO CHECK BLOOD GLUCOSE ONCE DAILY 100 each 2    LUTEIN ORAL Take by mouth.      pen needle, diabetic 32 gauge x 5/16" Ndle 1 each by Misc.(Non-Drug; Combo Route) route as needed. 100 each 0    rosuvastatin (CRESTOR) 5 MG tablet Take 1 tablet (5 mg total) by mouth once daily. 90 tablet 3    [DISCONTINUED] flash glucose scanning reader (IntercomSTYLE COURTNEY 14 DAY READER) Misc As directed 4 each 2    " "[DISCONTINUED] flash glucose sensor (FREESTYLE COURTNEY 14 DAY SENSOR) Kit As directed 1 kit 1    [DISCONTINUED] gabapentin (NEURONTIN) 300 MG capsule Take 1 capsule (300 mg total) by mouth 3 (three) times daily. 270 capsule 3    [DISCONTINUED] insulin lispro (HUMALOG KWIKPEN INSULIN) 100 unit/mL pen Inject 10 Units into the skin 3 (three) times daily with meals. 27 mL 3    [DISCONTINUED] lisinopriL (PRINIVIL,ZESTRIL) 20 MG tablet Take 1 tablet (20 mg total) by mouth once daily. 90 tablet 3     Objective     /80   Pulse 62   Ht 5' 10" (1.778 m)   Wt 91.4 kg (201 lb 8 oz)   SpO2 97%   BMI 28.91 kg/m²   Physical Exam  Constitutional:       General: He is not in acute distress.     Appearance: Normal appearance. He is well-developed.   Neurological:      Mental Status: He is alert.     Assessment and Plan     Type 2 diabetes mellitus with diabetic polyneuropathy, with long-term current use of insulin  -     Hemoglobin A1C; Future; Expected date: 10/25/2022  -     Discontinue: gabapentin (NEURONTIN) 300 MG capsule; Take 1 capsule (300 mg total) by mouth 3 (three) times daily.  Dispense: 270 capsule; Refill: 0  -     Ambulatory referral/consult to Optometry; Future; Expected date: 11/01/2022  -     Discontinue: flash glucose scanning reader (FREESTYLE COURTNEY 14 DAY READER) Misc; As directed  Dispense: 4 each; Refill: 2  -     Discontinue: flash glucose sensor (FREESTYLE COURTNEY 14 DAY SENSOR) Kit; As directed  Dispense: 1 kit; Refill: 1  -     flash glucose scanning reader (FREESTYLE COURTNEY 14 DAY READER) Misc; As directed  Dispense: 4 each; Refill: 2  -     flash glucose sensor (FREESTYLE COURTNEY 14 DAY SENSOR) Kit; As directed  Dispense: 1 kit; Refill: 1  -     gabapentin (NEURONTIN) 300 MG capsule; Take 1 capsule (300 mg total) by mouth 3 (three) times daily.  Dispense: 270 capsule; Refill: 3  -     insulin (LANTUS SOLOSTAR U-100 INSULIN) glargine 100 units/mL SubQ pen; Inject 52 Units into the skin once daily.  " Dispense: 48 mL; Refill: 1  -     insulin lispro (HUMALOG KWIKPEN INSULIN) 100 unit/mL pen; Inject 10 Units into the skin 3 (three) times daily with meals.  Dispense: 27 mL; Refill: 3    Type 2 diabetes mellitus with microalbuminuria, with long-term current use of insulin  -     Hemoglobin A1C; Future; Expected date: 10/25/2022  -     Discontinue: lisinopriL (PRINIVIL,ZESTRIL) 20 MG tablet; Take 1 tablet (20 mg total) by mouth once daily.  Dispense: 90 tablet; Refill: 0  -     CK; Future; Expected date: 10/25/2022  -     lisinopriL (PRINIVIL,ZESTRIL) 20 MG tablet; Take 1 tablet (20 mg total) by mouth once daily.  Dispense: 90 tablet; Refill: 3    Screening for colon cancer  -     Fecal Immunochemical Test (iFOBT); Future; Expected date: 10/25/2022    Diabetic amyotrophy associated with type 2 diabetes mellitus  -     Ambulatory referral/consult to Physical/Occupational Therapy; Future; Expected date: 11/01/2022      Follow up in about 3 months (around 1/25/2023).  ___________________  Branden Gallardo MD  Internal Medicine and Pediatrics

## 2022-10-28 ENCOUNTER — PATIENT MESSAGE (OUTPATIENT)
Dept: FAMILY MEDICINE | Facility: CLINIC | Age: 67
End: 2022-10-28
Payer: MEDICARE

## 2022-11-09 ENCOUNTER — CLINICAL SUPPORT (OUTPATIENT)
Dept: REHABILITATION | Facility: HOSPITAL | Age: 67
End: 2022-11-09
Attending: PSYCHIATRY & NEUROLOGY
Payer: MEDICARE

## 2022-11-09 DIAGNOSIS — E11.44 DIABETIC AMYOTROPHY ASSOCIATED WITH TYPE 2 DIABETES MELLITUS: ICD-10-CM

## 2022-11-09 PROCEDURE — 97162 PT EVAL MOD COMPLEX 30 MIN: CPT | Mod: PO

## 2022-11-09 PROCEDURE — 97110 THERAPEUTIC EXERCISES: CPT | Mod: PO

## 2022-11-10 NOTE — PLAN OF CARE
[facility]  Physical Therapy Initial Evaluation    Name: Mikey Ramirez  Clinic Number: [id number]    Therapy Diagnosis:   Encounter Diagnosis   Name Primary?    Diabetic amyotrophy associated with type 2 diabetes mellitus      Physician: Branden Gallardo MD    Physician Orders: PT Eval and Treat   Medical Diagnosis from Referral: E11.44 (ICD-10-CM) - Diabetic amyotrophy associated with type 2 diabetes mellitus     Evaluation Date: 11/9/2022  Authorization Period Expiration: 10/25/22  Plan of Care Expiration: 01/04/22  Visit # / Visits authorized: 1/ 1    Time In: 1600  Time Out: 1655  Total Billable Time: 55 minutes    Precautions: Standard, Diabetes and Fall    Subjective   Date of onset: : a couple of years  History of current condition - Mikey reports: over the past 2 years he has had progressive LE weakness and loss of balance . His right side LE is stronger than left. He is using walking stick to get around. He did feel like he had benefit with the exercises he was performing earlier in the year. He is having difficultly with ambulating any distance now due to decreased LE coordination and strength. He tried to come off of Neurontin and had pain all over his body.          Past Medical History:   Diagnosis Date    GERD (gastroesophageal reflux disease)     High cholesterol     Hypertension     Type 2 diabetes mellitus     LBSL 140 today --- running high     Mikey Ramirez  has a past surgical history that includes YAG CAP (Bilateral, 2012) and Cataract extraction (Bilateral, 2003).    Mikey has a current medication list which includes the following prescription(s): aspirin, blood sugar diagnostic, blood-glucose meter, dexcom g6 , freestyle dolores 14 day reader, freestyle dolores 14 day sensor, gabapentin, insulin, insulin lispro, lancets, lisinopril, lutein, rosuvastatin, and sure comfort pen needle.    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  Needle EMG sampling of the bilateral lower extremities revealed abnormal findings in all muscles sampled.  Abnormal spontaneous activity was observed in all muscles.  Considering the diffuse nature of abnormalities, a diagnosis for a definitive neuro pathologic process cannot be made at this time.  Potential etiologies include motor neuron disease, bilateral lumbosacral plexopathy (including diabetic amyotrophy), and/or a combination of multilevel lumbosacral radiculopathy and severe peripheral polyneuropathy.  While motor unit analysis revealed findings more suggestive of a neuropathic type process, a myopathic process is also possibility, although lower on the differential. Correlation required to dictate further testing.      Prior Therapy: none  Social History:  lives with their spouse  Occupation:   Prior Level of Function: Independent   Current Level of Function: Modified Independent     Pain:  Current 0/10, worst 8/10, best 0/10   Location: bilateral Glute   Description: Aching and Dull  Aggravating Factors: Sitting and pressure on thigh/buttocks  Easing Factors:  changing position    Pts goals: increase his strength and balance so he can do more functionally     Objective   Mental status: oriented x3  Posture/ Alignment: Fair    GAIT DEVIATIONS: Mikey amb with decreased velocity of limb motion, decreased step length, decreased stride length, increased stride width and decreased weight-shifting ability.with walking stick and decreased balance, steppage type gait    Decreased standing balance with narrow stance with increased sway with Eyes open and closed, unable to  staggered stance    30 sec sit to stand test: 9  TU    Strength   Right LE Left LE   Hip flexion 3-/5 3-/5   Hip extension       Hip abduction 4-/5 4-/5   Hip adduction 4/5 4/5   Knee flexion 4+/5 4+/5   Knee extension 4/5 4/5        Peroneals 4/5 4-/5   Tibialis anterior 4+/5 4/5   Tibialis posterior 4+/5 4/5   Gatroc/soleus 4+/5 4+/5       Strength:      Right Left Comment   Shoulder flexion: 5/5 5/5    Shoulder Abduction: 5/5 5/5    Shoulder ER: 4-/5 4-/5    Shoulder IR: 5/5 5/5    Lower Trap: 4/5 4/5    Middle Trap: 4/5 4/5              Palpation: + TTP quads      Pt/family was provided educational information, including: role of PT, goals for PT, scheduling - pt verbalized understanding. Discussed insurance plan with pt.     CMS Impairment/Limitation/Restriction for FOTO LE Survey    Therapist reviewed FOTO scores for Mikey Ramirez on 2022.   FOTO documents entered into [employer] - see Media section.    Limitation Score: 60%           TREATMENT   Treatment Time In: 1635  Treatment Time Out: 1655  Total Treatment time separate from Evaluation: 20 minutes    Mikey received therapeutic exercises to develop strength, endurance, ROM, and flexibility for 20 minutes includin inch step ups x 10 each leg  Leg press 2 x 15: 20lbs, 30lbs   Standing hip Abd x 10 each  Standing knee flexion x 10  Sit to stand x 10      Home Exercises and Patient Education Provided    Education provided:   - progression of exercises  - may be a little sore    Written Home Exercises Provided:  Plan to give on 2nd-3rd visit after we see how he does with exercises .  Exercises were reviewed and Mikey was able to demonstrate them prior to the end of the session.  Mikey demonstrated good  understanding of the education provided.     See EMR under  N/A  for exercises provided  N/A .      Assessment   . Pt presents with worsening LE weakness and decreased ability to perform transfers , ambulation and weight bearing activities due to decreased LE strength and coordination.He has done fairly well previously with exercises for strengthening  and balance which did improve his  ability to perform ADLs and decrease his risk of falls. He did well with his exercises in clinic today with increased fatigue post exercises. He should do well with a progressive exercise program to increase strength, balance and overall function with ADLs.    Pt prognosis is Good.   Pt will benefit from skilled outpatient Physical Therapy to address the deficits stated above and in the chart below, provide pt/family education, and to maximize pt's level of independence.     Plan of care discussed with patient: Yes  Pt's spiritual, cultural and educational needs considered and patient is agreeable to the plan of care and goals as stated below:     Anticipated Barriers for therapy: none    Medical Necessity is demonstrated by the following  History  Co-morbidities and personal factors that may impact the plan of care Co-morbidities:   advanced age, diabetes, HTN and high cholesterol, GERD    Personal Factors:   coping style  lifestyle     moderate   Examination  Body Structures and Functions, activity limitations and participation restrictions that may impact the plan of care Body Regions:   lower extremities  upper extremities    Body Systems:    gross symmetry  ROM  strength  gross coordinated movement  balance  gait  transfers  transitions  motor control    Participation Restrictions:   Work timining    Activity limitations:   Learning and applying knowledge  no deficits    General Tasks and Commands  no deficits    Communication  no deficits    Mobility  lifting and carrying objects  walking  driving (bike, car, motorcycle)    Self care  dressing    Domestic Life  shopping  cooking  doing house work (cleaning house, washing dishes, laundry)    Interactions/Relationships  no deficits    Life Areas  employment    Community and Social Life  community life  recreation and leisure         moderate   Clinical Presentation evolving clinical presentation with changing clinical characteristics moderate   Decision Making/  Complexity Score: moderate     Goals:  Short Term Goals: 3-4 weeks   Increase hip flexion  strength by 1/2 grade  Increase hip Abd strength by 1/2 grade  Increase left ankle EV/IV by 1/2 grade  Increase sit to  30 sec by 4 or more reps  Decrease TUG by 2 secs or more    Long Term Goals: 8 weeks   Able to ambulate on tuff field w/o assisted device x 40 feet safely  Increase sit to  30 sec to 13 or more  Able to stand narrow stance w/o sway  Able to ambulate with walking stick x 400 feet w/o break  Increase LE strength to 4+/5 grossly      Plan   Plan of care Certification: 11/9/2022 to 01/04/22    Outpatient Physical Therapy 2 times weekly for 8 weeks to include the following interventions: Gait Training, Manual Therapy, Neuromuscular Re-ed, Patient Education, Self Care, Therapeutic Activities and Therapeutic Exercise.     Sujit Urbina, PT

## 2022-11-17 ENCOUNTER — TELEPHONE (OUTPATIENT)
Dept: NEUROLOGY | Facility: CLINIC | Age: 67
End: 2022-11-17
Payer: MEDICARE

## 2022-11-17 ENCOUNTER — CLINICAL SUPPORT (OUTPATIENT)
Dept: REHABILITATION | Facility: HOSPITAL | Age: 67
End: 2022-11-17
Attending: PSYCHIATRY & NEUROLOGY
Payer: MEDICARE

## 2022-11-17 DIAGNOSIS — R29.898 LEG WEAKNESS, BILATERAL: Primary | ICD-10-CM

## 2022-11-17 DIAGNOSIS — R26.9 GAIT DIFFICULTY: ICD-10-CM

## 2022-11-17 PROCEDURE — 97110 THERAPEUTIC EXERCISES: CPT | Mod: PO

## 2022-11-17 NOTE — TELEPHONE ENCOUNTER
----- Message from Parker Hernandez Patient Care Assistant sent at 11/17/2022 11:56 AM CST -----  Contact: Pt  Type: Return Call    Who Called: Pt  Who Left Message for Pt: Liyah  Does the patient know what this is regarding: Yes  Best Call Back Number: [phone]  Thank you~

## 2022-11-17 NOTE — PROGRESS NOTES
[facility]   Physical Therapy Treatment Note     Name: [facility] Number: [id number]    Therapy Diagnosis:   Encounter Diagnoses   Name Primary?    Leg weakness, bilateral Yes    Gait difficulty      Physician: Branden Gallardo MD    Visit Date: 11/17/2022    Physician Orders: PT Eval and Treat   Medical Diagnosis from Referral: E11.44 (ICD-10-CM) - Diabetic amyotrophy associated with type 2 diabetes mellitus      Evaluation Date: 11/9/2022  Authorization Period Expiration: 10/25/22  Plan of Care Expiration: 01/04/22  Visit # / Visits authorized: 1/ 1     Time In: 1645  Time Out: 1735  Total Billable Time: 50 minutes     Precautions: Standard, Diabetes and Fall    PTA Visit #: 0/5       SUBJECTIVE     Pt reports: he did fine after last visit. He continues to have  a lot of fatigue and difficulty with walking any distance due to weakness and fatigue in his legs. He hasn't been having any pain recently as long as he takes his gabapentin. He has neurology appt coming up to evaluate his high creatine levels.      .  He was compliant with home exercise program.  Response to previous treatment: did fine w/o increased pain/sorness  Functional change: none to date    Pain: 0/10  Location: bilateral LE      OBJECTIVE     Objective Measures updated at progress report unless specified.     Treatment     Mikey received the treatments listed below:      therapeutic exercises to develop strength, endurance, posture, and core stabilization for 50 minutes including:    Bike x 7 min to help strength/endurance of quads/hamstrings  6 inch step ups x 10 each leg  Leg press 2 x 15: 20lbs, 30lbs   Standing hip Abd x 15 each  Standing knee flexion x 15  Sit to stand x 10  LAQ x 10: 3lbs  Seated: hip Add: ball squeezes x 15  Standing: marching x 15  Side lying hip Abd x 10    neuromuscular re-education activities to improve: Balance, Coordination, Kinesthetic, Sense, and Proprioception for 0 minutes.  The following activities were included:              Patient Education and Home Exercises     Home Exercises Provided and Patient Education Provided     Education provided:   - progression of exercises    Written Home Exercises Provided:  will be given as I get a better sense of what his recovery needs are .   Mikey demonstrated good  understanding of the education provided. See EMR under Patient Instructions for exercises provided during therapy sessions    ASSESSMENT     Mikey did well with his exercises today with  expected fatigue and weakness. He is deconditioned from when he was last seen in PT. He has had chronically high creatine levelers along with difficulty with his blood sugar levels which can both have an affect of his muscles. I will plan on working on his strength /endurance in clinic and figure out his best HEP over the next few weeks. I am hopeful his other providers can figure out what is causing his high creatine levels and get that under control which should help him improve.     Mikey Is progressing well towards his goals.   Pt prognosis is Fair.     Pt will continue to benefit from skilled outpatient physical therapy to address the deficits listed in the problem list box on initial evaluation, provide pt/family education and to maximize pt's level of independence in the home and community environment.     Pt's spiritual, cultural and educational needs considered and pt agreeable to plan of care and goals.     Anticipated barriers to physical therapy: none      Goals:  Short Term Goals: 3-4 weeks   Increase hip flexion  strength by 1/2 grade  Increase hip Abd strength by 1/2 grade  Increase left ankle EV/IV by 1/2 grade  Increase sit to  30 sec by 4 or more reps  Decrease TUG by 2 secs or more     Long Term Goals: 8 weeks   Able to ambulate on tuff field w/o assisted device x 40 feet safely  Increase sit to  30 sec to 13 or more  Able to stand narrow stance w/o sway  Able to ambulate  with walking stick x 400 feet w/o break  Increase LE strength to 4+/5 grossly        Plan   Plan of care Certification: 11/9/2022 to 01/04/22     Outpatient Physical Therapy 2 times weekly for 8 weeks to include the following interventions: Gait Training, Manual Therapy, Neuromuscular Re-ed, Patient Education, Self Care, Therapeutic Activities and Therapeutic Exercise.         Sujit Urbina, PT

## 2022-11-18 ENCOUNTER — TELEPHONE (OUTPATIENT)
Dept: NEUROLOGY | Facility: CLINIC | Age: 67
End: 2022-11-18
Payer: MEDICARE

## 2022-11-28 NOTE — PROGRESS NOTES
[facility]   Physical Therapy Treatment Note     Name: [facility] [place] Number: [id number]    Therapy Diagnosis:   Encounter Diagnoses   Name Primary?    Leg weakness, bilateral Yes    Gait difficulty        Physician: Branden Gallardo MD    Visit Date: 11/29/2022    Physician Orders: PT Eval and Treat   Medical Diagnosis from Referral: E11.44 (ICD-10-CM) - Diabetic amyotrophy associated with type 2 diabetes mellitus      Evaluation Date: 11/9/2022  Authorization Period Expiration: 10/25/22  Plan of Care Expiration: 01/04/22  Visit # / Visits authorized: 26/49     Time In: 1645  Time Out: 1740  Total Billable Time: 55 minutes     Precautions: Standard, Diabetes and Fall    PTA Visit #: 0/5       SUBJECTIVE     Pt reports: he feels like he is getting weaker and it is getting more diffult to do weight bearing activities due to fatigue in his legs/muscles. He had a fall trying to get in his car when his legs buckled and he was able to catch himself but struggled to get back into the car . He felt fine after last visit w/o any soreness or pain in his legs just the overall difficulty with walking and balance seems to be getting worse.      .  He was compliant with home exercise program.  Response to previous treatment: did fine w/o increased pain/sorness  Functional change: none to date    Pain: 0/10  Location: bilateral LE      OBJECTIVE     Objective Measures updated at progress report unless specified.     Treatment     Mikey received the treatments listed below:      therapeutic exercises to develop strength, endurance, posture, and core stabilization for 55 minutes including:    Bike x 7 min to help strength/endurance of quads/hamstrings  6 inch step ups x 10 each leg  Leg press  30lbs  x 10, 2 x 10: 40lbs  Standing hip Abd x 15 each  Standing knee flexion x 15  Sit to stand x 10  LAQ x 10: 3lbs  Seated: hip Add: ball squeezes x 15  Standing: marching x 15  Side lying hip Abd x  10    neuromuscular re-education activities to improve: Balance, Coordination, Kinesthetic, Sense, and Proprioception for 0 minutes. The following activities were included:              Patient Education and Home Exercises     Home Exercises Provided and Patient Education Provided     Education provided:   - progression of exercises    Written Home Exercises Provided:  will be given as I get a better sense of what his recovery needs are .   Mikey demonstrated good  understanding of the education provided. See EMR under Patient Instructions for exercises provided during therapy sessions    ASSESSMENT     Mikey had more difficulty with exercises today and did seem to fatigue a little quicker than previous treatments which may be due to deconditioning but also may be due to his ongoing high creatine levels. He will see neurology next week to see what they think is going on and if there is something that can be done. We will dsee how he does after his visit today and may need to decrease some of the weight /reps with his exercises as he was more fatigued with difficulty walking to car after visit than I would like to see.     Mikey Is progressing well towards his goals.   Pt prognosis is Fair.     Pt will continue to benefit from skilled outpatient physical therapy to address the deficits listed in the problem list box on initial evaluation, provide pt/family education and to maximize pt's level of independence in the home and community environment.     Pt's spiritual, cultural and educational needs considered and pt agreeable to plan of care and goals.     Anticipated barriers to physical therapy: none      Goals:  Short Term Goals: 3-4 weeks   Increase hip flexion  strength by 1/2 grade  Increase hip Abd strength by 1/2 grade  Increase left ankle EV/IV by 1/2 grade  Increase sit to  30 sec by 4 or more reps  Decrease TUG by 2 secs or more     Long Term Goals: 8 weeks   Able to ambulate on tuff field w/o assisted  device x 40 feet safely  Increase sit to  30 sec to 13 or more  Able to stand narrow stance w/o sway  Able to ambulate with walking stick x 400 feet w/o break  Increase LE strength to 4+/5 grossly        Plan   Plan of care Certification: 11/9/2022 to 01/04/22     Outpatient Physical Therapy 2 times weekly for 8 weeks to include the following interventions: Gait Training, Manual Therapy, Neuromuscular Re-ed, Patient Education, Self Care, Therapeutic Activities and Therapeutic Exercise.         Sujit Urbina, PT

## 2022-11-29 ENCOUNTER — CLINICAL SUPPORT (OUTPATIENT)
Dept: REHABILITATION | Facility: HOSPITAL | Age: 67
End: 2022-11-29
Attending: PSYCHIATRY & NEUROLOGY
Payer: MEDICARE

## 2022-11-29 DIAGNOSIS — R26.9 GAIT DIFFICULTY: ICD-10-CM

## 2022-11-29 DIAGNOSIS — R29.898 LEG WEAKNESS, BILATERAL: Primary | ICD-10-CM

## 2022-11-29 PROCEDURE — 97110 THERAPEUTIC EXERCISES: CPT | Mod: PO

## 2022-12-02 ENCOUNTER — CLINICAL SUPPORT (OUTPATIENT)
Dept: REHABILITATION | Facility: HOSPITAL | Age: 67
End: 2022-12-02
Attending: PSYCHIATRY & NEUROLOGY
Payer: MEDICARE

## 2022-12-02 DIAGNOSIS — R26.9 GAIT DIFFICULTY: ICD-10-CM

## 2022-12-02 DIAGNOSIS — R29.898 LEG WEAKNESS, BILATERAL: Primary | ICD-10-CM

## 2022-12-02 PROCEDURE — 97110 THERAPEUTIC EXERCISES: CPT | Mod: PO,CQ

## 2022-12-02 NOTE — PROGRESS NOTES
[facility]   Physical Therapy Treatment Note     Name: [facility] Number: [id number]    Therapy Diagnosis:   Encounter Diagnoses   Name Primary?    Leg weakness, bilateral Yes    Gait difficulty        Physician: Branden Gallardo MD    Visit Date: 12/2/2022    Physician Orders: PT Eval and Treat   Medical Diagnosis from Referral: E11.44 (ICD-10-CM) - Diabetic amyotrophy associated with type 2 diabetes mellitus      Evaluation Date: 11/9/2022  Authorization Period Expiration: 10/25/22  Plan of Care Expiration: 01/04/22  Visit # / Visits authorized: 27/49     Time In: 4:23  Time Out: 5:21  Total Billable Time: 58 minutes     Precautions: Standard, Diabetes and Fall    PTA Visit #: 0/5       SUBJECTIVE     Pt reports: that he feels that he has lost the progress that he made while in therapy last time. He states that he can not amb w/o the cane anymore and that his adryan to distance is greatly reduced. He reports that he was very fatigued  the evening and day post last tx. He feels that it takes him longer to recover from his sessions..      .  He was compliant with home exercise program.  Response to previous treatment: did fine w/o increased pain/sorness  Functional change: none to date    Pain: 0/10  Location: bilateral LE      OBJECTIVE     Objective Measures updated at progress report unless specified.     Treatment     Mikey received the treatments listed below:      therapeutic exercises to develop strength, endurance, posture, and core stabilization for 58 minutes including:    Bike x 7 min to help strength/endurance of quads/hamstrings  6 inch step ups x 10 each leg  Leg press  30lbs 1x  15x  , 1x 8x  Standing hip Abd x 15 each  Standing knee flexion x 15  Sit to stand x 10  LAQ x 10: 3lbs  Seated: hip Add: ball squeezes x 15  Standing: marching x 15  Standing hip Abd x 10    neuromuscular re-education activities to improve: Balance, Coordination, Kinesthetic, Sense, and  Proprioception for 0 minutes. The following activities were included:              Patient Education and Home Exercises     Home Exercises Provided and Patient Education Provided     Education provided:   - progression of exercises    Written Home Exercises Provided:  will be given as I get a better sense of what his recovery needs are .   Mikey demonstrated good  understanding of the education provided. See EMR under Patient Instructions for exercises provided during therapy sessions    ASSESSMENT     Mikey adryan today's tx with ther ex well despite having to take frequent rest breaks to complete sets. He deferred SL hip ABD and opted for standing due to not wanting to exert the energy of getting on/off mat.  He will see neurology next week to see what they think is going on and if there is something that can be done. We will dsee how he does after his visit today and may need to continue to modify his exercises as he was more fatigued after visit      Mikey Is progressing well towards his goals.   Pt prognosis is Fair.     Pt will continue to benefit from skilled outpatient physical therapy to address the deficits listed in the problem list box on initial evaluation, provide pt/family education and to maximize pt's level of independence in the home and community environment.     Pt's spiritual, cultural and educational needs considered and pt agreeable to plan of care and goals.     Anticipated barriers to physical therapy: none      Goals:  Short Term Goals: 3-4 weeks   Increase hip flexion  strength by 1/2 grade  Increase hip Abd strength by 1/2 grade  Increase left ankle EV/IV by 1/2 grade  Increase sit to  30 sec by 4 or more reps  Decrease TUG by 2 secs or more     Long Term Goals: 8 weeks   Able to ambulate on tuff field w/o assisted device x 40 feet safely  Increase sit to  30 sec to 13 or more  Able to stand narrow stance w/o sway  Able to ambulate with walking stick x 400 feet w/o break  Increase  LE strength to 4+/5 grossly        Plan   Plan of care Certification: 11/9/2022 to 01/04/22     Outpatient Physical Therapy 2 times weekly for 8 weeks to include the following interventions: Gait Training, Manual Therapy, Neuromuscular Re-ed, Patient Education, Self Care, Therapeutic Activities and Therapeutic Exercise.         Steve Coy, PTA

## 2022-12-05 ENCOUNTER — LAB VISIT (OUTPATIENT)
Dept: LAB | Facility: HOSPITAL | Age: 67
End: 2022-12-05
Attending: NURSE PRACTITIONER
Payer: MEDICARE

## 2022-12-05 ENCOUNTER — OFFICE VISIT (OUTPATIENT)
Dept: NEUROLOGY | Facility: CLINIC | Age: 67
End: 2022-12-05
Payer: MEDICARE

## 2022-12-05 VITALS
WEIGHT: 205.25 LBS | HEIGHT: 70 IN | HEART RATE: 57 BPM | DIASTOLIC BLOOD PRESSURE: 71 MMHG | SYSTOLIC BLOOD PRESSURE: 165 MMHG | BODY MASS INDEX: 29.38 KG/M2

## 2022-12-05 DIAGNOSIS — E11.44 DIABETIC AMYOTROPHY ASSOCIATED WITH TYPE 2 DIABETES MELLITUS: ICD-10-CM

## 2022-12-05 DIAGNOSIS — Z79.4 TYPE 2 DIABETES MELLITUS WITH DIABETIC POLYNEUROPATHY, WITH LONG-TERM CURRENT USE OF INSULIN: ICD-10-CM

## 2022-12-05 DIAGNOSIS — R74.8 ELEVATED CK: ICD-10-CM

## 2022-12-05 DIAGNOSIS — R74.8 ELEVATED CK: Primary | ICD-10-CM

## 2022-12-05 DIAGNOSIS — E11.42 TYPE 2 DIABETES MELLITUS WITH DIABETIC POLYNEUROPATHY, WITH LONG-TERM CURRENT USE OF INSULIN: ICD-10-CM

## 2022-12-05 DIAGNOSIS — R29.898 LEG WEAKNESS, BILATERAL: ICD-10-CM

## 2022-12-05 PROCEDURE — 99214 PR OFFICE/OUTPT VISIT, EST, LEVL IV, 30-39 MIN: ICD-10-PCS | Mod: S$PBB,,, | Performed by: NURSE PRACTITIONER

## 2022-12-05 PROCEDURE — 99214 OFFICE O/P EST MOD 30 MIN: CPT | Mod: S$PBB,,, | Performed by: NURSE PRACTITIONER

## 2022-12-05 PROCEDURE — 80053 COMPREHEN METABOLIC PANEL: CPT | Performed by: NURSE PRACTITIONER

## 2022-12-05 PROCEDURE — 99999 PR PBB SHADOW E&M-EST. PATIENT-LVL III: CPT | Mod: PBBFAC,,, | Performed by: NURSE PRACTITIONER

## 2022-12-05 PROCEDURE — 99999 PR PBB SHADOW E&M-EST. PATIENT-LVL III: ICD-10-PCS | Mod: PBBFAC,,, | Performed by: NURSE PRACTITIONER

## 2022-12-05 PROCEDURE — 82085 ASSAY OF ALDOLASE: CPT | Performed by: NURSE PRACTITIONER

## 2022-12-05 PROCEDURE — 99213 OFFICE O/P EST LOW 20 MIN: CPT | Mod: PBBFAC,PO | Performed by: NURSE PRACTITIONER

## 2022-12-05 PROCEDURE — 86140 C-REACTIVE PROTEIN: CPT | Performed by: NURSE PRACTITIONER

## 2022-12-05 PROCEDURE — 36415 COLL VENOUS BLD VENIPUNCTURE: CPT | Mod: PO | Performed by: NURSE PRACTITIONER

## 2022-12-05 PROCEDURE — 85651 RBC SED RATE NONAUTOMATED: CPT | Mod: PO | Performed by: NURSE PRACTITIONER

## 2022-12-05 NOTE — PATIENT INSTRUCTIONS
Start taking alpha lipoic acid 600 mg per day. This can help slow progression of diabetic neuropathy.  Note that this can reduce blood sugars, so you may need to monitor these more closely when first starting this supplement.    Continue the vitamin B12 supplement.     Continue gabapentin for symptom control.     We will see how you do over the next 1-2 months now that you have resumed PT. If the strength doesn't improve or worsens, we will need to consider repeating the EMG test.     We will get some further lab work to evaluate the mildly elevated CK levels. Continue not to take the cholesterol medication for now.     Try wearing compression stockings during the day when you are not able to be active to help with the swelling.     Follow up in about 3-4 months with Dr. Frank

## 2022-12-05 NOTE — PROGRESS NOTES
NEUROLOGY  Outpatient Follow Up Visit     [facility]  [address] [facility], [place] [address]  [phone] (office) / [phone] (fax)    Patient Name:  Mikey Ramirez  :  1955  MR #:  [id number]  Acct #:  [id number]    Date of  Visit: 2022    Other Physicians:  Branden Gallardo MD (Primary Care Physician); No ref. provider found (Referring)      CHIEF COMPLAINT: Follow-up (Muscle weakness /)      INTERVAL HISTORY:  22:  iMkey Ramirez is a 67 y.o.  male seen in follow up for diabetic amyotrophy and diabetic peripheral neuropathy.     Medical history is otherwise significant for DM2, GERD, HLD, HTN    Here with his wife.     I haven't seen him since May. He was doing PT and felt that he was regaining some strength at that time. His insurance changed shortly after and he had to stop PT for 2 months. He resumed therapy last week and will be going 2x per week.     He tires easily. Feels like his muscles get stiff and tight. No myalgia. He has to take frequent rest breaks. Using cane. No falls. Has to use his arms to stand from a chair. Has 3 steps to get into his home, but no other stairs. No significant change in arm or finger strength. Has some trouble opening bottles or jars. No proximal arm weakness. No dysphagia.     Same tingling in his feet and fingertips. Well controlled on gabapentin. Took a break from Rx for a few days and noted recurrence and pain. Taking 300 mg TID.     Stopped rosuvastatin about 1 month ago due to persistently elevated CK.     Last A1C was 8.1 (from 8.9) about 1 month ago. PCP wants him to start SSI regimen, but he is waiting to see if he can get approved for a CBG monitor. Not seeing endocrine.     PRIOR HISTORY:  22:  Mikey Ramirez is a 67 y.o.  male seen in follow up for diabetic amyotrophy and peripheral neuropathy. The patient is new to me today, but recently established with Dr. Frank    Medical history is otherwise significant for DM2, GERD,  "HLD, HTN    Here with his wife.     He is doing PT. He feels that his leg strength has improved a bit. Still notes significant weakness in the upper legs as prior (difficulty rising from chair or standing up from floor). 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     Still very sensitive to touch in his upper thighs. Pain is tolerable on gabapentin. He is taking 300 mg BID or TID without notable SE.     Still feels same sense of imbalance. Mainly has trouble if the texture of the ground changes or with any change in slope. He has not had any falls. Using the cane helps him feel more stable.     Still has same numbness in his feet and tingling in his fingertips. No appreciable weakness in the hands or UE.     He has upcoming repeat labs to check his A1C. Monitoring BG daily and notes improvement. Hopeful that his A1C continues to decrease. Managed by his PCP.     HPI 3/15/22 - Zac:  "Mikey Ramirez is a 66 y.o. male referred by Dr. Burkett for consultation regarding an abnormal EMG and weakness.     Patient states he has a history of diabetes.  He let it go for a year or more.  He states he lost a significant amount of weight and started to become very weak early 2021.  He doesn't recall any significant groin or hip pain at that time.  Prior to this he had no lower extremity symptoms.  He states he had no numbness and tingling.  He can't specifically identify the timing of the weakness and the numbness 2 years ago, ie which came first.  His wife mentions that family saw him a few months before this and commented that he looked ill and was getting very thin (thought he had cancer).     He also started to notice significant loss of strength in the lower extremities from the waist down.  He was started on his medications for diabetes after getting re-established with a PCP.  He started gaining weight and getting his glucose under control.     His strength is not improving.  He feels " "unsteady on his feet, particularly right after he stands up.  If he stops, he has trouble starting again as well.  He is currently using a cane.  He states the imbalance and unsteadiness is resolved just by touching something.  He states he can move his legs, but standing is very problematic.  He has numbness in his feet, but not up the legs.     He states his gait is sloppy.  He can't get up a single step without using handrails.  He can't get up from the ground when he plays with his dogs.   He has to lift his legs up into bed or in the car.  He has to use his arms to stand from a chair.  He can't get in and out of the boat to go fishing.  He is very careful and is wary of falling.  He has not fallen.  He is not tripping.  He feels very unsteady on uneven ground.     He denies any issues in the upper extremities.  He has tingling in the fingers.  They feel cold.       He has no muscle cramps.  He has no spasms.       He has tremor in his upper limbs, the left arm more than the right.       He has no changes in his speech.       He has a prostate history, but on medication he denies any bladder issues.     He has no bowel issues currently.  The constipation issues he was having have resolved after changing his pain medications.       He has pain that comes and goes in the muscles.  He has extreme sensitivity to the skin of his thighs.       He has no history of cancer or chemotherapy. He does drink ETOH, but rarely.     He has no family history of any neurological diseases.       Due to the pain, he borrowed his wife's gabapentin to help.  He couldn't sleep due to the pain.  The gabapentin helped."     Allergies:  Review of patient's allergies indicates:   Allergen Reactions    No known allergies        Current Medications:  Current Outpatient Medications   Medication Sig Dispense Refill    aspirin (ECOTRIN) 81 MG EC tablet Take 81 mg by mouth once daily.      blood sugar diagnostic (FREESTYLE LITE STRIPS) Strp USE " "TO CHECK BLOOD GLUCOSE ONCE DAILY 200 strip 3    blood-glucose meter kit To check BG 1 times daily, to use with insurance preferred meter. ICD10: E11. 1 each 1    blood-glucose meter,continuous (DEXCOM G6 ) Misc Use as directed 1 each 1    flash glucose scanning reader (FREESTYLE COURTNEY 14 DAY READER) Misc As directed 4 each 2    flash glucose sensor (FREESTYLE COURTNEY 14 DAY SENSOR) Kit As directed 1 kit 1    gabapentin (NEURONTIN) 300 MG capsule Take 1 capsule (300 mg total) by mouth 3 (three) times daily. 270 capsule 3    insulin (LANTUS SOLOSTAR U-100 INSULIN) glargine 100 units/mL SubQ pen Inject 52 Units into the skin once daily. 48 mL 1    insulin lispro (HUMALOG KWIKPEN INSULIN) 100 unit/mL pen Inject 10 Units into the skin 3 (three) times daily with meals. 27 mL 3    lancets (FREESTYLE LANCETS) 28 gauge Misc TO CHECK BLOOD GLUCOSE ONCE DAILY 100 each 2    lisinopriL (PRINIVIL,ZESTRIL) 20 MG tablet Take 1 tablet (20 mg total) by mouth once daily. 90 tablet 3    LUTEIN ORAL Take by mouth.      SURE COMFORT PEN NEEDLE 32 gauge x 1/4" Ndle USE 1 PEN NEEDLE AS NEEDED 100 each 3     No current facility-administered medications for this visit.       Past Medical History:  Past Medical History:   Diagnosis Date    GERD (gastroesophageal reflux disease)     High cholesterol     Hypertension     Type 2 diabetes mellitus     LBSL 140 today --- running high       Past Surgical History:  Past Surgical History:   Procedure Laterality Date    CATARACT EXTRACTION Bilateral 2003    [place]    YAG CAP Bilateral 2012       Family History:  family history includes Diabetes in his mother.    Social History:   reports that he has never smoked. He has never used smokeless tobacco. He reports current alcohol use. He reports that he does not use drugs.      PHYSICAL EXAM:  BP (!) 165/71 (BP Method: X-Large (Automatic))   Pulse (!) 57   Ht 5' 10" (1.778 m)   Wt 93.1 kg (205 lb 4 oz)   BMI 29.45 kg/m²     General: Well " groomed. NAD.  Pulmonary: Normal effort and rate.   Musculoskeletal: No obvious joint deformities. Able to MOORE.   Extremities: No clubbing, cyanosis. 2+  BLE edema.     Neurological exam:  Mental status: Awake and alert.  Oriented to person, place, time and situation. Fund of knowledge normal.  Speech/Language: Fluent and appropriate. No dysarthria or aphasia.   Cranial nerves (II-XII): Extraocular movements intact, no ptosis, no nystagmus. Facial sensation and symmetry intact bilaterally. Hearing grossly intact. Palate deferred. Shoulder shrug normal bilaterally. Normal tongue protrusion.     GROSS MOTOR:  Gait & station: No steppage today. Able to ambulate without cane, but feels more steady with it. Cautious.   Tone: Normal  Abnormal movements: BUE action tremor, bit worse on the L.   FNF: normal   Rapid alternating movements & drift: normal     MUSCLE STRENGTH:      Fascics Atrophy RIGHT      LEFT Atrophy Fascics       5 Neck Ext. 5           5 Neck Flex 5           5 Deltoids 5           5 Sh.Ext.Rot. 5           5 Sh.Int.Rot. 5           5 Biceps 5           5 Triceps 5           5 Forearm.Pr. 5           5 Wrist Ext. 5           5 Wrist Flex 5           5- Finger Ext. 5-           5 Finger Flex 5           5 FPL 5           5- Inteross. 5-                                           3+ Iliopsoas 3+           5 Hip Abduct 5           5 Hip Adduct 5           5 Quads 5           5 Hams 5           4+ Dorsiflex 4           5 Plantar Flex 5-           5 Ankle Clovis 4           5 Ankle Invert 5-           5 Toe Ext. 5           5 Toe Flex 5                                          REFLEXES:     RIGHT Reflex    LEFT   0 Biceps 0   0 Brachiorad. 0   0 Triceps 0    Pectoralis     Jaw Jerk     Webb's            1 Patellar 1   0 Ankle 0    Suprapatellar                    Down PLANTAR Down           SENSORY:  Light touch: Normal throughout.  Sharp touch: decreased to pinprick in stocking pattern to mid foot  bilaterally.   Vibration: Decreased in toes bilaterally, normal at L ankle, normal at R knee, UE normal       DIAGNOSTIC DATA:  I have personally reviewed provider notes, labs and imaging made available to me today.     EMG with Dr. 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  Needle EMG sampling of the muscles throughout the bilateral upper extremities showed findings of active axonal denervation and/or chronic neurogenic change.  Findings were similar to the previous EMG study from 01/24/2022.  Abnormal findings also include the cervical, lumbar, and thoracic paraspinal musculature.  Large duration motor units and reduced recruitment were observed in several muscles during needle EMG sampling of the upper extremities, which is indicative of a neuropathic type process.     EMG with Dr. 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  Needle EMG sampling of the bilateral lower extremities revealed abnormal findings in all muscles sampled.  Abnormal spontaneous activity was observed in all muscles.  Considering the diffuse nature of abnormalities, a diagnosis for a definitive neuro pathologic process cannot be made at this time.  Potential etiologies include motor neuron disease, bilateral lumbosacral plexopathy (including diabetic amyotrophy), and/or a combination of multilevel lumbosacral radiculopathy and severe peripheral polyneuropathy.  While motor unit analysis revealed findings more suggestive of a neuropathic type process, a myopathic process is also possibility, although lower on the differential. Correlation required to dictate further testing.    Labs:  CBC:   Lab Results   Component Value Date    WBC 5.15 01/05/2022    HGB 12.3 (L) 01/05/2022    HCT 36.7 (L) 01/05/2022      01/05/2022    MCV 87 01/05/2022    RDW 13.1 01/05/2022     BMP:   Lab Results   Component Value Date     01/05/2022    K 4.4 01/05/2022     01/05/2022    CO2 23 01/05/2022    BUN 17 01/05/2022    CREATININE 0.6 01/05/2022     (H) 01/05/2022    CALCIUM 9.4 01/05/2022     LFTS;   Lab Results   Component Value Date    PROT 5.9 (L) 01/05/2022    ALBUMIN 3.3 (L) 01/05/2022    BILITOT 0.4 01/05/2022    AST 37 01/05/2022    ALKPHOS 66 01/05/2022    ALT 39 01/05/2022     COAGS: No results found for: INR, PROTIME, PTT  FLP:   Lab Results   Component Value Date    CHOL 131 01/05/2022    HDL 52 01/05/2022    LDLCALC 65.2 01/05/2022    TRIG 69 01/05/2022    CHOLHDL 39.7 01/05/2022     Component      Latest Ref Rng & Units 1/5/2022 9/22/2021 6/10/2021 2/26/2018   Hemoglobin A1C External      4.0 - 5.6 % 10.1 (H) 10.7 (H) >14.0 (H) 11.3 (H)   Estimated Avg Glucose      68 - 131 mg/dL 243 (H) 260 (H) Unable to calculate 278 (H)   Vitamin B-12      210 - 950 pg/mL   >2000 (H)    TSH      0.400 - 4.000 uIU/mL 1.491      CPK      20 - 200 U/L 474 (H)        Component      Latest Ref Rng & Units 5/19/2016   Hemoglobin A1C External      4.0 - 5.6 % 9.6 (H)   Estimated Avg Glucose      68 - 131 mg/dL 229 (H)   Vitamin B-12      210 - 950 pg/mL    TSH      0.400 - 4.000 uIU/mL    CPK      20 - 200 U/L      Component      Latest Ref Rng & Units 10/25/2022 7/6/2022   Hemoglobin A1C External      4.0 - 5.6 % 8.1 (H) 8.9 (H)   Estimated Avg Glucose      68 - 131 mg/dL 186 (H) 209 (H)   CPK      20 - 200 U/L 587 (H) 736 (H)       ASSESSMENT & PLAN:  Mikey Ramirez is a 67 y.o.  male seen in follow up for diabetic amyotrophy and peripheral neuropathy.     Problem List Items Addressed This Visit          Neuro    Diabetic amyotrophy    Current Assessment & Plan     Continue PT, gabapentin for symptoms   Continue working with PCP regarding glycemic control             Endocrine    Type 2 diabetes mellitus with diabetic  polyneuropathy, with long-term current use of insulin       Orthopedic    Leg weakness, bilateral    Current Assessment & Plan     No significant change in exam from last visit  Diabetic neuropathy is not the cause of the proximal weakness. This was presumed to be bilateral diabetic amyotrophy, which can take 1-2 years to resolve. Given that he had to stop PT for 2 months, suspect that progress has stalled for that reason.   CK trends noted -- seems to have peaked in 700s. Check further serologies for evaluation. Stay off statin.   Need to consider repeating EMG if no progress with persistent therapy in next couple of months -- discussed with pt             Other    Elevated CK - Primary         Follow up: 3-4 months with Dr. Frank    I spent a total of 35 minutes on the day of the visit.    This includes face to face time with the patient, as well as non-face to face time preparing for and completing the visit (review of prior diagnostic testing and clinical notes, obtaining or reviewing history, documenting clinical information in the EMR, independently interpreting and communicating results to the patient/family and coordinating ongoing care).               I appreciate the opportunity to participate in the care of this patient. Please feel free to contact me with any questions or concerns.       Caity Hammer, [facility]  [facility]  [address] [facility]  [place] [address]

## 2022-12-05 NOTE — ASSESSMENT & PLAN NOTE
No significant change in exam from last visit  Diabetic neuropathy is not the cause of the proximal weakness. This was presumed to be bilateral diabetic amyotrophy, which can take 1-2 years to resolve. Given that he had to stop PT for 2 months, suspect that progress has stalled for that reason.   CK trends noted -- seems to have peaked in 700s. Check further serologies for evaluation. Stay off statin.   Need to consider repeating EMG if no progress with persistent therapy in next couple of months -- discussed with pt

## 2022-12-06 ENCOUNTER — CLINICAL SUPPORT (OUTPATIENT)
Dept: REHABILITATION | Facility: HOSPITAL | Age: 67
End: 2022-12-06
Attending: PSYCHIATRY & NEUROLOGY
Payer: MEDICARE

## 2022-12-06 DIAGNOSIS — R26.9 GAIT DIFFICULTY: ICD-10-CM

## 2022-12-06 DIAGNOSIS — R29.898 LEG WEAKNESS, BILATERAL: Primary | ICD-10-CM

## 2022-12-06 LAB
ALBUMIN SERPL BCP-MCNC: 3 G/DL (ref 3.5–5.2)
ALP SERPL-CCNC: 86 U/L (ref 55–135)
ALT SERPL W/O P-5'-P-CCNC: 43 U/L (ref 10–44)
ANION GAP SERPL CALC-SCNC: 7 MMOL/L (ref 8–16)
AST SERPL-CCNC: 37 U/L (ref 10–40)
BILIRUB SERPL-MCNC: 0.5 MG/DL (ref 0.1–1)
BUN SERPL-MCNC: 8 MG/DL (ref 8–23)
CALCIUM SERPL-MCNC: 9.3 MG/DL (ref 8.7–10.5)
CHLORIDE SERPL-SCNC: 105 MMOL/L (ref 95–110)
CO2 SERPL-SCNC: 26 MMOL/L (ref 23–29)
CREAT SERPL-MCNC: 0.6 MG/DL (ref 0.5–1.4)
CRP SERPL-MCNC: 1.2 MG/L (ref 0–8.2)
ERYTHROCYTE [SEDIMENTATION RATE] IN BLOOD BY PHOTOMETRIC METHOD: 4 MM/HR (ref 0–23)
EST. GFR  (NO RACE VARIABLE): >60 ML/MIN/1.73 M^2
GLUCOSE SERPL-MCNC: 202 MG/DL (ref 70–110)
POTASSIUM SERPL-SCNC: 5.3 MMOL/L (ref 3.5–5.1)
PROT SERPL-MCNC: 5.8 G/DL (ref 6–8.4)
SODIUM SERPL-SCNC: 138 MMOL/L (ref 136–145)

## 2022-12-06 PROCEDURE — 97110 THERAPEUTIC EXERCISES: CPT | Mod: PO

## 2022-12-06 NOTE — PROGRESS NOTES
[facility]   Physical Therapy Treatment Note     Name: Mikey [facility] Number: [id number]    Therapy Diagnosis:   Encounter Diagnoses   Name Primary?    Leg weakness, bilateral Yes    Gait difficulty          Physician: Branden Gallardo MD    Visit Date: 12/6/2022    Physician Orders: PT Eval and Treat   Medical Diagnosis from Referral: E11.44 (ICD-10-CM) - Diabetic amyotrophy associated with type 2 diabetes mellitus      Evaluation Date: 11/9/2022  Authorization Period Expiration: 12/31/22  Plan of Care Expiration: 01/04/22  Visit # / Visits authorized: 29/49     Time In: 1645  Time Out: 1730  Total Billable Time: 50 minutes     Precautions: Standard, Diabetes and Fall    PTA Visit #: 0/5       SUBJECTIVE     Pt reports: he is feeling about the same today with being overall fatigued and LE weakness. He di ok after his last visit but did have some soreness after the last time I saw him. He had appt with neurology and they ordered another EMG to see if there are any changes but weren't to worried about his creatine levels.      .  He was compliant with home exercise program.  Response to previous treatment: did fine w/o increased pain/sorness  Functional change: none to date    Pain: 0/10  Location: bilateral LE      OBJECTIVE     Objective Measures updated at progress report unless specified.     Treatment     Mikey received the treatments listed below:      therapeutic exercises to develop strength, endurance, posture, and core stabilization for 50 minutes including:    Bike x 7 min to help strength/endurance of quads/hamstrings  6 inch step ups x 10 each leg  Leg press  30lbs 1x  15x  , 1x 8x  Standing hip Abd x 15 each  Standing knee flexion x 15  Sit to stand x 10: not performed  Standing elbow flexion: 4lbs x 15  LAQ x 10: 3lbs  Seated: hip Add: ball squeezes x 15  Standing: marching x 15  Standing hip Abd x 10  Seated rows: red therabnd  Seated shoulder press: 4lbs : x 15    Seated: triceps presses : x 20, blue theraband     neuromuscular re-education activities to improve: Balance, Coordination, Kinesthetic, Sense, and Proprioception for 0 minutes. The following activities were included:      Patient Education and Home Exercises     Home Exercises Provided and Patient Education Provided     Education provided:   - progression of exercises    Written Home Exercises Provided:  will be given as I get a better sense of what his recovery needs are .   Mikey demonstrated good  understanding of the education provided. See EMR under Patient Instructions for exercises provided during therapy sessions    ASSESSMENT     Mikey did better with his exercises today but was quickly fatigued as was expected. I did start him on some UE exercises today which he did well with them. He should continue to improve with exercises to help his overall mobility.     Mikey Is progressing well towards his goals.   Pt prognosis is Fair.     Pt will continue to benefit from skilled outpatient physical therapy to address the deficits listed in the problem list box on initial evaluation, provide pt/family education and to maximize pt's level of independence in the home and community environment.     Pt's spiritual, cultural and educational needs considered and pt agreeable to plan of care and goals.     Anticipated barriers to physical therapy: none      Goals:  Short Term Goals: 3-4 weeks   Increase hip flexion  strength by 1/2 grade  Increase hip Abd strength by 1/2 grade  Increase left ankle EV/IV by 1/2 grade  Increase sit to  30 sec by 4 or more reps  Decrease TUG by 2 secs or more     Long Term Goals: 8 weeks   Able to ambulate on tuff field w/o assisted device x 40 feet safely  Increase sit to  30 sec to 13 or more  Able to stand narrow stance w/o sway  Able to ambulate with walking stick x 400 feet w/o break  Increase LE strength to 4+/5 grossly        Plan   Plan of care Certification: 11/9/2022  to 01/04/22     Outpatient Physical Therapy 2 times weekly for 8 weeks to include the following interventions: Gait Training, Manual Therapy, Neuromuscular Re-ed, Patient Education, Self Care, Therapeutic Activities and Therapeutic Exercise.         Sujit Urbina, PT

## 2022-12-07 LAB — HMGCR IGG SER IA-ACNC: <20 CU

## 2022-12-09 ENCOUNTER — CLINICAL SUPPORT (OUTPATIENT)
Dept: REHABILITATION | Facility: HOSPITAL | Age: 67
End: 2022-12-09
Attending: PSYCHIATRY & NEUROLOGY
Payer: MEDICARE

## 2022-12-09 DIAGNOSIS — R26.9 GAIT DIFFICULTY: ICD-10-CM

## 2022-12-09 DIAGNOSIS — R29.898 LEG WEAKNESS, BILATERAL: Primary | ICD-10-CM

## 2022-12-09 PROCEDURE — 97110 THERAPEUTIC EXERCISES: CPT | Mod: PO,CQ

## 2022-12-09 NOTE — PROGRESS NOTES
"  [facility]   Physical Therapy Treatment Note     Name: Mikey [place]  Clinic Number: [id number]    Therapy Diagnosis:   Encounter Diagnoses   Name Primary?    Leg weakness, bilateral Yes    Gait difficulty          Physician: Branden Gallardo MD    Visit Date: 12/9/2022    Physician Orders: PT Eval and Treat   Medical Diagnosis from Referral: E11.44 (ICD-10-CM) - Diabetic amyotrophy associated with type 2 diabetes mellitus      Evaluation Date: 11/9/2022  Authorization Period Expiration: 12/31/22  Plan of Care Expiration: 01/04/22  Visit # / Visits authorized: 29/49     Time In: 4:30  Time Out: 5:20  Total Billable Time: 50 minutes     Precautions: Standard, Diabetes and Fall    PTA Visit #: 0/5       SUBJECTIVE     Pt reports: no new complaint today. He states that "it was tuff that evening" post last tx with some soreness the next morning but he was fine by the afternoon.      .  He was compliant with home exercise program.  Response to previous treatment: did fine w/o increased pain/sorness  Functional change: none to date    Pain: 0/10  Location: bilateral LE      OBJECTIVE     Objective Measures updated at progress report unless specified.     Treatment     Mikey received the treatments listed below:      therapeutic exercises to develop strength, endurance, posture, and core stabilization for 50 minutes including:    Bike x 8 min to help strength/endurance of quads/hamstrings  6 inch step ups x 10 each leg  Leg press  30lbs 2x 10 1x 9  ,   Standing hip Abd x 15 each  Standing knee flexion x 15  Sit to stand x 10:   Standing elbow flexion: 4lbs x 20  LAQ x 10: 3lbs  Seated: hip Add: ball squeezes x 20  Standing: marching x 15  Standing hip Abd x 10  Seated rows: red therabnd 20x  Seated shoulder press: 4lbs : x 15   Seated: triceps presses : x 20, blue theraband     neuromuscular re-education activities to improve: Balance, Coordination, Kinesthetic, Sense, and Proprioception for 0 " minutes. The following activities were included:      Patient Education and Home Exercises     Home Exercises Provided and Patient Education Provided     Education provided:   - progression of exercises    Written Home Exercises Provided:  will be given as I get a better sense of what his recovery needs are .   Mikey demonstrated good  understanding of the education provided. See EMR under Patient Instructions for exercises provided during therapy sessions    ASSESSMENT     Mikey did better with his exercises today and was able to progress with reps with several exs. He continues to  fatigue easily .He required VCs for proper sequence with sit to stand activity to encourage proper wt shift.  He should continue to improve with exercises to help his overall mobility.     Mikey Is progressing well towards his goals.   Pt prognosis is Fair.     Pt will continue to benefit from skilled outpatient physical therapy to address the deficits listed in the problem list box on initial evaluation, provide pt/family education and to maximize pt's level of independence in the home and community environment.     Pt's spiritual, cultural and educational needs considered and pt agreeable to plan of care and goals.     Anticipated barriers to physical therapy: none      Goals:  Short Term Goals: 3-4 weeks   Increase hip flexion  strength by 1/2 grade  Increase hip Abd strength by 1/2 grade  Increase left ankle EV/IV by 1/2 grade  Increase sit to  30 sec by 4 or more reps  Decrease TUG by 2 secs or more     Long Term Goals: 8 weeks   Able to ambulate on tuff field w/o assisted device x 40 feet safely  Increase sit to  30 sec to 13 or more  Able to stand narrow stance w/o sway  Able to ambulate with walking stick x 400 feet w/o break  Increase LE strength to 4+/5 grossly        Plan   Plan of care Certification: 11/9/2022 to 01/04/22     Outpatient Physical Therapy 2 times weekly for 8 weeks to include the following  interventions: Gait Training, Manual Therapy, Neuromuscular Re-ed, Patient Education, Self Care, Therapeutic Activities and Therapeutic Exercise.         Steve Coy, PTA

## 2022-12-10 LAB — ALDOLASE SERPL-CCNC: 19.4 U/L (ref 1.2–7.6)

## 2022-12-13 ENCOUNTER — CLINICAL SUPPORT (OUTPATIENT)
Dept: REHABILITATION | Facility: HOSPITAL | Age: 67
End: 2022-12-13
Attending: PSYCHIATRY & NEUROLOGY
Payer: MEDICARE

## 2022-12-13 DIAGNOSIS — R26.9 GAIT DIFFICULTY: ICD-10-CM

## 2022-12-13 DIAGNOSIS — R29.898 LEG WEAKNESS, BILATERAL: Primary | ICD-10-CM

## 2022-12-13 PROCEDURE — 97110 THERAPEUTIC EXERCISES: CPT | Mod: PO

## 2022-12-13 NOTE — PROGRESS NOTES
[facility]   Physical Therapy Treatment Note     Name: Mikey Villas  Clinic Number: [id number]    Therapy Diagnosis:   Encounter Diagnoses   Name Primary?    Leg weakness, bilateral Yes    Gait difficulty        Physician: Eloisa Frank,*    Visit Date: 12/13/2022    Physician Orders: PT Eval and Treat   Medical Diagnosis from Referral: E11.44 (ICD-10-CM) - Diabetic amyotrophy associated with type 2 diabetes mellitus      Evaluation Date: 11/9/2022  Authorization Period Expiration: 12/31/22  Plan of Care Expiration: 01/04/22  Visit # / Visits authorized: 31/49     Time In: 1645  Time Out: 1740  Total Billable Time: 55 minutes     Precautions: Standard, Diabetes and Fall    PTA Visit #: 0/5       SUBJECTIVE     Pt reports: he is feeling a little better with some small improvements with his balance and endurance.He did ok after last visit.      .  He was compliant with home exercise program.  Response to previous treatment: did fine w/o increased pain/sorness  Functional change: none to date    Pain: 0/10  Location: bilateral LE      OBJECTIVE     Objective Measures updated at progress report unless specified.     Treatment     Mikey received the treatments listed below:      therapeutic exercises to develop strength, endurance, posture, and core stabilization for 55 minutes including:    Bike x 8 min to help strength/endurance of quads/hamstrings  6 inch step ups x 10 each leg  Leg press  20lbs x 10, 30lbs : 2 x 10  ,   Standing hip Abd x 15 each  Standing knee flexion x 15  Sit to stand x 10:   Standing elbow flexion: 4lbs x 20  LAQ x 10: 3lbs  Seated: hip Add: ball squeezes x 20  Standing: marching x 15  Standing hip Abd x 10  Seated rows: red therabnd 20x  Seated shoulder press: 4lbs : x 15   Seated: triceps presses : x 20, blue theraband     neuromuscular re-education activities to improve: Balance, Coordination, Kinesthetic, Sense, and Proprioception for 0 minutes. The following  activities were included:      Patient Education and Home Exercises     Home Exercises Provided and Patient Education Provided     Education provided:   - progression of exercises    Written Home Exercises Provided:  will be given as I get a better sense of what his recovery needs are .   Mikey demonstrated good  understanding of the education provided. See EMR under Patient Instructions for exercises provided during therapy sessions    ASSESSMENT     Mikey is slowly getting better over the last few visits. He is still demonstrating significant hip Abd weakness. He was doing better with his balance today with ambulation in the clinic.    Mikey Is progressing well towards his goals.   Pt prognosis is Fair.     Pt will continue to benefit from skilled outpatient physical therapy to address the deficits listed in the problem list box on initial evaluation, provide pt/family education and to maximize pt's level of independence in the home and community environment.     Pt's spiritual, cultural and educational needs considered and pt agreeable to plan of care and goals.     Anticipated barriers to physical therapy: none      Goals:  Short Term Goals: 3-4 weeks   Increase hip flexion  strength by 1/2 grade  Increase hip Abd strength by 1/2 grade  Increase left ankle EV/IV by 1/2 grade  Increase sit to  30 sec by 4 or more reps  Decrease TUG by 2 secs or more     Long Term Goals: 8 weeks   Able to ambulate on tuff field w/o assisted device x 40 feet safely  Increase sit to  30 sec to 13 or more  Able to stand narrow stance w/o sway  Able to ambulate with walking stick x 400 feet w/o break  Increase LE strength to 4+/5 grossly        Plan   Plan of care Certification: 11/9/2022 to 01/04/22     Outpatient Physical Therapy 2 times weekly for 8 weeks to include the following interventions: Gait Training, Manual Therapy, Neuromuscular Re-ed, Patient Education, Self Care, Therapeutic Activities and Therapeutic  Exercise.         Sujit Urbina, PT

## 2022-12-15 ENCOUNTER — CLINICAL SUPPORT (OUTPATIENT)
Dept: REHABILITATION | Facility: HOSPITAL | Age: 67
End: 2022-12-15
Attending: PSYCHIATRY & NEUROLOGY
Payer: MEDICARE

## 2022-12-15 DIAGNOSIS — R26.9 GAIT DIFFICULTY: ICD-10-CM

## 2022-12-15 DIAGNOSIS — R29.898 LEG WEAKNESS, BILATERAL: Primary | ICD-10-CM

## 2022-12-15 PROCEDURE — 97110 THERAPEUTIC EXERCISES: CPT | Mod: PO,CQ

## 2022-12-15 NOTE — PROGRESS NOTES
"  [facility]   Physical Therapy Treatment Note     Name: [facility] Number: [id number]    Therapy Diagnosis:   Encounter Diagnoses   Name Primary?    Leg weakness, bilateral Yes    Gait difficulty        Physician: Eloisa Frank,*    Visit Date: 12/15/2022    Physician Orders: PT Eval and Treat   Medical Diagnosis from Referral: E11.44 (ICD-10-CM) - Diabetic amyotrophy associated with type 2 diabetes mellitus      Evaluation Date: 11/9/2022  Authorization Period Expiration: 12/31/22  Plan of Care Expiration: 01/04/22  Visit # / Visits authorized: 32/49     Time In: 4:15  Time Out: 5:00  Total Billable Time: 45 minutes     Precautions: Standard, Diabetes and Fall    PTA Visit #: 0/5       SUBJECTIVE     Pt reports: that he is feeling very frustrated due to his lack of progress and the fact that his adryan to amb continues to decrease. He reports B LE stiffness and fatigue upon arrival and requests to "take it easy and just do the simple things today'.      .  He was compliant with home exercise program.  Response to previous treatment: did fine w/o increased pain/sorness  Functional change: none to date    Pain: 0/10  Location: bilateral LE      OBJECTIVE     Objective Measures updated at progress report unless specified.     Treatment     Mikey received the treatments listed below:      therapeutic exercises to develop strength, endurance, posture, and core stabilization for 55 minutes including:    Bike x 5 min to help strength/endurance of quads/hamstrings  6 inch step ups x 10 each leg  Leg press  20lbs x 10, 30lbs : 2 x 10  , Not performed  Standing hip Abd x 15 each  Standing knee flexion x 15  Sit to stand x 10: not performed  Standing elbow flexion: 4lbs x 20  LAQ x 10: 3lbs  Seated: hip Add: ball squeezes x 20 not performed   Standing: marching x 15  Standing hip Abd x 10  Seated rows: blue theraband 20x  Seated shoulder press: 4lbs : x 15   Seated: triceps presses : x " 20, blue theraband     neuromuscular re-education activities to improve: Balance, Coordination, Kinesthetic, Sense, and Proprioception for 0 minutes. The following activities were included:      Patient Education and Home Exercises     Home Exercises Provided and Patient Education Provided     Education provided:   - progression of exercises    Written Home Exercises Provided:  will be given as I get a better sense of what his recovery needs are .   Mikey demonstrated good  understanding of the education provided. See EMR under Patient Instructions for exercises provided during therapy sessions    ASSESSMENT     Mikey adryan today's tx with ther ex fair today due to continued reports of fatigue and weakness. He required frequent rest breaks. He did not perform all exs on this date per his request. . He is still demonstrating significant  weakness.     Mikey Is progressing poorly towards his goals.   Pt prognosis is Fair.     Pt will continue to benefit from skilled outpatient physical therapy to address the deficits listed in the problem list box on initial evaluation, provide pt/family education and to maximize pt's level of independence in the home and community environment.     Pt's spiritual, cultural and educational needs considered and pt agreeable to plan of care and goals.     Anticipated barriers to physical therapy: none      Goals:  Short Term Goals: 3-4 weeks   Increase hip flexion  strength by 1/2 grade  Increase hip Abd strength by 1/2 grade  Increase left ankle EV/IV by 1/2 grade  Increase sit to  30 sec by 4 or more reps  Decrease TUG by 2 secs or more     Long Term Goals: 8 weeks   Able to ambulate on tuff field w/o assisted device x 40 feet safely  Increase sit to  30 sec to 13 or more  Able to stand narrow stance w/o sway  Able to ambulate with walking stick x 400 feet w/o break  Increase LE strength to 4+/5 grossly        Plan   Plan of care Certification: 11/9/2022 to 01/04/22      Outpatient Physical Therapy 2 times weekly for 8 weeks to include the following interventions: Gait Training, Manual Therapy, Neuromuscular Re-ed, Patient Education, Self Care, Therapeutic Activities and Therapeutic Exercise.         Steve Coy, PTA

## 2022-12-20 ENCOUNTER — CLINICAL SUPPORT (OUTPATIENT)
Dept: REHABILITATION | Facility: HOSPITAL | Age: 67
End: 2022-12-20
Attending: PSYCHIATRY & NEUROLOGY
Payer: MEDICARE

## 2022-12-20 DIAGNOSIS — R26.9 GAIT DIFFICULTY: ICD-10-CM

## 2022-12-20 DIAGNOSIS — R29.898 LEG WEAKNESS, BILATERAL: Primary | ICD-10-CM

## 2022-12-20 PROCEDURE — 97110 THERAPEUTIC EXERCISES: CPT | Mod: PO,CQ

## 2022-12-20 NOTE — PROGRESS NOTES
[facility]   Physical Therapy Treatment Note     [facility]   Physical Therapy Treatment Note     Name: Mikey [place]  Clinic Number: [id number]    Therapy Diagnosis:   Encounter Diagnoses   Name Primary?    Leg weakness, bilateral Yes    Gait difficulty        Physician: Eloisa Frank,*    Visit Date: 12/20/2022    Physician Orders: PT Eval and Treat   Medical Diagnosis from Referral: E11.44 (ICD-10-CM) - Diabetic amyotrophy associated with type 2 diabetes mellitus      Evaluation Date: 11/9/2022  Authorization Period Expiration: 12/31/22  Plan of Care Expiration: 01/04/22  Visit # / Visits authorized: 33/49     Time In: 4:45  Time Out: 5:30  Total Billable Time: 45 minutes     Precautions: Standard, Diabetes and Fall    P    SUBJECTIVE     Pt reports: that he is w/o any pn today but continues to feel very frustrated due to his lack of adryan to amb .      .  He was compliant with home exercise program.  Response to previous treatment: did fine w/o increased pain/sorness  Functional change: none to date    Pain: 0/10  Location: bilateral LE      OBJECTIVE     Objective Measures updated at progress report unless specified.     Treatment     Mikey received the treatments listed below:      therapeutic exercises to develop strength, endurance, posture, and core stabilization for 55 minutes including:    Bike x 5 min to help strength/endurance of quads/hamstrings  6 inch step ups x 10 each leg  Leg press , 30lbs : x10  , 20# x15  Standing hip Abd x 15 each  Standing knee flexion x 15  Sit to stand x 10: not performed  Steated elbow flexion: 5lbs x 20  LAQ x 30: 3lbs  Seated: hip Add: ball squeezes x 20   Standing: marching x 15  Standing hip Abd x 20  Seated rows: blue theraband 20x no performed  Seated shoulder press: 5bs : x 15   Seated: triceps presses : x 20, blue theraband     neuromuscular re-education activities to improve: Balance, Coordination,  Kinesthetic, Sense, and Proprioception for 0 minutes. The following activities were included:      Patient Education and Home Exercises     Home Exercises Provided and Patient Education Provided     Education provided:   - progression of exercises    Written Home Exercises Provided:  will be given as I get a better sense of what his recovery needs are .   Mikey demonstrated good  understanding of the education provided. See EMR under Patient Instructions for exercises provided during therapy sessions    ASSESSMENT     Mikey adryan today's tx with progression of ther ex well today . He requested to increase wts with UE exs and performed more reps with LE ex today. He required fewer rest breaks today.  He demonstrated improved adryan to exs .     Mikey Is progressing fair towards his goals.   Pt prognosis is Fair.     Pt will continue to benefit from skilled outpatient physical therapy to address the deficits listed in the problem list box on initial evaluation, provide pt/family education and to maximize pt's level of independence in the home and community environment.     Pt's spiritual, cultural and educational needs considered and pt agreeable to plan of care and goals.     Anticipated barriers to physical therapy: none      Goals:  Short Term Goals: 3-4 weeks   Increase hip flexion  strength by 1/2 grade  Increase hip Abd strength by 1/2 grade  Increase left ankle EV/IV by 1/2 grade  Increase sit to  30 sec by 4 or more reps  Decrease TUG by 2 secs or more     Long Term Goals: 8 weeks   Able to ambulate on tuff field w/o assisted device x 40 feet safely  Increase sit to  30 sec to 13 or more  Able to stand narrow stance w/o sway  Able to ambulate with walking stick x 400 feet w/o break  Increase LE strength to 4+/5 grossly        Plan   Plan of care Certification: 11/9/2022 to 01/04/22     Outpatient Physical Therapy 2 times weekly for 8 weeks to include the following interventions: Gait Training, Manual  Therapy, Neuromuscular Re-ed, Patient Education, Self Care, Therapeutic Activities and Therapeutic Exercise.         Steve Coy, PTA

## 2022-12-27 ENCOUNTER — CLINICAL SUPPORT (OUTPATIENT)
Dept: REHABILITATION | Facility: HOSPITAL | Age: 67
End: 2022-12-27
Attending: PSYCHIATRY & NEUROLOGY
Payer: MEDICARE

## 2022-12-27 DIAGNOSIS — R29.898 LEG WEAKNESS, BILATERAL: Primary | ICD-10-CM

## 2022-12-27 DIAGNOSIS — R26.9 GAIT DIFFICULTY: ICD-10-CM

## 2022-12-27 PROCEDURE — 97110 THERAPEUTIC EXERCISES: CPT | Mod: PO

## 2022-12-27 NOTE — PLAN OF CARE
[facility]  Physical Therapy Initial Evaluation    Name: Mikey Ramirez  Clinic Number: [id number]    Therapy Diagnosis:   Encounter Diagnoses   Name Primary?    Leg weakness, bilateral Yes    Gait difficulty        Physician: Eloisa Frank,*    Physician Orders: PT Eval and Treat   Medical Diagnosis from Referral: E11.44 (ICD-10-CM) - Diabetic amyotrophy associated with type 2 diabetes mellitus     Evaluation Date: 12/27/2022  Authorization Period Expiration: 10/25/22  Plan of Care Expiration: 01/04/22  Visit # / Visits authorized: 1/ 1    Time In: 1650  Time Out: 1730  Total Billable Time: 40 minutes    Precautions: Standard, Diabetes and Fall    Subjective   Date of onset: : a couple of years  History of current condition - Mikey reports: over the past 2 years he has had progressive LE weakness and loss of balance . His right side LE is stronger than left. He is using walking stick to get around. He did feel like he had benefit with the exercises he was performing earlier in the year. He is having difficultly with ambulating any distance now due to decreased LE coordination and strength. He tried to come off of Neurontin and had pain all over his body.      12/27/22: Patient reports: He continues to feel with walking that he gets to a point and his legs are so fatigued he can't go anymore. It feels different than when he is doing his exercises in the clinic. He feels the exercises are helpful but is frustrated about his difficulty with walking and mobility. He hasn't been having any pain since starting gabapentin again.         Past Medical History:   Diagnosis Date    GERD (gastroesophageal reflux disease)     High cholesterol     Hypertension     Type 2 diabetes mellitus     LBSL 140 today --- running high     Mikey Ramirez  has a past surgical history that includes YAG CAP (Bilateral, 2012) and Cataract extraction (Bilateral, 2003).    Mikey has a current medication list which includes  the following prescription(s): aspirin, blood sugar diagnostic, blood-glucose meter, dexcom g6 , freestyle dolores 14 day reader, freestyle dolores 14 day sensor, gabapentin, insulin, insulin lispro, lancets, lisinopril, lutein, and sure comfort pen needle.    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  Needle EMG sampling of the bilateral lower extremities revealed abnormal findings in all muscles sampled.  Abnormal spontaneous activity was observed in all muscles.  Considering the diffuse nature of abnormalities, a diagnosis for a definitive neuro pathologic process cannot be made at this time.  Potential etiologies include motor neuron disease, bilateral lumbosacral plexopathy (including diabetic amyotrophy), and/or a combination of multilevel lumbosacral radiculopathy and severe peripheral polyneuropathy.  While motor unit analysis revealed findings more suggestive of a neuropathic type process, a myopathic process is also possibility, although lower on the differential. Correlation required to dictate further testing.      Prior Therapy: none  Social History:  lives with their spouse  Occupation:   Prior Level of Function: Independent   Current Level of Function: Modified Independent     Pain:  Current 0/10, worst 2/10, best 0/10   Location: bilateral Glute   Description: Aching and Dull  Aggravating Factors: Sitting and pressure on thigh/buttocks  Easing Factors:  changing position    Pts goals: increase his strength and balance so he can do more functionally     Objective   Mental status: oriented x3  Posture/ Alignment: Fair    GAIT DEVIATIONS: Mikey amb with decreased velocity of limb motion, decreased step length, decreased stride length, increased stride  width and decreased weight-shifting ability.with walking stick and decreased balance, steppage type gait    Decreased standing balance with narrow stance with increased sway with Eyes open and closed, unable to  staggered stance    30 sec sit to stand test: 9  TU    Strength   Right LE Left LE   Hip flexion 3-/5 3-/5   Hip extension      Hip abduction 3/+5 3+/5   Hip adduction 4+/5 4/+5   Knee flexion 4+/5 4+/5   Knee extension 4+/5 4+/5        Peroneals 4/5 4-/5   Tibialis anterior 4+/5 4/5   Tibialis posterior 4+/5 4/5   Gatroc/soleus 4+/5 4+/5       Strength:      Right Left Comment   Shoulder flexion: 5/5 5/5    Shoulder Abduction: 5/5 5/5    Shoulder ER: 4-/5 4-/5    Shoulder IR: 5/5 5/5    Lower Trap: 4/5 4/5    Middle Trap: 4/5 4/5              Palpation: + TTP quads      Pt/family was provided educational information, including: role of PT, goals for PT, scheduling - pt verbalized understanding. Discussed insurance plan with pt.     CMS Impairment/Limitation/Restriction for FOTO LE Survey    Therapist reviewed FOTO scores for Mikey Ramirez on 2022.   FOTO documents entered into [employer] - see Media section.    Limitation Score: not collected this visit           TREATMENT   Treatment Time In: 0  Treatment Time Out: 1730  Total Treatment time separate from Evaluation: 40 minutes    Mikey received therapeutic exercises to develop strength, endurance, ROM, and flexibility for 40 minutes including:    Bike x 5 min to help strength/endurance of quads/hamstrings  6 inch step ups x 10 each leg  Leg press , 30lbs : 2 x10   Standing hip Abd x 15 each  Standing knee flexion x 15  Sit to stand x 5:   Steated elbow flexion: 5lbs x 20  LAQ x 20: 5lbs  Seated: hip Add: ball squeezes x 20   Standing: marching x 15  Standing hip Abd x 20  Seated rows: blue theraband 20x no performed  Seated shoulder press: 5bs : x 15   Seated: triceps presses : x 20, blue theraband     Home Exercises and Patient Education  Provided    Education provided:   - progression of exercises  - start treadmill walking next visit    Written Home Exercises Provided:  Plan to give on 2nd-3rd visit after we see how he does with exercises .  Exercises were reviewed and Mikey was able to demonstrate them prior to the end of the session.  Mikey demonstrated good  understanding of the education provided.     See EMR under  N/A  for exercises provided  N/A .      Assessment   . Pt is doing better with his exercises in the clinic and is able to do more with improved endurance and strength but is still having a lot of diffculty with walking over a 100 feet with LE fatigue. We discussed that I wanted to try some treadmill walking next visit along with his exercises to see how he does with it and see if working specifically on walking will be helpful.     Pt prognosis is Good.   Pt will benefit from skilled outpatient Physical Therapy to address the deficits stated above and in the chart below, provide pt/family education, and to maximize pt's level of independence.     Plan of care discussed with patient: Yes  Pt's spiritual, cultural and educational needs considered and patient is agreeable to the plan of care and goals as stated below:     Anticipated Barriers for therapy: none      Goals:  Short Term Goals: 3-4 weeks   Increase hip flexion  strength by 1/2 grade, progressing, not met  Increase hip Abd strength by 1/2 grade, not met  Increase left ankle EV/IV by 1/2 grade, progressing, not met  Increase sit to  30 sec by 4 or more reps, progressing, not met  Decrease TUG by 2 secs or more, met    Long Term Goals: 8 weeks   Able to ambulate on tuff field w/o assisted device x 40 feet safely, progressing, not met  Increase sit to  30 sec to 13 or more, progressing, not met  Able to stand narrow stance w/o sway, progressing, not met  Able to ambulate with walking stick x 400 feet w/o break, progressing, not met  Increase LE strength to 4+/5  grossly, progressing, not met      Plan   Plan of care Certification: 12/27/2022 to 02/24/22    Outpatient Physical Therapy 2 times weekly for 8 weeks to include the following interventions: Gait Training, Manual Therapy, Neuromuscular Re-ed, Patient Education, Self Care, Therapeutic Activities and Therapeutic Exercise.     Sujit Urbina, PT

## 2022-12-28 NOTE — PROGRESS NOTES
[facility]   Physical Therapy Treatment Note     [facility]   Physical Therapy Treatment Note     Name: Mikey Ramirez  Clinic Number: [id number]    Therapy Diagnosis:   Encounter Diagnoses   Name Primary?    Leg weakness, bilateral Yes    Gait difficulty          Physician: Eloisa Frank,*    Visit Date: 12/29/2022    Physician Orders: PT Eval and Treat   Medical Diagnosis from Referral: E11.44 (ICD-10-CM) - Diabetic amyotrophy associated with type 2 diabetes mellitus      Evaluation Date: 11/9/2022  Authorization Period Expiration: 12/31/22  Plan of Care Expiration: 02/24/23  Visit # / Visits authorized: 35/49     Time In: 1645  Time Out: 1730  Total Billable Time: 45 minutes     Precautions: Standard, Diabetes and Fall        SUBJECTIVE     Pt reports: that he continues to have LE weakness and decreased coordination with walking any distance. He does feel like he has been improving with therapy but it is slow. He feels good about the exercises he is doing in the clinic and can tell progress;      .  He was compliant with home exercise program.  Response to previous treatment: did fine w/o increased pain/sorness  Functional change: none to date    Pain: 0/10  Location: bilateral LE      OBJECTIVE     Objective Measures updated at progress report unless specified.     Treatment     Mikey received the treatments listed below:      therapeutic exercises to develop strength, endurance, posture, and core stabilization for 55 minutes including:    Bike x 5 min to help strength/endurance of quads/hamstrings  Treadmill walking for coordination and endurance: 1.7 x 10 min  6 inch step ups x 10 each leg  Leg press , 30lbs : x10  , 20# x15  Standing hip Abd x 15 each  Standing knee flexion x 15  Sit to stand x 10: not performed  Steated elbow flexion: 5lbs x 20  LAQ x 30: 3lbs  Seated: hip Add: ball squeezes x 20   Standing: marching x 15  Standing hip Abd x  20  Seated rows: blue theraband 20x no performed  Seated shoulder press: 5bs : x 15   Seated: triceps presses : x 20, blue theraband   Treadmill walking from 1.6-2.0 x 5 min    neuromuscular re-education activities to improve: Balance, Coordination, Kinesthetic, Sense, and Proprioception for 0 minutes. The following activities were included:      Patient Education and Home Exercises     Home Exercises Provided and Patient Education Provided     Education provided:   - progression of exercises    Written Home Exercises Provided:  will be given as I get a better sense of what his recovery needs are .   Mikey demonstrated good  understanding of the education provided. See EMR under Patient Instructions for exercises provided during therapy sessions    ASSESSMENT     Mikey was started on some walking coordination/endurance using the treadmill which I am hopefull will help his ambulation which is what he notices the most difficulty with. He  has been doing well with his exercises in the clinic with slowly improving strength. He did well with his exercises and walking on treadmill today and seemed to enjoy the stability it added with being able to hold on.     Mikey Is progressing fair towards his goals.   Pt prognosis is Fair.     Pt will continue to benefit from skilled outpatient physical therapy to address the deficits listed in the problem list box on initial evaluation, provide pt/family education and to maximize pt's level of independence in the home and community environment.     Pt's spiritual, cultural and educational needs considered and pt agreeable to plan of care and goals.     Anticipated barriers to physical therapy: none      Goals:  Short Term Goals: 3-4 weeks   Increase hip flexion  strength by 1/2 grade, progressing, not met  Increase hip Abd strength by 1/2 grade, not met  Increase left ankle EV/IV by 1/2 grade, progressing, not met  Increase sit to  30 sec by 4 or more reps, progressing, not  met  Decrease TUG by 2 secs or more, met     Long Term Goals: 8 weeks   Able to ambulate on [employer] [place] w/o assisted device x 40 feet safely, progressing, not met  Increase sit to  30 sec to 13 or more, progressing, not met  Able to stand narrow stance w/o sway, progressing, not met  Able to ambulate with walking stick x 400 feet w/o break, progressing, not met  Increase LE strength to 4+/5 grossly, progressing, not met        Plan   Plan of care Certification: 12/27/2022 to 02/24/22     Outpatient Physical Therapy 2 times weekly for 8 weeks to include the following interventions: Gait Training, Manual Therapy, Neuromuscular Re-ed, Patient Education, Self Care, Therapeutic Activities and Therapeutic Exercise.         Sujit Urbina, PT

## 2022-12-29 ENCOUNTER — CLINICAL SUPPORT (OUTPATIENT)
Dept: REHABILITATION | Facility: HOSPITAL | Age: 67
End: 2022-12-29
Attending: PSYCHIATRY & NEUROLOGY
Payer: MEDICARE

## 2022-12-29 DIAGNOSIS — R26.9 GAIT DIFFICULTY: ICD-10-CM

## 2022-12-29 DIAGNOSIS — R29.898 LEG WEAKNESS, BILATERAL: Primary | ICD-10-CM

## 2022-12-29 PROCEDURE — 97110 THERAPEUTIC EXERCISES: CPT | Mod: PO

## 2023-01-04 ENCOUNTER — TELEPHONE (OUTPATIENT)
Dept: NEUROLOGY | Facility: CLINIC | Age: 68
End: 2023-01-04
Payer: MEDICARE

## 2023-01-04 DIAGNOSIS — E11.44 DIABETIC AMYOTROPHY ASSOCIATED WITH TYPE 2 DIABETES MELLITUS: Primary | ICD-10-CM

## 2023-01-04 DIAGNOSIS — R74.8 ELEVATED CK: ICD-10-CM

## 2023-01-04 NOTE — TELEPHONE ENCOUNTER
LVM informing patient of the time and date for the EMG study.  If this appt. Is a conflict with his schedule he can give us a call back.

## 2023-01-06 ENCOUNTER — CLINICAL SUPPORT (OUTPATIENT)
Dept: REHABILITATION | Facility: HOSPITAL | Age: 68
End: 2023-01-06
Attending: PSYCHIATRY & NEUROLOGY
Payer: MEDICARE

## 2023-01-06 DIAGNOSIS — R29.898 LEG WEAKNESS, BILATERAL: Primary | ICD-10-CM

## 2023-01-06 DIAGNOSIS — R26.9 GAIT DIFFICULTY: ICD-10-CM

## 2023-01-06 PROCEDURE — 97110 THERAPEUTIC EXERCISES: CPT | Mod: PO,CQ

## 2023-01-06 NOTE — PROGRESS NOTES
[facility]   Physical Therapy Treatment Note       Name: Mikey Ramirez  [facility] Number: [id number]    Therapy Diagnosis:   Encounter Diagnoses   Name Primary?    Leg weakness, bilateral Yes    Gait difficulty          Physician: Eloisa Frank,*    Visit Date: 1/6/2023    Physician Orders: PT Eval and Treat   Medical Diagnosis from Referral: E11.44 (ICD-10-CM) - Diabetic amyotrophy associated with type 2 diabetes mellitus      Evaluation Date: 11/9/2022  Authorization Period Expiration: 12/31/22  Plan of Care Expiration: 02/24/23  Visit # / Visits authorized: 36/49     Time In: 4:30 PM  Time Out: 5:20 PM  Total Billable Time: 50 minutes     Precautions: Standard, Diabetes and Fall        SUBJECTIVE     Pt reports: that he continues to have LE weakness and decreased coordination with walking any distance. He does feel like he has been improving with therapy but it is slow. He feels good about the exercises he is doing in the clinic and can tell progress;      .  He was compliant with home exercise program.  Response to previous treatment: did fine w/o increased pain/sorness  Functional change: none to date    Pain: 0/10  Location: bilateral LE      OBJECTIVE     Objective Measures updated at progress report unless specified.     Treatment     Mikey received the treatments listed below:      therapeutic exercises to develop strength, endurance, posture, and core stabilization for 50 minutes including:    Bike x 5 min to help strength/endurance of quads/hamstrings  Treadmill walking for coordination and endurance: 0.6 x 5 min 0.9 x 5 min  6 inch step ups x 10 each leg  Leg press , 30lbs : x10  , 20# x15  Standing hip Abd x 15 each  Standing knee flexion x 15  Sit to stand x 10: not performed  Steated elbow flexion: 5lbs x 20  LAQ x 30: 3lbs  Seated: hip Add: ball squeezes x 20   Standing: marching x 15  Seated rows: blue theraband 20x no performed  Seated shoulder press: 6bs : x 15   Seated:  triceps presses : x 20, blue theraband   Treadmill walking from 1.1 x 5 min    neuromuscular re-education activities to improve: Balance, Coordination, Kinesthetic, Sense, and Proprioception for 0 minutes. The following activities were included:      Patient Education and Home Exercises     Home Exercises Provided and Patient Education Provided     Education provided:   - progression of exercises    Written Home Exercises Provided:  will be given as I get a better sense of what his recovery needs are .   Mikey demonstrated good  understanding of the education provided. See EMR under Patient Instructions for exercises provided during therapy sessions    ASSESSMENT     Mikey adryan today's tx with progression of ther ex well. He was able to increase pace on treadmill today which is an improvement over last tx. He  has been doing well with his exercises in the clinic with slowly improving and progressing with UE strength. He did well with his exercises and walking on treadmill today and seemed to enjoy the stability it added with being able to hold on.     Mikey Is progressing fair towards his goals.   Pt prognosis is Fair.     Pt will continue to benefit from skilled outpatient physical therapy to address the deficits listed in the problem list box on initial evaluation, provide pt/family education and to maximize pt's level of independence in the home and community environment.     Pt's spiritual, cultural and educational needs considered and pt agreeable to plan of care and goals.     Anticipated barriers to physical therapy: none      Goals:  Short Term Goals: 3-4 weeks   Increase hip flexion  strength by 1/2 grade, progressing, not met  Increase hip Abd strength by 1/2 grade, not met  Increase left ankle EV/IV by 1/2 grade, progressing, not met  Increase sit to  30 sec by 4 or more reps, progressing, not met  Decrease TUG by 2 secs or more, met     Long Term Goals: 8 weeks   Able to ambulate on tuff field w/o  assisted device x 40 feet safely, progressing, not met  Increase sit to  30 sec to 13 or more, progressing, not met  Able to stand narrow stance w/o sway, progressing, not met  Able to ambulate with walking stick x 400 feet w/o break, progressing, not met  Increase LE strength to 4+/5 grossly, progressing, not met        Plan   Plan of care Certification: 12/27/2022 to 02/24/22     Outpatient Physical Therapy 2 times weekly for 8 weeks to include the following interventions: Gait Training, Manual Therapy, Neuromuscular Re-ed, Patient Education, Self Care, Therapeutic Activities and Therapeutic Exercise.         Steve Coy, PTA

## 2023-01-10 ENCOUNTER — CLINICAL SUPPORT (OUTPATIENT)
Dept: REHABILITATION | Facility: HOSPITAL | Age: 68
End: 2023-01-10
Attending: PSYCHIATRY & NEUROLOGY
Payer: MEDICARE

## 2023-01-10 DIAGNOSIS — R26.9 GAIT DIFFICULTY: ICD-10-CM

## 2023-01-10 DIAGNOSIS — R29.898 LEG WEAKNESS, BILATERAL: Primary | ICD-10-CM

## 2023-01-10 PROCEDURE — 97110 THERAPEUTIC EXERCISES: CPT | Mod: PO,CQ

## 2023-01-10 NOTE — PROGRESS NOTES
[facility]   Physical Therapy Treatment Note       Name: Mikey [place]ita  Clinic Number: [id number]    Therapy Diagnosis:   Encounter Diagnoses   Name Primary?    Leg weakness, bilateral Yes    Gait difficulty          Physician: Eloisa Frank,*    Visit Date: 1/10/2023    Physician Orders: PT Eval and Treat   Medical Diagnosis from Referral: E11.44 (ICD-10-CM) - Diabetic amyotrophy associated with type 2 diabetes mellitus      Evaluation Date: 11/9/2022  Authorization Period Expiration: 12/31/22  Plan of Care Expiration: 02/24/23  Visit # / Visits authorized: 36/49     Time In: 4:38 PM  Time Out: 5:20 PM  Total Billable Time: 42 minutes     Precautions: Standard, Diabetes and Fall        SUBJECTIVE     Pt reports: that it took him ~ 4 days to recover from last tx. However he states that his bal is improved and that he does not use his cane around the house as much.      .  He was compliant with home exercise program.  Response to previous treatment: did fine w/o increased pain/sorness  Functional change: none to date    Pain: 0/10  Location: bilateral LE      OBJECTIVE     Objective Measures updated at progress report unless specified.     Treatment     Mikey received the treatments listed below:      therapeutic exercises to develop strength, endurance, posture, and core stabilization for 42 minutes including:    Bike x 5 min to help strength/endurance of quads/hamstrings  Treadmill walking for coordination and endurance: 0.6   to 1.2 x 10 min  6 inch step ups x 10 each leg NP  Leg press , 30lbs : x10  , 20# x15  Standing hip Abd x 15 each NP  Standing knee flexion x 15 NP  Sit to stand x 10: not performed  Steated elbow flexion: 6lbs x 20  LAQ x 30: 3lbs  Seated: hip Add: ball squeezes x 20   Standing: marching x 15 NP  Seated rows: blue theraband 20x no performed  Seated shoulder press: 6bs : x 15   Seated: triceps presses : x 20, black  theraband   Treadmill walking from 1.1 x 5  min    neuromuscular re-education activities to improve: Balance, Coordination, Kinesthetic, Sense, and Proprioception for 0 minutes. The following activities were included:      Patient Education and Home Exercises     Home Exercises Provided and Patient Education Provided     Education provided:   - progression of exercises    Written Home Exercises Provided:  will be given as I get a better sense of what his recovery needs are .   Mikey demonstrated good  understanding of the education provided. See EMR under Patient Instructions for exercises provided during therapy sessions    ASSESSMENT     Mikey adryan today's tx with ther ex well. He was able to progress with resistance with tricep pushdown w/o difficulty or complaint.. He did not complete all exs today due to time and above report of length of time required to recover from last tx.. He did well with his exercises and walking on treadmill today with slight increase in pace..     Mikey Is progressing fair towards his goals.   Pt prognosis is Fair.     Pt will continue to benefit from skilled outpatient physical therapy to address the deficits listed in the problem list box on initial evaluation, provide pt/family education and to maximize pt's level of independence in the home and community environment.     Pt's spiritual, cultural and educational needs considered and pt agreeable to plan of care and goals.     Anticipated barriers to physical therapy: none      Goals:  Short Term Goals: 3-4 weeks   Increase hip flexion  strength by 1/2 grade, progressing, not met  Increase hip Abd strength by 1/2 grade, not met  Increase left ankle EV/IV by 1/2 grade, progressing, not met  Increase sit to  30 sec by 4 or more reps, progressing, not met  Decrease TUG by 2 secs or more, met     Long Term Goals: 8 weeks   Able to ambulate on [facility] [facility] w/o assisted device x 40 feet safely, progressing, not met  Increase sit to  30 sec to 13 or more, progressing, not  met  Able to stand narrow stance w/o sway, progressing, not met  Able to ambulate with walking stick x 400 feet w/o break, progressing, not met  Increase LE strength to 4+/5 grossly, progressing, not met        Plan   Plan of care Certification: 12/27/2022 to 02/24/22     Outpatient Physical Therapy 2 times weekly for 8 weeks to include the following interventions: Gait Training, Manual Therapy, Neuromuscular Re-ed, Patient Education, Self Care, Therapeutic Activities and Therapeutic Exercise.         Steve Coy, PTA

## 2023-01-17 ENCOUNTER — PATIENT MESSAGE (OUTPATIENT)
Dept: ADMINISTRATIVE | Facility: HOSPITAL | Age: 68
End: 2023-01-17
Payer: MEDICARE

## 2023-01-18 ENCOUNTER — CLINICAL SUPPORT (OUTPATIENT)
Dept: REHABILITATION | Facility: HOSPITAL | Age: 68
End: 2023-01-18
Attending: PSYCHIATRY & NEUROLOGY
Payer: MEDICARE

## 2023-01-18 DIAGNOSIS — R26.9 GAIT DIFFICULTY: ICD-10-CM

## 2023-01-18 DIAGNOSIS — R29.898 LEG WEAKNESS, BILATERAL: Primary | ICD-10-CM

## 2023-01-18 PROCEDURE — 97110 THERAPEUTIC EXERCISES: CPT | Mod: PO,CQ

## 2023-01-18 NOTE — PROGRESS NOTES
[facility]   Physical Therapy Treatment Note       Name: Mikey Carrier Clinic Number: [id number]    Therapy Diagnosis:   Encounter Diagnoses   Name Primary?    Leg weakness, bilateral Yes    Gait difficulty        Physician: Eloisa Frank,*    Visit Date: 1/18/2023    Physician Orders: PT Eval and Treat   Medical Diagnosis from Referral: E11.44 (ICD-10-CM) - Diabetic amyotrophy associated with type 2 diabetes mellitus      Evaluation Date: 11/9/2022  Authorization Period Expiration: 12/31/22  Plan of Care Expiration: 02/24/23  Visit # / Visits authorized: 3/20     Time In: 4:48 PM  Time Out: 5:30 PM  Total Billable Time: 42 minutes     Precautions: Standard, Diabetes and Fall        SUBJECTIVE     Pt reports: Pt goes back to Dr. Frank on March 7th.  The doctor wants to do an EMG of the lower limbs the day before but he is unsure why.  Pt had to cancel last visit because he didn't have enough of a gap between visits.  Pt does a lot of walking at work which causes him fatigue.  He was compliant with home exercise program.  Response to previous treatment: did fine w/o increased pain/sorness  Functional change: none to date    Pain: 0/10  Location: bilateral LE      OBJECTIVE     Objective Measures updated at progress report unless specified.     Treatment     Mikey received the treatments listed below:      therapeutic exercises to develop strength, endurance, posture, and core stabilization for 42 minutes including:    Bike x 5 min seat 14 to help strength/endurance of quads/hamstrings    Treadmill walking for coordination and endurance: 0.6   to 1.2 x 10 min- not performed  6 inch step ups x 10 each leg NP  Leg press , 30lbs x15, 20# x 12  Standing hip Abd x 15 each   Standing knee flexion x 20  Sit to stand x 10: not performed  Seated elbow flexion: 6lbs x 20  LAQ x 30: 3lbs  Seated: hip Add: ball squeezes x 20   Standing: marching x 12   Seated rows: blue theraband 20x no  performed  Seated shoulder press: 6bs : x 23   Seated: triceps presses : x 20, black  theraband   Treadmill walking from 0.6 mps to 1.1 x 12.5 min       neuromuscular re-education activities to improve: Balance, Coordination, Kinesthetic, Sense, and Proprioception for 0 minutes. The following activities were included:      Patient Education and Home Exercises     Home Exercises Provided and Patient Education Provided     Education provided:   - progression of exercises    Written Home Exercises Provided:    Mikey demonstrated good  understanding of the education provided. See EMR under Patient Instructions for exercises provided during therapy sessions    ASSESSMENT     Mikey adryan today's tx with ther ex well. Pt requires breaks inbetween exercises.  Pt requires longer breaks to recover from therapy and this continues.  Mikey Is progressing fair towards his goals.   Pt prognosis is Fair.     Pt will continue to benefit from skilled outpatient physical therapy to address the deficits listed in the problem list box on initial evaluation, provide pt/family education and to maximize pt's level of independence in the home and community environment.     Pt's spiritual, cultural and educational needs considered and pt agreeable to plan of care and goals.     Anticipated barriers to physical therapy: none      Goals:  Short Term Goals: 3-4 weeks   Increase hip flexion  strength by 1/2 grade, progressing, not met  Increase hip Abd strength by 1/2 grade, not met  Increase left ankle EV/IV by 1/2 grade, progressing, not met  Increase sit to  30 sec by 4 or more reps, progressing, not met  Decrease TUG by 2 secs or more, met     Long Term Goals: 8 weeks   Able to ambulate on [employer] field w/o assisted device x 40 feet safely, progressing, not met  Increase sit to  30 sec to 13 or more, progressing, not met  Able to stand narrow stance w/o sway, progressing, not met  Able to ambulate with walking stick x 400 feet w/o  break, progressing, not met  Increase LE strength to 4+/5 grossly, progressing, not met        Plan   Plan of care Certification: 12/27/2022 to 02/24/22     Outpatient Physical Therapy 2 times weekly for 8 weeks to include the following interventions: Gait Training, Manual Therapy, Neuromuscular Re-ed, Patient Education, Self Care, Therapeutic Activities and Therapeutic Exercise.       Latoya Miller, PTA

## 2023-01-23 ENCOUNTER — CLINICAL SUPPORT (OUTPATIENT)
Dept: REHABILITATION | Facility: HOSPITAL | Age: 68
End: 2023-01-23
Attending: PSYCHIATRY & NEUROLOGY
Payer: MEDICARE

## 2023-01-23 DIAGNOSIS — R26.9 GAIT DIFFICULTY: ICD-10-CM

## 2023-01-23 DIAGNOSIS — R29.898 LEG WEAKNESS, BILATERAL: Primary | ICD-10-CM

## 2023-01-23 PROCEDURE — 97110 THERAPEUTIC EXERCISES: CPT | Mod: PO

## 2023-01-23 NOTE — PROGRESS NOTES
[facility]   Physical Therapy Treatment Note       Name: Mikey Madison  Clinic Number: [id number]    Therapy Diagnosis:   No diagnosis found.      Physician: Eloisa Frank,*    Visit Date: 1/23/2023    Physician Orders: PT Eval and Treat   Medical Diagnosis from Referral: E11.44 (ICD-10-CM) - Diabetic amyotrophy associated with type 2 diabetes mellitus      Evaluation Date: 11/9/2022  Authorization Period Expiration: 12/31/22  Plan of Care Expiration: 02/24/23  Visit # / Visits authorized: 3/20     Time In: 4:48 PM  Time Out: 5:30 PM  Total Billable Time: 42 minutes     Precautions: Standard, Diabetes and Fall        SUBJECTIVE     Pt reports: Pt states nothing new since last visit.   He was occasional compliance of HEP. But tries to stat active.   Response to previous treatment: did fine w/o increased pain/sorness  Functional change: none to date    Pain: 0/10  Location: bilateral LE      OBJECTIVE     Objective Measures updated at progress report unless specified.     Treatment     Mikey received the treatments listed below:      therapeutic exercises to develop strength, endurance, posture, and core stabilization for 42 minutes including:    Bike x 6 min seat 14 to help strength/endurance of quads/hamstrings  Leg press , 30lbs x15, 20# x 12  Standing hip Abd with 3# x 15 each   Standing hip extension with 3# x 15 each       Sit to stand  with and without airex x 10:       LAQ x 15 B: 3lbs  Seated: hip Add: ball squeezes x 20   Standing: marching x 15   Seated rows: blue theraband 20x no performed  Seated shoulder press: with Blue theraband: x 15    Seated: triceps presses B: x 20, black  theraband     Treadmill walking from 0.6 mps to 1.1 x 12.5 min    Not Today:    Treadmill walking for coordination and endurance: 0.6   to 1.2 x 10 min- not performed  6 inch step ups x 10 each leg NP  Seated elbow flexion: 6lbs x 20  Standing knee flexion x 20    neuromuscular re-education  activities to improve: Balance, Coordination, Kinesthetic, Sense, and Proprioception for 0 minutes. The following activities were included:      Patient Education and Home Exercises     Home Exercises Provided and Patient Education Provided     Education provided:   - progression of exercises    Written Home Exercises Provided:    Mikey demonstrated good  understanding of the education provided. See EMR under Patient Instructions for exercises provided during therapy sessions    ASSESSMENT     Mikey adryan today's tx with ther ex well. Pt requires breaks inbetween exercises.  Pt requires longer breaks to recover from therapy and this continues.  Mikey Is progressing fair towards his goals.   Pt prognosis is Fair.     Pt will continue to benefit from skilled outpatient physical therapy to address the deficits listed in the problem list box on initial evaluation, provide pt/family education and to maximize pt's level of independence in the home and community environment.     Pt's spiritual, cultural and educational needs considered and pt agreeable to plan of care and goals.     Anticipated barriers to physical therapy: none      Goals:  Short Term Goals: 3-4 weeks   Increase hip flexion  strength by 1/2 grade, progressing, not met  Increase hip Abd strength by 1/2 grade, not met  Increase left ankle EV/IV by 1/2 grade, progressing, not met  Increase sit to  30 sec by 4 or more reps, progressing, not met  Decrease TUG by 2 secs or more, met     Long Term Goals: 8 weeks   Able to ambulate on [employer] field w/o assisted device x 40 feet safely, progressing, not met  Increase sit to  30 sec to 13 or more, progressing, not met  Able to stand narrow stance w/o sway, progressing, not met  Able to ambulate with walking stick x 400 feet w/o break, progressing, not met  Increase LE strength to 4+/5 grossly, progressing, not met        Plan   Plan of care Certification: 12/27/2022 to 02/24/22     Outpatient Physical Therapy  2 times weekly for 8 weeks to include the following interventions: Gait Training, Manual Therapy, Neuromuscular Re-ed, Patient Education, Self Care, Therapeutic Activities and Therapeutic Exercise.       Alessandro Ramos, PT

## 2023-01-23 NOTE — PROGRESS NOTES
[facility]   Physical Therapy Treatment Note       Name: Mikey [facility] Number: [id number]    Therapy Diagnosis:   Encounter Diagnoses   Name Primary?    Leg weakness, bilateral Yes    Gait difficulty          Physician: Eloisa Frank,*    Visit Date: 1/23/2023    Physician Orders: PT Eval and Treat   Medical Diagnosis from Referral: E11.44 (ICD-10-CM) - Diabetic amyotrophy associated with type 2 diabetes mellitus      Evaluation Date: 11/9/2022  Authorization Period Expiration: 12/31/22  Plan of Care Expiration: 02/24/23  Visit # / Visits authorized: 3/20     Time In: 4:48 PM  Time Out: 5:35 PM  Total Billable Time: 45 minutes     Precautions: Standard, Diabetes and Fall        SUBJECTIVE     Pt reports:Pt states nothing new since last visit.   He was occasional compliance of HEP. But tries to stay active.   Response to previous treatment: did fine w/o increased pain/sorness  Functional change: none to date    Pain: 0/10  Location: bilateral LE      OBJECTIVE     Objective Measures updated at progress report unless specified.     Treatment     Mikey received the treatments listed below:      therapeutic exercises to develop strength, endurance, posture, and core stabilization for 45 minutes including:    Bike x 6 min seat 14 to help strength/endurance of quads/hamstrings  Leg press 30# x 15, RLE 20# x 5, LLE 10# x 5   Standing hip Abd with 3# x 15 each   Standing hip extension with 3# x 15 each   Sit to stand  with and without airex x 10:   LAQ x 15 B: 3lbs  Seated: hip Add: ball squeezes x 20   Standing: marching x 15   Seated rows: blue theraband 20x no performed  Seated shoulder press: with Blue theraband: x 15    Seated: triceps presses B: x 20, black  theraband   Resisted ambulation with Blue theraband x 5      Not Today:    Treadmill walking for coordination and endurance: 0.6   to 1.2 x 10 min- not performed  6 inch step ups x 10 each leg NP  Seated elbow flexion: 6lbs  x 20  Standing knee flexion x 20  Treadmill walking from 0.6 mps to 1.1 x 12.5 min  neuromuscular re-education activities to improve: Balance, Coordination, Kinesthetic, Sense, and Proprioception for 0 minutes. The following activities were included:      Patient Education and Home Exercises     Home Exercises Provided and Patient Education Provided     Education provided:   - progression of exercises    Written Home Exercises Provided:    Mikey demonstrated good  understanding of the education provided. See EMR under Patient Instructions for exercises provided during therapy sessions    ASSESSMENT   Able to complete majority of ex's with moderate difficulty. Single leg strength and endurance is decreased but reports having good and bad days. Overall leg endurance was good today, able to complete x 15 repetitions with majority of exercise.       Pt prognosis is Fair.     Pt will continue to benefit from skilled outpatient physical therapy to address the deficits listed in the problem list box on initial evaluation, provide pt/family education and to maximize pt's level of independence in the home and community environment.     Pt's spiritual, cultural and educational needs considered and pt agreeable to plan of care and goals.     Anticipated barriers to physical therapy: none      Goals:  Short Term Goals: 3-4 weeks   Increase hip flexion  strength by 1/2 grade, progressing, not met  Increase hip Abd strength by 1/2 grade, not met  Increase left ankle EV/IV by 1/2 grade, progressing, not met  Increase sit to  30 sec by 4 or more reps, progressing, not met  Decrease TUG by 2 secs or more, met     Long Term Goals: 8 weeks   Able to ambulate on tuff field w/o assisted device x 40 feet safely, progressing, not met  Increase sit to  30 sec to 13 or more, progressing, not met  Able to stand narrow stance w/o sway, progressing, not met  Able to ambulate with walking stick x 400 feet w/o break, progressing,  not met  Increase LE strength to 4+/5 grossly, progressing, not met        Plan   Plan of care Certification: 12/27/2022 to 02/24/22     Outpatient Physical Therapy 2 times weekly for 8 weeks to include the following interventions: Gait Training, Manual Therapy, Neuromuscular Re-ed, Patient Education, Self Care, Therapeutic Activities and Therapeutic Exercise.       Alessandro Ramos, PT

## 2023-01-25 ENCOUNTER — OFFICE VISIT (OUTPATIENT)
Dept: FAMILY MEDICINE | Facility: CLINIC | Age: 68
End: 2023-01-25
Payer: MEDICARE

## 2023-01-25 ENCOUNTER — LAB VISIT (OUTPATIENT)
Dept: LAB | Facility: HOSPITAL | Age: 68
End: 2023-01-25
Attending: INTERNAL MEDICINE
Payer: MEDICARE

## 2023-01-25 VITALS
HEIGHT: 70 IN | SYSTOLIC BLOOD PRESSURE: 132 MMHG | DIASTOLIC BLOOD PRESSURE: 70 MMHG | HEART RATE: 57 BPM | BODY MASS INDEX: 29.54 KG/M2 | OXYGEN SATURATION: 99 % | WEIGHT: 206.38 LBS

## 2023-01-25 DIAGNOSIS — R74.8 ELEVATED CK: ICD-10-CM

## 2023-01-25 DIAGNOSIS — E11.42 TYPE 2 DIABETES MELLITUS WITH DIABETIC POLYNEUROPATHY, WITH LONG-TERM CURRENT USE OF INSULIN: ICD-10-CM

## 2023-01-25 DIAGNOSIS — E11.319 TYPE 2 DIABETES MELLITUS WITH RETINOPATHY OF BOTH EYES, WITH LONG-TERM CURRENT USE OF INSULIN, MACULAR EDEMA PRESENCE UNSPECIFIED, UNSPECIFIED RETINOPATHY SEVERITY: ICD-10-CM

## 2023-01-25 DIAGNOSIS — E11.29 TYPE 2 DIABETES MELLITUS WITH MICROALBUMINURIA, WITH LONG-TERM CURRENT USE OF INSULIN: ICD-10-CM

## 2023-01-25 DIAGNOSIS — R80.9 TYPE 2 DIABETES MELLITUS WITH MICROALBUMINURIA, WITH LONG-TERM CURRENT USE OF INSULIN: ICD-10-CM

## 2023-01-25 DIAGNOSIS — Z79.4 TYPE 2 DIABETES MELLITUS WITH MICROALBUMINURIA, WITH LONG-TERM CURRENT USE OF INSULIN: ICD-10-CM

## 2023-01-25 DIAGNOSIS — R60.0 BILATERAL LOWER EXTREMITY EDEMA: ICD-10-CM

## 2023-01-25 DIAGNOSIS — Z79.4 TYPE 2 DIABETES MELLITUS WITH RETINOPATHY OF BOTH EYES, WITH LONG-TERM CURRENT USE OF INSULIN, MACULAR EDEMA PRESENCE UNSPECIFIED, UNSPECIFIED RETINOPATHY SEVERITY: ICD-10-CM

## 2023-01-25 DIAGNOSIS — Z79.4 TYPE 2 DIABETES MELLITUS WITH DIABETIC POLYNEUROPATHY, WITH LONG-TERM CURRENT USE OF INSULIN: ICD-10-CM

## 2023-01-25 DIAGNOSIS — E08.44 DIABETIC AMYOTROPHY ASSOCIATED WITH DIABETES MELLITUS DUE TO UNDERLYING CONDITION: Primary | ICD-10-CM

## 2023-01-25 LAB
ALBUMIN SERPL BCP-MCNC: 3.3 G/DL (ref 3.5–5.2)
ALP SERPL-CCNC: 104 U/L (ref 55–135)
ALT SERPL W/O P-5'-P-CCNC: 57 U/L (ref 10–44)
ANION GAP SERPL CALC-SCNC: 8 MMOL/L (ref 8–16)
AST SERPL-CCNC: 45 U/L (ref 10–40)
BILIRUB SERPL-MCNC: 0.4 MG/DL (ref 0.1–1)
BNP SERPL-MCNC: 15 PG/ML (ref 0–99)
BUN SERPL-MCNC: 13 MG/DL (ref 8–23)
CALCIUM SERPL-MCNC: 9.1 MG/DL (ref 8.7–10.5)
CHLORIDE SERPL-SCNC: 107 MMOL/L (ref 95–110)
CHOLEST SERPL-MCNC: 181 MG/DL (ref 120–199)
CHOLEST/HDLC SERPL: 3 {RATIO} (ref 2–5)
CK SERPL-CCNC: 668 U/L (ref 20–200)
CO2 SERPL-SCNC: 24 MMOL/L (ref 23–29)
CREAT SERPL-MCNC: 0.7 MG/DL (ref 0.5–1.4)
ERYTHROCYTE [DISTWIDTH] IN BLOOD BY AUTOMATED COUNT: 13.5 % (ref 11.5–14.5)
EST. GFR  (NO RACE VARIABLE): >60 ML/MIN/1.73 M^2
ESTIMATED AVG GLUCOSE: 186 MG/DL (ref 68–131)
GLUCOSE SERPL-MCNC: 158 MG/DL (ref 70–110)
HBA1C MFR BLD: 8.1 % (ref 4–5.6)
HCT VFR BLD AUTO: 35.2 % (ref 40–54)
HDLC SERPL-MCNC: 61 MG/DL (ref 40–75)
HDLC SERPL: 33.7 % (ref 20–50)
HGB BLD-MCNC: 11.1 G/DL (ref 14–18)
LDLC SERPL CALC-MCNC: 107.8 MG/DL (ref 63–159)
MCH RBC QN AUTO: 25.6 PG (ref 27–31)
MCHC RBC AUTO-ENTMCNC: 31.5 G/DL (ref 32–36)
MCV RBC AUTO: 81 FL (ref 82–98)
NONHDLC SERPL-MCNC: 120 MG/DL
PLATELET # BLD AUTO: 469 K/UL (ref 150–450)
PMV BLD AUTO: 10.9 FL (ref 9.2–12.9)
POTASSIUM SERPL-SCNC: 4.4 MMOL/L (ref 3.5–5.1)
PROT SERPL-MCNC: 6.2 G/DL (ref 6–8.4)
RBC # BLD AUTO: 4.33 M/UL (ref 4.6–6.2)
SODIUM SERPL-SCNC: 139 MMOL/L (ref 136–145)
TRIGL SERPL-MCNC: 61 MG/DL (ref 30–150)
TSH SERPL DL<=0.005 MIU/L-ACNC: 1.47 UIU/ML (ref 0.4–4)
WBC # BLD AUTO: 6.47 K/UL (ref 3.9–12.7)

## 2023-01-25 PROCEDURE — 82570 ASSAY OF URINE CREATININE: CPT | Performed by: INTERNAL MEDICINE

## 2023-01-25 PROCEDURE — 80053 COMPREHEN METABOLIC PANEL: CPT | Performed by: INTERNAL MEDICINE

## 2023-01-25 PROCEDURE — 99999 PR PBB SHADOW E&M-EST. PATIENT-LVL IV: ICD-10-PCS | Mod: PBBFAC,,, | Performed by: INTERNAL MEDICINE

## 2023-01-25 PROCEDURE — 84443 ASSAY THYROID STIM HORMONE: CPT | Performed by: INTERNAL MEDICINE

## 2023-01-25 PROCEDURE — 99214 OFFICE O/P EST MOD 30 MIN: CPT | Mod: S$PBB,,, | Performed by: INTERNAL MEDICINE

## 2023-01-25 PROCEDURE — 99214 PR OFFICE/OUTPT VISIT, EST, LEVL IV, 30-39 MIN: ICD-10-PCS | Mod: S$PBB,,, | Performed by: INTERNAL MEDICINE

## 2023-01-25 PROCEDURE — 36415 COLL VENOUS BLD VENIPUNCTURE: CPT | Mod: PO | Performed by: INTERNAL MEDICINE

## 2023-01-25 PROCEDURE — 80061 LIPID PANEL: CPT | Performed by: INTERNAL MEDICINE

## 2023-01-25 PROCEDURE — 83880 ASSAY OF NATRIURETIC PEPTIDE: CPT | Performed by: INTERNAL MEDICINE

## 2023-01-25 PROCEDURE — 83036 HEMOGLOBIN GLYCOSYLATED A1C: CPT | Performed by: INTERNAL MEDICINE

## 2023-01-25 PROCEDURE — 99214 OFFICE O/P EST MOD 30 MIN: CPT | Mod: PBBFAC,PO | Performed by: INTERNAL MEDICINE

## 2023-01-25 PROCEDURE — 85027 COMPLETE CBC AUTOMATED: CPT | Performed by: INTERNAL MEDICINE

## 2023-01-25 PROCEDURE — 99999 PR PBB SHADOW E&M-EST. PATIENT-LVL IV: CPT | Mod: PBBFAC,,, | Performed by: INTERNAL MEDICINE

## 2023-01-25 PROCEDURE — 82550 ASSAY OF CK (CPK): CPT | Performed by: INTERNAL MEDICINE

## 2023-01-25 RX ORDER — BUMETANIDE 0.5 MG/1
0.5 TABLET ORAL DAILY
Qty: 90 TABLET | Refills: 3 | Status: SHIPPED | OUTPATIENT
Start: 2023-01-25 | End: 2023-03-03 | Stop reason: SDUPTHER

## 2023-01-25 RX ORDER — FLASH GLUCOSE SCANNING READER
EACH MISCELLANEOUS
Qty: 4 EACH | Refills: 2 | Status: SHIPPED | OUTPATIENT
Start: 2023-01-25 | End: 2023-01-27 | Stop reason: SDUPTHER

## 2023-01-25 RX ORDER — FLASH GLUCOSE SENSOR
KIT MISCELLANEOUS
Qty: 1 KIT | Refills: 1 | Status: SHIPPED | OUTPATIENT
Start: 2023-01-25 | End: 2023-01-27 | Stop reason: SDUPTHER

## 2023-01-25 NOTE — PROGRESS NOTES
Subjective     Mikey Ramirez is a 67 y.o. old, male here for Edema and Follow-up (Discuss EMG procedure)    67-year-old with PMH of Type 2 IDDM with neuropathy/retinopathy, HTN, HLD, diabetic amyotrophy    IDDM: neuropathy stable, A1C not controlled likely due to meal time BG spikes. He did not start mealtime insulin as requested and has just tried to eat very little. He has had a few hypoglycemic episodes that were mild that he treated appropriately. I have asked him to back off on his Lantus dosing a bit.  Because of the necessity to start this and check BG's 4 or more times a day I recommended a CGM.  Unfortunately due to a lapse in insurance and lack of PT he has lost some ground with Lower extremity strength related to his amyotrophy.  Aldolase significantly elevated. Repeat EMG/NCS recommended, not sure why this would be and if neuro plans to do a muscle biopsy?  Recent dependant edema bilaterally that is otherwise asymptomatic.    Review of Systems   Constitutional:  Negative for chills and fever.   Respiratory: Negative.     Cardiovascular:  Positive for leg swelling. Negative for chest pain and palpitations.   Medications     Outpatient Medications Marked as Taking for the 1/25/23 encounter (Office Visit) with Branden Gallardo MD   Medication Sig Dispense Refill    aspirin (ECOTRIN) 81 MG EC tablet Take 81 mg by mouth once daily.      blood sugar diagnostic (FREESTYLE LITE STRIPS) Strp USE TO CHECK BLOOD GLUCOSE ONCE DAILY 200 strip 3    blood-glucose meter kit To check BG 1 times daily, to use with insurance preferred meter. ICD10: E11. 1 each 1    gabapentin (NEURONTIN) 300 MG capsule Take 1 capsule (300 mg total) by mouth 3 (three) times daily. 270 capsule 3    insulin (LANTUS SOLOSTAR U-100 INSULIN) glargine 100 units/mL SubQ pen Inject 52 Units into the skin once daily. 48 mL 1    lisinopriL (PRINIVIL,ZESTRIL) 20 MG tablet Take 1 tablet (20 mg total) by mouth once daily. 90 tablet 3    LUTEIN ORAL  "Take by mouth.      SURE COMFORT PEN NEEDLE 32 gauge x 1/4" Ndle USE 1 PEN NEEDLE AS NEEDED 100 each 3     Objective     /70   Pulse (!) 57   Ht 5' 10" (1.778 m)   Wt 93.6 kg (206 lb 5.6 oz)   SpO2 99%   BMI 29.61 kg/m²   Physical Exam  Constitutional:       General: He is not in acute distress.     Appearance: Normal appearance. He is well-developed.   Neurological:      Mental Status: He is alert.     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    Type 2 diabetes mellitus with microalbuminuria, with long-term current use of insulin  -     Lipid Panel; Future; Expected date: 01/25/2023  -     Comprehensive Metabolic Panel; Future; Expected date: 01/25/2023  -     CBC Without Differential; Future; Expected date: 01/25/2023  -     Hemoglobin A1C; Future; Expected date: 01/25/2023  -     Microalbumin/Creatinine Ratio, Urine    Type 2 diabetes mellitus with diabetic polyneuropathy, with long-term current use of insulin  -     flash glucose scanning reader (FREESTYLE COURTNEY 14 DAY READER) Misc; use As directed  Dispense: 4 each; Refill: 2  -     flash glucose sensor (FREESTYLE COURTNEY 14 DAY SENSOR) Kit; As directed  Dispense: 1 kit; Refill: 1    Type 2 diabetes mellitus with retinopathy of both eyes, with long-term current use of insulin, macular edema presence unspecified, unspecified retinopathy severity    Bilateral lower extremity edema  -     TSH; Future; Expected date: 01/25/2023  -     B-TYPE NATRIURETIC PEPTIDE; Future; Expected date: 01/25/2023  -     bumetanide (BUMEX) 0.5 MG Tab; Take 1 tablet (0.5 mg total) by mouth once daily.  Dispense: 90 tablet; Refill: 3    Elevated CK  -     CK; Future; Expected date: 01/25/2023        Follow up in about 3 months (around 4/25/2023).  ___________________  Branden Gallardo MD  Internal Medicine and Pediatrics  "

## 2023-01-26 ENCOUNTER — CLINICAL SUPPORT (OUTPATIENT)
Dept: REHABILITATION | Facility: HOSPITAL | Age: 68
End: 2023-01-26
Attending: PSYCHIATRY & NEUROLOGY
Payer: MEDICARE

## 2023-01-26 DIAGNOSIS — R26.9 GAIT DIFFICULTY: ICD-10-CM

## 2023-01-26 DIAGNOSIS — R29.898 LEG WEAKNESS, BILATERAL: Primary | ICD-10-CM

## 2023-01-26 PROCEDURE — 97110 THERAPEUTIC EXERCISES: CPT | Mod: PO,CQ

## 2023-01-26 NOTE — PROGRESS NOTES
[facility]   Physical Therapy Treatment Note       Name: Mikey [facility] Number: [id number]    Therapy Diagnosis:   Encounter Diagnoses   Name Primary?    Leg weakness, bilateral Yes    Gait difficulty          Physician: Eloisa Frank,*    Visit Date: 1/26/2023    Physician Orders: PT Eval and Treat   Medical Diagnosis from Referral: E11.44 (ICD-10-CM) - Diabetic amyotrophy associated with type 2 diabetes mellitus      Evaluation Date: 11/9/2022  Authorization Period Expiration: 12/31/22  Plan of Care Expiration: 02/24/23  Visit # / Visits authorized: 3/20     Time In: 5:00 PM  Time Out: 6:00 PM  Total Billable Time: 60 minutes     Precautions: Standard, Diabetes and Fall        SUBJECTIVE     Pt reports:Pt states nothing new since last visit.   He was occasional compliance of HEP. But tries to stay active.   Response to previous treatment: did fine w/o increased pain/sorness  Functional change: none to date    Pain: 0/10  Location: bilateral LE      OBJECTIVE     Objective Measures updated at progress report unless specified.     Treatment     Mikey received the treatments listed below:      therapeutic exercises to develop strength, endurance, posture, and core stabilization for 60 minutes including:    Bike x 6 min seat 14 to help strength/endurance of quads/hamstrings  Treadmill walking for coordination and endurance: 0.6   to 1.2 x 10 min-   Leg press 30# x 15, RLE 20# x 5, LLE 10# x 5   Standing hip Abd with 3# x 15 each   Standing hip extension with 3# x 15 each   Sit to stand  with  airex x 10:   Seated elbow flexion: 6lbs x 20  Standing knee flexion x 20 3#  LAQ x 15 B: 3lbs  Seated: hip Add: ball squeezes x 20   Seated rows: blue theraband 20x no performed  Seated shoulder press: 6#       Not Today:    Standing: marching x 15   6 inch step ups x 10 each leg NP  Seated: triceps presses B: x 20, black  theraband   Resisted ambulation with Blue theraband x 5      neuromuscular re-education activities to improve: Balance, Coordination, Kinesthetic, Sense, and Proprioception for 0 minutes. The following activities were included:      Patient Education and Home Exercises     Home Exercises Provided and Patient Education Provided     Education provided:   - progression of exercises    Written Home Exercises Provided:    Mikey demonstrated good  understanding of the education provided. See EMR under Patient Instructions for exercises provided during therapy sessions    ASSESSMENT   Mikey adryan today's tx with ther ex well. He was able to amb x 10 min with good endurance today.  Overall leg endurance was good today, able to complete x 15 repetitions with majority of exercise. the exception being LAQ which required rest break on L.      Pt prognosis is Fair.     Pt will continue to benefit from skilled outpatient physical therapy to address the deficits listed in the problem list box on initial evaluation, provide pt/family education and to maximize pt's level of independence in the home and community environment.     Pt's spiritual, cultural and educational needs considered and pt agreeable to plan of care and goals.     Anticipated barriers to physical therapy: none      Goals:  Short Term Goals: 3-4 weeks   Increase hip flexion  strength by 1/2 grade, progressing, not met  Increase hip Abd strength by 1/2 grade, not met  Increase left ankle EV/IV by 1/2 grade, progressing, not met  Increase sit to  30 sec by 4 or more reps, progressing, not met  Decrease TUG by 2 secs or more, met     Long Term Goals: 8 weeks   Able to ambulate on [employer] field w/o assisted device x 40 feet safely, progressing, not met  Increase sit to  30 sec to 13 or more, progressing, not met  Able to stand narrow stance w/o sway, progressing, not met  Able to ambulate with walking stick x 400 feet w/o break, progressing, not met  Increase LE strength to 4+/5 grossly, progressing, not met         Plan   Plan of care Certification: 12/27/2022 to 02/24/22     Outpatient Physical Therapy 2 times weekly for 8 weeks to include the following interventions: Gait Training, Manual Therapy, Neuromuscular Re-ed, Patient Education, Self Care, Therapeutic Activities and Therapeutic Exercise.       Steve Coy, PTA

## 2023-01-27 ENCOUNTER — PATIENT MESSAGE (OUTPATIENT)
Dept: FAMILY MEDICINE | Facility: CLINIC | Age: 68
End: 2023-01-27
Payer: MEDICARE

## 2023-01-27 DIAGNOSIS — E11.42 TYPE 2 DIABETES MELLITUS WITH DIABETIC POLYNEUROPATHY, WITH LONG-TERM CURRENT USE OF INSULIN: ICD-10-CM

## 2023-01-27 DIAGNOSIS — Z79.4 TYPE 2 DIABETES MELLITUS WITH DIABETIC POLYNEUROPATHY, WITH LONG-TERM CURRENT USE OF INSULIN: ICD-10-CM

## 2023-01-27 RX ORDER — FLASH GLUCOSE SCANNING READER
EACH MISCELLANEOUS
Qty: 2 EACH | Refills: 3 | Status: SHIPPED | OUTPATIENT
Start: 2023-01-27

## 2023-01-27 RX ORDER — FLASH GLUCOSE SENSOR
KIT MISCELLANEOUS
Qty: 6 KIT | Refills: 3 | Status: SHIPPED | OUTPATIENT
Start: 2023-01-27 | End: 2023-03-03 | Stop reason: SDUPTHER

## 2023-01-30 ENCOUNTER — TELEPHONE (OUTPATIENT)
Dept: FAMILY MEDICINE | Facility: CLINIC | Age: 68
End: 2023-01-30
Payer: MEDICARE

## 2023-01-30 NOTE — TELEPHONE ENCOUNTER
----- Message from Branden Gallardo MD sent at 1/27/2023  7:59 AM CST -----  Regarding: RE: Pt contact request  Please forward to my staff or the pharmacy staff where I sent the CGM order  ----- Message -----  From: Steve Coy PTA  Sent: 1/26/2023   5:17 PM CST  To: Branden Gallardo MD  Subject: Pt contact request                               Dr. Gallardo, Mr. Ramirez is trying to contact your office regarding a CGM. He is requesting a call back.

## 2023-01-31 ENCOUNTER — CLINICAL SUPPORT (OUTPATIENT)
Dept: REHABILITATION | Facility: HOSPITAL | Age: 68
End: 2023-01-31
Attending: PSYCHIATRY & NEUROLOGY
Payer: MEDICARE

## 2023-01-31 DIAGNOSIS — R26.9 GAIT DIFFICULTY: ICD-10-CM

## 2023-01-31 DIAGNOSIS — R29.898 LEG WEAKNESS, BILATERAL: Primary | ICD-10-CM

## 2023-01-31 PROCEDURE — 97112 NEUROMUSCULAR REEDUCATION: CPT | Mod: PO

## 2023-02-01 NOTE — PLAN OF CARE
[facility]   Physical Therapy Progress and Plan of Care UPDATE       Name: Mikey Ramirez  Clinic Number: [id number]    Therapy Diagnosis:   Encounter Diagnoses   Name Primary?    Leg weakness, bilateral Yes    Gait difficulty      Physician: Eloisa Frank,*    Visit Date: 1/31/2023    Physician Orders: PT Eval and Treat   Medical Diagnosis from Referral: E11.44 (ICD-10-CM) - Diabetic amyotrophy associated with type 2 diabetes mellitus      Evaluation Date: 11/9/2022  Authorization Period Expiration: 12/31/22  Plan of Care Expiration: 02/24/23 ---> 03/31/2023  Visit # / Visits authorized: 6/20     Time In: 1600  Time Out: 1700  Total Billable Time: 30 minutes one-on-one with PT or PTA     Precautions: Standard, Diabetes and Fall        SUBJECTIVE     Pt reports: Feels he has not completely regained the progress her lost prior to his interrupted PT treatments. He is relying on his cane whenever he is out of the house which is not where he used to be. He avoids walking to the break room at work (~75 yards) because it takes too long and it can be too taxing. He is not really doing much during the week other than going to work (he has not made time during the day to do any exercise outside of PT). On the weekends he spends time with his two dogs, but is sitting most of the days.    Response to previous treatment: Typically sore the next day, especially with treadmill walking  Functional change: Slow progression, but limited overall walking/activity improvements    Pain: 0/10  Location: bilateral LE      OBJECTIVE        Able to perform articulating bridge and isometric unilateral bridge.    Significant strength deficits with hip flexion and ankle DF on left.     GAIT DEVIATIONS: Mikey amb with decreased velocity of limb motion, decreased step length, decreased stride length, increased stride width and decreased weight-shifting ability.with walking stick and decreased balance, steppage type  gait. Limited hip drive - relying on trunk lean and excessive weight shift to progress limb.     Decreased standing balance with narrow stance with increased sway with Eyes open and closed, able to  staggered stance with Supervision  - visible trembling in bilateral lower extremity  - lack of stepping strategy     30 sec sit to stand test: 9  TU sec with walking stick use in RUE     Strength    Right LE Left LE   Hip flexion 3/5 3-/5   Hip extension        Hip abduction 3+/5 3+/5   Hip adduction 4+/5 4/+5   Knee flexion 4+/5 4+/5   Knee extension 4+/5 4+/5           Peroneals 4/5 4-/5   Tibialis anterior 4/5 4-/5   Tibialis posterior 4+/5 4/5   Gatroc/soleus 4+/5 4+/5         Pt/family was provided educational information, including: role of PT, goals for PT, scheduling - pt verbalized understanding. Discussed insurance plan with pt.      CMS Impairment/Limitation/Restriction for FOTO LE Survey     Therapist reviewed FOTO scores for Mikey Ramirez on 2022.   FOTO documents entered into [employer] - see Media section.     Limitation Score: not collected this visit                 Treatment     Mikey received the treatments listed below:      therapeutic exercises to develop strength, endurance, posture, and core stabilization for 20 minutes including:    Bike x 6 min seat 14 to help strength/endurance of quads/hamstrings  Treadmill walking for coordination and endurance: 0.6 to 1.2 mph x 10 min  - able to perform with intermittent unilateral upper extremity support  Leg press 30# x 15, RLE 20# x 5, LLE 10# x 5     Not performed:  Standing hip Abd with 3# x 15 each   Standing hip extension with 3# x 15 each   Sit to stand  with  airex x 10:   Seated elbow flexion: 6lbs x 20  Standing knee flexion x 20 3#  LAQ x 15 B: 3lbs  Seated: hip Add: ball squeezes x 20   Seated rows: blue theraband 20x no performed  Seated shoulder press: 6#       Not Today:    Standing: marching x 15   6 inch step ups x 10 each leg  NP  Seated: triceps presses B: x 20, black  theraband   Resisted ambulation with Blue theraband x 5     neuromuscular re-education activities to improve: Balance, Coordination, Kinesthetic, Sense, and Proprioception for 30 minutes. The following activities were included:  Standing marching 3x2 min  - focus on ankle DF and 90/90 hip/knee flexion  - cues to reduce compensatory weight shift/trunk lean  Narrow base of support x8 min  - head turns  - resisting external perturbations from PT (multidirectional)  Stagger stance x5 min  - weight shifts  - head turns    Patient Education and Home Exercises     Home Exercises Provided and Patient Education Provided     Education provided:   - progression of exercises  - importance of building time during the day to perform some balance, walking. Attach exercises to things he does everyday at work.      Written Home Exercises Provided:    Mikey demonstrated good  understanding of the education provided. See EMR under Patient Instructions for exercises provided during therapy sessions    ASSESSMENT   Mikey demonstrates continued gait and static balance dysfunction as well as overall strength and endurance deficits which are contributing to increased risk for falls and overall decreased activity tolerance. He demonstrates good maintenance of previous strength gains and would likely benefit from increased performance of exercise outside of PT. Patient would benefit from continued skilled PT to ensure safety with performance of balance and functional strength training to maximize his mobility and reduce the risk of functional decline.    Pt prognosis is Fair.     Pt will continue to benefit from skilled outpatient physical therapy to address the deficits listed in the problem list box on initial evaluation, provide pt/family education and to maximize pt's level of independence in the home and community environment.     Pt's spiritual, cultural and educational needs considered and pt  agreeable to plan of care and goals.     Anticipated barriers to physical therapy: none    Goals:  Short Term Goals: 3-4 weeks   Increase hip flexion  strength by 1/2 grade, progressing, not met  Increase hip Abd strength by 1/2 grade, not met  Increase left ankle EV/IV by 1/2 grade, progressing, not met  Increase sit to  30 sec by 4 or more reps, progressing, not met  Decrease TUG by 2 secs or more, met     Long Term Goals: 8 weeks   Able to ambulate on [place] field w/o assisted device x 40 feet safely, progressing, not met  Increase sit to  30 sec to 13 or more, progressing, not met  Able to stand narrow stance w/o sway, progressing, not met  Able to ambulate with walking stick x 400 feet w/o break, progressing, not met  Increase LE strength to 4+/5 grossly, progressing, not met        Plan   Plan of care Certification: 12/27/2022 to 03/31/2023     Outpatient Physical Therapy 2 times weekly for 12 weeks to include the following interventions: Gait Training, Manual Therapy, Neuromuscular Re-ed, Patient Education, Self Care, Therapeutic Activities and Therapeutic Exercise.       Bryan Harrison, PT

## 2023-02-02 ENCOUNTER — CLINICAL SUPPORT (OUTPATIENT)
Dept: REHABILITATION | Facility: HOSPITAL | Age: 68
End: 2023-02-02
Attending: PSYCHIATRY & NEUROLOGY
Payer: MEDICARE

## 2023-02-02 DIAGNOSIS — R29.898 LEG WEAKNESS, BILATERAL: Primary | ICD-10-CM

## 2023-02-02 DIAGNOSIS — R26.9 GAIT DIFFICULTY: ICD-10-CM

## 2023-02-02 LAB
ALBUMIN/CREAT UR: 7.9 UG/MG (ref 0–30)
CREAT UR-MCNC: 63 MG/DL (ref 23–375)
MICROALBUMIN UR DL<=1MG/L-MCNC: 5 UG/ML

## 2023-02-02 PROCEDURE — 97110 THERAPEUTIC EXERCISES: CPT | Mod: PO,CQ

## 2023-02-02 PROCEDURE — 97112 NEUROMUSCULAR REEDUCATION: CPT | Mod: PO,CQ

## 2023-02-02 NOTE — PROGRESS NOTES
[facility]   Physical Therapy Treatment Note       Name: [facility] Number: [id number]    Therapy Diagnosis:   Encounter Diagnoses   Name Primary?    Leg weakness, bilateral Yes    Gait difficulty          Physician: Eloisa Frank,*    Visit Date: 2/2/2023    Physician Orders: PT Eval and Treat   Medical Diagnosis from Referral: E11.44 (ICD-10-CM) - Diabetic amyotrophy associated with type 2 diabetes mellitus      Evaluation Date: 11/9/2022  Authorization Period Expiration: 12/31/22  Plan of Care Expiration: 02/24/23  Visit # / Visits authorized: 3/20     Time In: 4:17 PM  Time Out: 5:20 PM  Total Billable Time: 63 minutes     Precautions: Standard, Diabetes and Fall        SUBJECTIVE     Pt reports:Pt states that he is feeling well,no new complaints   He was occasional compliance of HEP. But tries to stay active.   Response to previous treatment: did fine w/o increased pain/sorness  Functional change: none to date    Pain: 0/10  Location: bilateral LE      OBJECTIVE     Objective Measures updated at progress report unless specified.     Treatment     Mikey received the treatments listed below:      therapeutic exercises to develop strength, endurance, posture, and core stabilization for 30 minutes including:  Bike x 6 min seat 14 to help strength/endurance of quads/hamstrings  Treadmill walking for coordination and endurance: 0.9 to 1.4mph x 10 min incline 3 last 2min  - able to perform with intermittent unilateral upper extremity support  Leg press 30# x 15, RLE 20# x 5, LLE 10# x 5    Seated shoulder press: 6#   Seated elbow flexion: 6lbs x 20     neuromuscular re-education activities to improve: Balance, Coordination, Kinesthetic, Sense, and Proprioception for 30 minutes. The following activities were included:  Standing marching 3x2 min  - focus on ankle DF and 90/90 hip/knee flexion  - cues to reduce compensatory weight shift/trunk lean  Narrow base of support x8  min  - head turns  - resisting external perturbations from PT (multidirectional)  Stagger stance x5 min  - weight shifts  - head turns    Not Today:    Standing: marching x 15   6 inch step ups x 10 each leg NP  Seated: triceps presses B: x 20, black  theraband   Resisted ambulation with Blue theraband x 5     neuromuscular re-education activities to improve: Balance, Coordination, Kinesthetic, Sense, and Proprioception for 0 minutes. The following activities were included:      Patient Education and Home Exercises     Home Exercises Provided and Patient Education Provided     Education provided:   - progression of exercises    Written Home Exercises Provided:    Mikey demonstrated good  understanding of the education provided. See EMR under Patient Instructions for exercises provided during therapy sessions    ASSESSMENT   Mikey adryan today's tx with ther ex well. He was able to amb x 10 min with incline last 2 min with good endurance today.  Overall leg endurance was good today. He demonstrated fair bal with neuromuscular re-ed activities occasionally requiring UE A    Pt prognosis is Fair.     Pt will continue to benefit from skilled outpatient physical therapy to address the deficits listed in the problem list box on initial evaluation, provide pt/family education and to maximize pt's level of independence in the home and community environment.     Pt's spiritual, cultural and educational needs considered and pt agreeable to plan of care and goals.     Anticipated barriers to physical therapy: none      Goals:  Short Term Goals: 3-4 weeks   Increase hip flexion  strength by 1/2 grade, progressing, not met  Increase hip Abd strength by 1/2 grade, not met  Increase left ankle EV/IV by 1/2 grade, progressing, not met  Increase sit to  30 sec by 4 or more reps, progressing, not met  Decrease TUG by 2 secs or more, met     Long Term Goals: 8 weeks   Able to ambulate on tuff field w/o assisted device x 40 feet  safely, progressing, not met  Increase sit to  30 sec to 13 or more, progressing, not met  Able to stand narrow stance w/o sway, progressing, not met  Able to ambulate with walking stick x 400 feet w/o break, progressing, not met  Increase LE strength to 4+/5 grossly, progressing, not met        Plan   Plan of care Certification: 12/27/2022 to 02/24/22     Outpatient Physical Therapy 2 times weekly for 8 weeks to include the following interventions: Gait Training, Manual Therapy, Neuromuscular Re-ed, Patient Education, Self Care, Therapeutic Activities and Therapeutic Exercise.       Steve Coy, PTA

## 2023-02-07 ENCOUNTER — CLINICAL SUPPORT (OUTPATIENT)
Dept: REHABILITATION | Facility: HOSPITAL | Age: 68
End: 2023-02-07
Attending: PSYCHIATRY & NEUROLOGY
Payer: MEDICARE

## 2023-02-07 DIAGNOSIS — R26.9 GAIT DIFFICULTY: ICD-10-CM

## 2023-02-07 DIAGNOSIS — R29.898 LEG WEAKNESS, BILATERAL: Primary | ICD-10-CM

## 2023-02-07 PROCEDURE — 97112 NEUROMUSCULAR REEDUCATION: CPT | Mod: PO,CQ

## 2023-02-07 PROCEDURE — 97110 THERAPEUTIC EXERCISES: CPT | Mod: PO,CQ

## 2023-02-07 NOTE — PROGRESS NOTES
[facility]   Physical Therapy Treatment Note       Name: Mikey [place]  Clinic Number: [id number]    Therapy Diagnosis:   Encounter Diagnoses   Name Primary?    Leg weakness, bilateral Yes    Gait difficulty          Physician: Eloisa Frank,*    Visit Date: 2/7/2023    Physician Orders: PT Eval and Treat   Medical Diagnosis from Referral: E11.44 (ICD-10-CM) - Diabetic amyotrophy associated with type 2 diabetes mellitus      Evaluation Date: 11/9/2022  Authorization Period Expiration: 12/31/22  Plan of Care Expiration: 02/24/23  Visit # / Visits authorized: [id number]     Time In: 4:45 PM  Time Out: 5:30 PM  Total Billable Time: 45 minutes     Precautions: Standard, Diabetes and Fall        SUBJECTIVE     Pt reports:Pt states that he is feeling well,no new complaints   He was occasional compliance of HEP. But tries to stay active.   Response to previous treatment: did fine w/o increased pain/sorness  Functional change: none to date    Pain: 0/10  Location: bilateral LE      OBJECTIVE     Objective Measures updated at progress report unless specified.     Treatment     Mikey received the treatments listed below:      therapeutic exercises to develop strength, endurance, posture, and core stabilization for 35 minutes including:  Bike x 6 min seat 14 to help strength/endurance of quads/hamstrings  Treadmill walking  10 minfor coordination and endurance: 0.9 to 1.4mph x 10 min incline 3 last 2min  - able to perform with intermittent unilateral upper extremity support  Leg press 30# x 15, RLE 20# x 5, LLE 10# x 5   Matrix Multihip flex, ext 10# 15x B   Seated shoulder press: 6#  (NP)  Seated elbow flexion: 6lbs x 20 (NP)    neuromuscular re-education activities to improve: Balance, Coordination, Kinesthetic, Sense, and Proprioception for 10 minutes. The following activities were included:  Standing marching 3x2 min  - focus on ankle DF and 90/90 hip/knee flexion  - cues to reduce compensatory  weight shift/trunk lean  Not preformed today  Narrow base of support x8 min  - head turns  - resisting external perturbations from PT (multidirectional)  Stagger stance x5 min  - weight shifts  - head turns    Not Today:    Standing: marching x 15   6 inch step ups x 10 each leg NP  Seated: triceps presses B: x 20, black  theraband   Resisted ambulation with Blue theraband x 5     neuromuscular re-education activities to improve: Balance, Coordination, Kinesthetic, Sense, and Proprioception for 0 minutes. The following activities were included:      Patient Education and Home Exercises     Home Exercises Provided and Patient Education Provided     Education provided:   - progression of exercises    Written Home Exercises Provided:    Mikey demonstrated good  understanding of the education provided. See EMR under Patient Instructions for exercises provided during therapy sessions    ASSESSMENT   Mikey adryan today's tx with progression of ther ex well. He was able to perform hip strengthening exs on Matrix machine today without difficulty and may be able to increase resistance next visit.  Overall leg endurance was good today.   Pt prognosis is Fair.     Pt will continue to benefit from skilled outpatient physical therapy to address the deficits listed in the problem list box on initial evaluation, provide pt/family education and to maximize pt's level of independence in the home and community environment.     Pt's spiritual, cultural and educational needs considered and pt agreeable to plan of care and goals.     Anticipated barriers to physical therapy: none      Goals:  Short Term Goals: 3-4 weeks   Increase hip flexion  strength by 1/2 grade, progressing, not met  Increase hip Abd strength by 1/2 grade, not met  Increase left ankle EV/IV by 1/2 grade, progressing, not met  Increase sit to  30 sec by 4 or more reps, progressing, not met  Decrease TUG by 2 secs or more, met     Long Term Goals: 8 weeks   Able to  ambulate on [place] w/o assisted device x 40 feet safely, progressing, not met  Increase sit to  30 sec to 13 or more, progressing, not met  Able to stand narrow stance w/o sway, progressing, not met  Able to ambulate with walking stick x 400 feet w/o break, progressing, not met  Increase LE strength to 4+/5 grossly, progressing, not met        Plan   Plan of care Certification: 12/27/2022 to 02/24/22     Outpatient Physical Therapy 2 times weekly for 8 weeks to include the following interventions: Gait Training, Manual Therapy, Neuromuscular Re-ed, Patient Education, Self Care, Therapeutic Activities and Therapeutic Exercise.       Steve Coy, PTA

## 2023-02-09 ENCOUNTER — CLINICAL SUPPORT (OUTPATIENT)
Dept: REHABILITATION | Facility: HOSPITAL | Age: 68
End: 2023-02-09
Attending: PSYCHIATRY & NEUROLOGY
Payer: MEDICARE

## 2023-02-09 DIAGNOSIS — R26.9 GAIT DIFFICULTY: ICD-10-CM

## 2023-02-09 DIAGNOSIS — R29.898 LEG WEAKNESS, BILATERAL: Primary | ICD-10-CM

## 2023-02-09 PROCEDURE — 97110 THERAPEUTIC EXERCISES: CPT | Mod: PO,CQ

## 2023-02-09 PROCEDURE — 97112 NEUROMUSCULAR REEDUCATION: CPT | Mod: PO,CQ

## 2023-02-09 NOTE — PROGRESS NOTES
[facility]   Physical Therapy Treatment Note       Name: Mikey [place]  Clinic Number: [id number]    Therapy Diagnosis:   Encounter Diagnoses   Name Primary?    Leg weakness, bilateral Yes    Gait difficulty          Physician: Eloisa Frank,*    Visit Date: 2/9/2023    Physician Orders: PT Eval and Treat   Medical Diagnosis from Referral: E11.44 (ICD-10-CM) - Diabetic amyotrophy associated with type 2 diabetes mellitus      Evaluation Date: 11/9/2022  Authorization Period Expiration: 12/31/22  Plan of Care Expiration: 02/24/23  Visit # / Visits authorized: [id number]     Time In: 4:15 PM  Time Out: 5:00 PM  Total Billable Time: 45 minutes     Precautions: Standard, Diabetes and Fall        SUBJECTIVE     Pt reports:Pt states that he is again feeling well,no new complaints. He states that he continues to have mm soreness the day post each tx.  He was occasional compliance of HEP. But tries to stay active.   Response to previous treatment: did fine w/o increased pain/sorness  Functional change: none to date    Pain: 0/10  Location: bilateral LE      OBJECTIVE     Objective Measures updated at progress report unless specified.     Treatment     Mikey received the treatments listed below:      therapeutic exercises to develop strength, endurance, posture, and core stabilization for 35 minutes including:  Bike x 6 min seat 14 to help strength/endurance of quads/hamstrings  Treadmill walking  10 minfor coordination and endurance: 0.9 to 1.4mph x 10 min incline 3 last 2min  - able to perform with intermittent unilateral upper extremity support  Leg press 30# x 15, RLE 20# x 5, LLE 10# x 5   Matrix Multihip flex, ext 10# 15x B   Seated shoulder press: 6#  (NP)  Seated elbow flexion: 6lbs x 20 (NP)    neuromuscular re-education activities to improve: Balance, Coordination, Kinesthetic, Sense, and Proprioception for 10 minutes. The following activities were included:  Standing marching 3x2 min 2#  -  focus on ankle DF and 90/90 hip/knee flexion  - cues to reduce compensatory weight shift/trunk lean    Not preformed today  Narrow base of support x8 min  - head turns  - resisting external perturbations from PT (multidirectional)  Stagger stance x5 min  - weight shifts  - head turns    Not Today:    Standing: marching x 15   6 inch step ups x 10 each leg NP  Seated: triceps presses B: x 20, black  theraband   Resisted ambulation with Blue theraband x 5     neuromuscular re-education activities to improve: Balance, Coordination, Kinesthetic, Sense, and Proprioception for 0 minutes. The following activities were included:      Patient Education and Home Exercises     Home Exercises Provided and Patient Education Provided     Education provided:   - progression of exercises    Written Home Exercises Provided:    Mikey demonstrated good  understanding of the education provided. See EMR under Patient Instructions for exercises provided during therapy sessions    ASSESSMENT   Mikey adryan today's tx with ther ex well. He was able to perform hip strengthening exs today with addition of resistance without difficulty .  Overall leg endurance was good today.   Pt prognosis is Fair.     Pt will continue to benefit from skilled outpatient physical therapy to address the deficits listed in the problem list box on initial evaluation, provide pt/family education and to maximize pt's level of independence in the home and community environment.     Pt's spiritual, cultural and educational needs considered and pt agreeable to plan of care and goals.     Anticipated barriers to physical therapy: none      Goals:  Short Term Goals: 3-4 weeks   Increase hip flexion  strength by 1/2 grade, progressing, not met  Increase hip Abd strength by 1/2 grade, not met  Increase left ankle EV/IV by 1/2 grade, progressing, not met  Increase sit to  30 sec by 4 or more reps, progressing, not met  Decrease TUG by 2 secs or more, met     Long Term  Goals: 8 weeks   Able to ambulate on tuff field w/o assisted device x 40 feet safely, progressing, not met  Increase sit to  30 sec to 13 or more, progressing, not met  Able to stand narrow stance w/o sway, progressing, not met  Able to ambulate with walking stick x 400 feet w/o break, progressing, not met  Increase LE strength to 4+/5 grossly, progressing, not met        Plan   Plan of care Certification: 12/27/2022 to 02/24/22     Outpatient Physical Therapy 2 times weekly for 8 weeks to include the following interventions: Gait Training, Manual Therapy, Neuromuscular Re-ed, Patient Education, Self Care, Therapeutic Activities and Therapeutic Exercise.       Steve Coy, PTA

## 2023-02-14 ENCOUNTER — CLINICAL SUPPORT (OUTPATIENT)
Dept: REHABILITATION | Facility: HOSPITAL | Age: 68
End: 2023-02-14
Attending: PSYCHIATRY & NEUROLOGY
Payer: MEDICARE

## 2023-02-14 DIAGNOSIS — R29.898 LEG WEAKNESS, BILATERAL: Primary | ICD-10-CM

## 2023-02-14 DIAGNOSIS — R26.9 GAIT DIFFICULTY: ICD-10-CM

## 2023-02-14 PROCEDURE — 97110 THERAPEUTIC EXERCISES: CPT | Mod: PO

## 2023-02-14 PROCEDURE — 97112 NEUROMUSCULAR REEDUCATION: CPT | Mod: PO

## 2023-02-14 NOTE — PROGRESS NOTES
[facility]   Physical Therapy Treatment Note       Name: Mikey [place]  Clinic Number: [id number]    Therapy Diagnosis:   Encounter Diagnoses   Name Primary?    Leg weakness, bilateral Yes    Gait difficulty        Physician: Eloisa Frank,*    Visit Date: 2/14/2023    Physician Orders: PT Eval and Treat   Medical Diagnosis from Referral: E11.44 (ICD-10-CM) - Diabetic amyotrophy associated with type 2 diabetes mellitus      Evaluation Date: 11/9/2022  Authorization Period Expiration: 12/31/22  Plan of Care Expiration: 02/24/23  Visit # / Visits authorized: [id number]     Time In: 4:15 PM (patient late)  Time Out: 5:08 PM  Total Billable Time: 53 minutes     Precautions: Standard, Diabetes and Fall        SUBJECTIVE     Pt reports:Has been able to walk to his break room ~130 feet during work. He is doing some of his HEP 3 times during the work day.    He was occasional compliance of HEP. But tries to stay active.   Response to previous treatment: did fine w/o increased pain/sorness  Functional change: none to date    Pain: 0/10  Location: bilateral LE      OBJECTIVE     Objective Measures updated at progress report unless specified.     Strength    Right LE Left LE   Hip flexion 4-/5 3+/5   Hip extension        Hip abduction 4-/5 3+/5   Hip adduction 4+/5 4/+5   Knee flexion 4+/5 4+/5   Knee extension 4+/5 4+/5           Peroneals 4/5 4-/5   Tibialis anterior 4/5 4-/5   Tibialis posterior 4+/5 4/5   Gatroc/soleus 4+/5 4+/5       Treatment     Mikey received the treatments listed below:      For next visit:  Continue working on gait mechanics and balance. Trial of step-ups or step-over low obstacles.    therapeutic exercises to develop strength, endurance, posture, and core stabilization for 15 minutes including:  Bike x 6 min seat 14 to help strength/endurance of quads/hamstrings (NP)    Treadmill walking 10 min for coordination and endurance: 0.9 to 1.0 mph x 10 min incline increasing  0.5-1.0 every 2-3 minutes, @ LVL 3 last 2 min  - able to perform with intermittent unilateral upper extremity support  - demonstrates increased compensated trendelenburg gate without right upper extremity support    Ambulation in clinic 2x125 feet   - cues to reduce compensatory trunk lean (more apparent on right)    Not performed today:  Leg press 30# x 15, RLE 20# x 5, LLE 10# x 5   Matrix Multihip flex, ext 10# 15x B   Seated shoulder press: 6#  (NP)  Seated elbow flexion: 6lbs x 20 (NP)    neuromuscular re-education activities to improve: Balance, Coordination, Kinesthetic, Sense, and Proprioception for 38 minutes. The following activities were included:  Standing rest between sets due to fatigue.    Standing marching 2x2 min   - focus on ankle DF and 90/90 hip/knee flexion  - cues to reduce compensatory weight shift/trunk lean  Hip 4-way with red Theraband x10 each direction, each leg  - cues to avoid locking out the knees, reduce trunk lean  Standing ankle DF on decline wedge (foot starting in relative PF) 2x10 bilaterally     Narrow base of support x5 min  - head turns  - resisting external perturbations from PT (multidirectional)  Stagger stance x3 min  - weight shifts  - head turns    Not Today:    Standing: marching x 15   6 inch step ups x 10 each leg NP  Seated: triceps presses B: x 20, black  theraband   Resisted ambulation with Blue theraband x 5     Patient Education and Home Exercises     Home Exercises Provided and Patient Education Provided     Education provided:   - progression of exercises    Written Home Exercises Provided:    Mikey demonstrated good  understanding of the education provided. See EMR under Patient Instructions for exercises provided during therapy sessions    ASSESSMENT   Mikey demonstrates improved hip flexor, abductor strength with manual muscle testing. He tolerated increased overall walking at today's session and good fatigue in hips and legs with therapeutic exercise and  balance challenges. He will benefit from increased core coordination, lumbar and lower extremity stability to allow for improved ambulation for community distances and decreased fall risk.    Pt prognosis is Fair.     Pt will continue to benefit from skilled outpatient physical therapy to address the deficits listed in the problem list box on initial evaluation, provide pt/family education and to maximize pt's level of independence in the home and community environment.     Pt's spiritual, cultural and educational needs considered and pt agreeable to plan of care and goals.     Anticipated barriers to physical therapy: none      Goals:  Short Term Goals: 3-4 weeks   Increase hip flexion  strength by 1/2 grade, progressing, not met  Increase hip Abd strength by 1/2 grade, not met  Increase left ankle EV/IV by 1/2 grade, progressing, not met  Increase sit to  30 sec by 4 or more reps, progressing, not met  Decrease TUG by 2 secs or more, met     Long Term Goals: 8 weeks   Able to ambulate on [place] field w/o assisted device x 40 feet safely, progressing, not met  Increase sit to  30 sec to 13 or more, progressing, not met  Able to stand narrow stance w/o sway, progressing, not met  Able to ambulate with walking stick x 400 feet w/o break, progressing, not met  Increase LE strength to 4+/5 grossly, progressing, not met        Plan   Plan of care Certification: 12/27/2022 to 02/24/22     Outpatient Physical Therapy 2 times weekly for 8 weeks to include the following interventions: Gait Training, Manual Therapy, Neuromuscular Re-ed, Patient Education, Self Care, Therapeutic Activities and Therapeutic Exercise.       Bryan Harrison, PT

## 2023-03-03 ENCOUNTER — CLINICAL SUPPORT (OUTPATIENT)
Dept: REHABILITATION | Facility: HOSPITAL | Age: 68
End: 2023-03-03
Attending: PSYCHIATRY & NEUROLOGY
Payer: MEDICARE

## 2023-03-03 DIAGNOSIS — R29.898 LEG WEAKNESS, BILATERAL: Primary | ICD-10-CM

## 2023-03-03 DIAGNOSIS — R26.9 GAIT DIFFICULTY: ICD-10-CM

## 2023-03-03 PROCEDURE — 97116 GAIT TRAINING THERAPY: CPT | Mod: PO,CQ

## 2023-03-03 PROCEDURE — 97110 THERAPEUTIC EXERCISES: CPT | Mod: PO,CQ

## 2023-03-03 NOTE — PROGRESS NOTES
[facility]   Physical Therapy Treatment Note       Name: Mikey [place]  Clinic Number: [id number]    Therapy Diagnosis:   Encounter Diagnoses   Name Primary?    Leg weakness, bilateral Yes    Gait difficulty        Physician: Eloisa Frank,*    Visit Date: 3/3/2023    Physician Orders: PT Eval and Treat   Medical Diagnosis from Referral: E11.44 (ICD-10-CM) - Diabetic amyotrophy associated with type 2 diabetes mellitus      Evaluation Date: 11/9/2022  Authorization Period Expiration: 12/31/22  Plan of Care Expiration: 02/24/23  Visit # / Visits authorized: [id number]     Time In: 4:15 PM (patient late)  Time Out: 5:08 PM  Total Billable Time: 53 minutes     Precautions: Standard, Diabetes and Fall        SUBJECTIVE     Pt reports:was able to walk the length of his building 3 times today.    He was occasional compliance of HEP. But tries to stay active.   Response to previous treatment: did fine w/o increased pain/sorness  Functional change: none to date    Pain: 0/10  Location: bilateral LE      OBJECTIVE     Objective Measures updated at progress report unless specified.         Treatment     Mikey received the treatments listed below:      For next visit:  Continue working on gait mechanics and balance. Trial of step-ups or step-over low obstacles.    therapeutic exercises to develop strength, endurance, posture, and core stabilization for 15 minutes including:  Bike x 6 min seat 14 to help strength/endurance of quads/hamstrings (NP)    Gait x 48 min  Treadmill walking 10 min for coordination and endurance: 0.9 to 1.0 mph x 10 min incline increasing 0.5-1.0 every 2-3 minutes, @ LVL 3 last 2 min  - able to perform with intermittent unilateral upper extremity support  - demonstrates increased compensated trendelenburg gate without right upper extremity support    Ambulation in clinic 2x125 feet   - cues to reduce compensatory trunk lean (more apparent on right)   Stepping over 6 6 inch  hurdles with HHA with VCs for posture and head position to improve bal. He has less difficulty when leading with L LE and standing on R LE. Improved bal with VCs to eliminate fwd head posturen  Step-ups on 4 inch step 25x each    Ther ex x 10 min  Hip 4-way with red Theraband x10 each direction, each leg  - cues to avoid locking out the knees, reduce trunk lean    Not performed today:  Leg press 30# x 15, RLE 20# x 5, LLE 10# x 5   Matrix Multihip flex, ext 10# 15x B   Seated shoulder press: 6#  (NP)  Seated elbow flexion: 6lbs x 20 (NP)    neuromuscular re-education activities to improve: Balance, Coordination, Kinesthetic, Sense, and Proprioception for 00 minutes. The following activities were included:  Standing rest between sets due to fatigue.    Standing marching 2x2 min   - focus on ankle DF and 90/90 hip/knee flexion  - cues to reduce compensatory weight shift/trunk lean  Standing ankle DF on decline wedge (foot starting in relative PF) 2x10 bilaterally     Narrow base of support x5 min  - head turns  - resisting external perturbations from PT (multidirectional)  Stagger stance x3 min  - weight shifts  - head turns    Not Today:    Standing: marching x 15   6 inch step ups x 10 each leg NP  Seated: triceps presses B: x 20, black  theraband   Resisted ambulation with Blue theraband x 5     Patient Education and Home Exercises     Home Exercises Provided and Patient Education Provided     Education provided:   - progression of exercises    Written Home Exercises Provided:    Mikey demonstrated good  understanding of the education provided. See EMR under Patient Instructions for exercises provided during therapy sessions    ASSESSMENT   Mikey adryan today's tx with focus on gt training well with some evidence of fatigue with stepping over hurdles. He demonstrates poor bal and required UE A on PTA's shld. He showed some improvement with bal and pace with VCs for proper head posture. He tolerated increased overall  walking at today's session with fatigue in hips and legs with therapeutic exercise and balance challenges. He will benefit from increased core coordination, lumbar and lower extremity stability to allow for improved ambulation for community distances and decreased fall risk.    Pt prognosis is Fair.     Pt will continue to benefit from skilled outpatient physical therapy to address the deficits listed in the problem list box on initial evaluation, provide pt/family education and to maximize pt's level of independence in the home and community environment.     Pt's spiritual, cultural and educational needs considered and pt agreeable to plan of care and goals.     Anticipated barriers to physical therapy: none      Goals:  Short Term Goals: 3-4 weeks   Increase hip flexion  strength by 1/2 grade, progressing, not met  Increase hip Abd strength by 1/2 grade, not met  Increase left ankle EV/IV by 1/2 grade, progressing, not met  Increase sit to  30 sec by 4 or more reps, progressing, not met  Decrease TUG by 2 secs or more, met     Long Term Goals: 8 weeks   Able to ambulate on Internff field w/o assisted device x 40 feet safely, progressing, not met  Increase sit to  30 sec to 13 or more, progressing, not met  Able to stand narrow stance w/o sway, progressing, not met  Able to ambulate with walking stick x 400 feet w/o break, progressing, not met  Increase LE strength to 4+/5 grossly, progressing, not met        Plan   Plan of care Certification: 12/27/2022 to 02/24/22     Outpatient Physical Therapy 2 times weekly for 8 weeks to include the following interventions: Gait Training, Manual Therapy, Neuromuscular Re-ed, Patient Education, Self Care, Therapeutic Activities and Therapeutic Exercise.       Steve Coy, PTA

## 2023-03-06 ENCOUNTER — PROCEDURE VISIT (OUTPATIENT)
Dept: NEUROLOGY | Facility: CLINIC | Age: 68
End: 2023-03-06
Payer: MEDICARE

## 2023-03-06 DIAGNOSIS — E11.44 DIABETIC AMYOTROPHY ASSOCIATED WITH TYPE 2 DIABETES MELLITUS: ICD-10-CM

## 2023-03-06 DIAGNOSIS — R74.8 ELEVATED CK: ICD-10-CM

## 2023-03-06 DIAGNOSIS — M54.10 RADICULOPATHY, UNSPECIFIED SPINAL REGION: Primary | ICD-10-CM

## 2023-03-06 PROCEDURE — 95886 MUSC TEST DONE W/N TEST COMP: CPT | Mod: PBBFAC,PO | Performed by: PSYCHIATRY & NEUROLOGY

## 2023-03-06 PROCEDURE — 95886 PR EMG COMPLETE, W/ NERVE CONDUCTION STUDIES, 5+ MUSCLES: ICD-10-PCS | Mod: 26,S$PBB,, | Performed by: PSYCHIATRY & NEUROLOGY

## 2023-03-06 PROCEDURE — 95886 MUSC TEST DONE W/N TEST COMP: CPT | Mod: 26,S$PBB,, | Performed by: PSYCHIATRY & NEUROLOGY

## 2023-03-06 PROCEDURE — 95911 PR NERVE CONDUCTION STUDY; 9-10 STUDIES: ICD-10-PCS | Mod: 26,S$PBB,, | Performed by: PSYCHIATRY & NEUROLOGY

## 2023-03-06 PROCEDURE — 95911 NRV CNDJ TEST 9-10 STUDIES: CPT | Mod: 26,S$PBB,, | Performed by: PSYCHIATRY & NEUROLOGY

## 2023-03-06 PROCEDURE — 95911 NRV CNDJ TEST 9-10 STUDIES: CPT | Mod: PBBFAC,PO | Performed by: PSYCHIATRY & NEUROLOGY

## 2023-03-07 ENCOUNTER — PATIENT MESSAGE (OUTPATIENT)
Dept: NEUROLOGY | Facility: CLINIC | Age: 68
End: 2023-03-07
Payer: MEDICARE

## 2023-03-21 ENCOUNTER — TELEPHONE (OUTPATIENT)
Dept: NEUROLOGY | Facility: CLINIC | Age: 68
End: 2023-03-21
Payer: MEDICARE

## 2023-03-21 ENCOUNTER — OFFICE VISIT (OUTPATIENT)
Dept: NEUROLOGY | Facility: CLINIC | Age: 68
End: 2023-03-21
Payer: MEDICARE

## 2023-03-21 ENCOUNTER — CLINICAL SUPPORT (OUTPATIENT)
Dept: REHABILITATION | Facility: HOSPITAL | Age: 68
End: 2023-03-21
Attending: PSYCHIATRY & NEUROLOGY
Payer: MEDICARE

## 2023-03-21 ENCOUNTER — HOSPITAL ENCOUNTER (OUTPATIENT)
Dept: RADIOLOGY | Facility: HOSPITAL | Age: 68
Discharge: HOME | End: 2023-03-21
Attending: PSYCHIATRY & NEUROLOGY
Payer: MEDICARE

## 2023-03-21 VITALS
SYSTOLIC BLOOD PRESSURE: 165 MMHG | RESPIRATION RATE: 17 BRPM | BODY MASS INDEX: 29.61 KG/M2 | WEIGHT: 206.38 LBS | HEART RATE: 65 BPM | DIASTOLIC BLOOD PRESSURE: 71 MMHG

## 2023-03-21 DIAGNOSIS — R29.898 LEG WEAKNESS, BILATERAL: Primary | ICD-10-CM

## 2023-03-21 DIAGNOSIS — Z79.4 TYPE 2 DIABETES MELLITUS WITH DIABETIC POLYNEUROPATHY, WITH LONG-TERM CURRENT USE OF INSULIN: ICD-10-CM

## 2023-03-21 DIAGNOSIS — R53.1 WEAKNESS: ICD-10-CM

## 2023-03-21 DIAGNOSIS — R74.8 HYPERCKEMIA: ICD-10-CM

## 2023-03-21 DIAGNOSIS — M21.372 LEFT FOOT DROP: Primary | ICD-10-CM

## 2023-03-21 DIAGNOSIS — R26.9 GAIT DIFFICULTY: ICD-10-CM

## 2023-03-21 DIAGNOSIS — G60.9 IDIOPATHIC PERIPHERAL NEUROPATHY: ICD-10-CM

## 2023-03-21 DIAGNOSIS — E11.42 TYPE 2 DIABETES MELLITUS WITH DIABETIC POLYNEUROPATHY, WITH LONG-TERM CURRENT USE OF INSULIN: ICD-10-CM

## 2023-03-21 DIAGNOSIS — M21.372 LEFT FOOT DROP: ICD-10-CM

## 2023-03-21 PROCEDURE — 97110 THERAPEUTIC EXERCISES: CPT | Mod: PO,CQ

## 2023-03-21 PROCEDURE — 99999 PR PBB SHADOW E&M-EST. PATIENT-LVL V: ICD-10-PCS | Mod: PBBFAC,,, | Performed by: PSYCHIATRY & NEUROLOGY

## 2023-03-21 PROCEDURE — 72114 XR LUMBAR SPINE 5 VIEW WITH FLEX AND EXT: ICD-10-PCS | Mod: 26,,, | Performed by: RADIOLOGY

## 2023-03-21 PROCEDURE — 99215 OFFICE O/P EST HI 40 MIN: CPT | Mod: S$PBB,,, | Performed by: PSYCHIATRY & NEUROLOGY

## 2023-03-21 PROCEDURE — 72114 X-RAY EXAM L-S SPINE BENDING: CPT | Mod: 26,,, | Performed by: RADIOLOGY

## 2023-03-21 PROCEDURE — 97116 GAIT TRAINING THERAPY: CPT | Mod: PO,CQ

## 2023-03-21 PROCEDURE — 97750 PHYSICAL PERFORMANCE TEST: CPT | Mod: PO

## 2023-03-21 PROCEDURE — 99999 PR PBB SHADOW E&M-EST. PATIENT-LVL V: CPT | Mod: PBBFAC,,, | Performed by: PSYCHIATRY & NEUROLOGY

## 2023-03-21 PROCEDURE — 72114 X-RAY EXAM L-S SPINE BENDING: CPT | Mod: TC,FY,PO

## 2023-03-21 PROCEDURE — 99215 OFFICE O/P EST HI 40 MIN: CPT | Mod: PBBFAC,PO | Performed by: PSYCHIATRY & NEUROLOGY

## 2023-03-21 PROCEDURE — 99215 PR OFFICE/OUTPT VISIT, EST, LEVL V, 40-54 MIN: ICD-10-PCS | Mod: S$PBB,,, | Performed by: PSYCHIATRY & NEUROLOGY

## 2023-03-21 NOTE — PROGRESS NOTES
"NEUROLOGY  Outpatient Follow Up  [facility]  [address] [facility] [address], [place] [address]  [phone] (office) / [phone] (fax)    Patient Name:  Mikey Ramirez  :  1955  MR #:  [id number]  Acct #:  [id number]    Date of Neurology Visit: 2023  Name of Neurologist: Eloisa Frank D.O, ABPN, AOBNP, ABEM    Other Physicians:  Branden Gallardo MD (Primary Care Physician); No ref. provider found (Referring)      Chief Complaint: Extremity Weakness      History of Present Illness (HPI):  Mikey Ramirez is a 67 y.o. male here for follow up of weakness.    Patient states he is weak.  He feels he has very limited mobility.  He states he used to be able to walk longer distances, but now he struggles to go that distance due to fatigue in the muscles.  He feels this is with everything he does.  Even standing for 10-15 min will wear him out.  He uses a cane when walking.      He feels a little off balance when he first initiates walking.  He is very cautious, especially in crowds. He has not fallen.  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  At work he has to walk a very long distance which is very difficult for him.  He is not tripping.      He has numbness in the lower extremities.  He feels tingling and will feel his foot hitting the ground.  He also has a spongy feeling when his foot is on the ground.  He has enough feeling that he can drive.      He has no muscle cramps.      He has some tremors.  He will use 2 hands when writing or printing because it is "squiggly".  He does not notice a tremor as much with drinking out of a cup.  He can fill his cup to the brim and be ok.      He has noted no difference in chewing or swallowing.  No change in speech.      He has noted a drop in his appetite.  He can go all day without eating.  He has discussed this with his medical doctor.    He is no longer taking on cholesterol lowering medication, he states this has been almost a year now.  His CK " has remained elevated.    He has some back discomfort.  He states it doesn't last long.      Interval Hx  12/5/22 with Caity Hammer NP  Mikey Ramirez is a 67 y.o.  male seen in follow up for diabetic amyotrophy and diabetic peripheral neuropathy.   Medical history is otherwise significant for DM2, GERD, HLD, HTN  Here with his wife.   I haven't seen him since May. He was doing PT and felt that he was regaining some strength at that time. His insurance changed shortly after and he had to stop PT for 2 months. He resumed therapy last week and will be going 2x per week.   He tires easily. Feels like his muscles get stiff and tight. No myalgia. He has to take frequent rest breaks. Using cane. No falls. Has to use his arms to stand from a chair. Has 3 steps to get into his home, but no other stairs. No significant change in arm or finger strength. Has some trouble opening bottles or jars. No proximal arm weakness. No dysphagia.   Same tingling in his feet and fingertips. Well controlled on gabapentin. Took a break from Rx for a few days and noted recurrence and pain. Taking 300 mg TID.   Stopped rosuvastatin about 1 month ago due to persistently elevated CK.   Last A1C was 8.1 (from 8.9) about 1 month ago. PCP wants him to start SSI regimen, but he is waiting to see if he can get approved for a CBG monitor. Not seeing endocrine.      5/16/22 with Caity Hammer NP  Mikey Ramirez is a 67 y.o.  male seen in follow up for diabetic amyotrophy and peripheral neuropathy. The patient is new to me today, but recently established with Dr. Frank  Medical history is otherwise significant for DM2, GERD, HLD, HTN  Here with his wife.   He is doing PT. He feels that his leg strength has improved a bit. Still notes significant weakness in the upper legs as prior (difficulty rising from chair or standing up from floor). 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   Still very sensitive to touch in his upper  "thighs. Pain is tolerable on gabapentin. He is taking 300 mg BID or TID without notable SE.   Still feels same sense of imbalance. Mainly has trouble if the texture of the ground changes or with any change in slope. He has not had any falls. Using the cane helps him feel more stable.   Still has same numbness in his feet and tingling in his fingertips. No appreciable weakness in the hands or UE.   He has upcoming repeat labs to check his A1C. Monitoring BG daily and notes improvement. Hopeful that his A1C continues to decrease. Managed by his PCP.      HPI 3/15/22 with Dr. Zac DO  "Mikey Ramirez is a 66 y.o. male referred by Dr. Burkett for consultation regarding an abnormal EMG and weakness.  Patient states he has a history of diabetes.  He let it go for a year or more.  He states he lost a significant amount of weight and started to become very weak early 2021.  He doesn't recall any significant groin or hip pain at that time.  Prior to this he had no lower extremity symptoms.  He states he had no numbness and tingling.  He can't specifically identify the timing of the weakness and the numbness 2 years ago, ie which came first.  His wife mentions that family saw him a few months before this and commented that he looked ill and was getting very thin (thought he had cancer).  He also started to notice significant loss of strength in the lower extremities from the waist down.  He was started on his medications for diabetes after getting re-established with a PCP.  He started gaining weight and getting his glucose under control.  His strength is not improving.  He feels unsteady on his feet, particularly right after he stands up.  If he stops, he has trouble starting again as well.  He is currently using a cane.  He states the imbalance and unsteadiness is resolved just by touching something.  He states he can move his legs, but standing is very problematic.  He has numbness in his feet, but not up the legs.  He " "states his gait is sloppy.  He can't get up a single step without using handrails.  He can't get up from the ground when he plays with his dogs.   He has to lift his legs up into bed or in the car.  He has to use his arms to stand from a chair.  He can't get in and out of the boat to go fishing.  He is very careful and is wary of falling.  He has not fallen.  He is not tripping.  He feels very unsteady on uneven ground.  He denies any issues in the upper extremities.  He has tingling in the fingers.  They feel cold.    He has no muscle cramps.  He has no spasms.    He has tremor in his upper limbs, the left arm more than the right.    He has no changes in his speech.    He has a prostate history, but on medication he denies any bladder issues.  He has no bowel issues currently.  The constipation issues he was having have resolved after changing his pain medications.    He has pain that comes and goes in the muscles.  He has extreme sensitivity to the skin of his thighs.    He has no history of cancer or chemotherapy. He does drink ETOH, but rarely.  He has no family history of any neurological diseases.    Due to the pain, he borrowed his wife's gabapentin to help.  He couldn't sleep due to the pain.  The gabapentin helped."    Treatment to date:      Review of Systems:    See HPI    Past Medical, Surgical, Family & Social History:   Reviewed and updated.    Home Medications:     Current Outpatient Medications:     aspirin (ECOTRIN) 81 MG EC tablet, Take 81 mg by mouth once daily., Disp: , Rfl:     blood sugar diagnostic (FREESTYLE LITE STRIPS) Strp, USE TO CHECK BLOOD GLUCOSE ONCE DAILY, Disp: 200 strip, Rfl: 3    blood-glucose meter kit, To check BG 1 times daily, to use with insurance preferred meter. ICD10: E11., Disp: 1 each, Rfl: 1    blood-glucose meter,continuous (DEXCOM G6 ) Misc, Use as directed, Disp: 1 each, Rfl: 1    bumetanide (BUMEX) 0.5 MG Tab, Take 1 tablet (0.5 mg total) by mouth once " "daily., Disp: 90 tablet, Rfl: 3    flash glucose scanning reader (FREESTYLE COURTNEY 14 DAY READER) Misc, use As directed, Disp: 2 each, Rfl: 3    flash glucose sensor (FREESTYLE COURTNEY 14 DAY SENSOR) Kit, As directed, Disp: 6 kit, Rfl: 3    gabapentin (NEURONTIN) 300 MG capsule, Take 1 capsule (300 mg total) by mouth 3 (three) times daily., Disp: 270 capsule, Rfl: 3    insulin lispro (HUMALOG KWIKPEN INSULIN) 100 unit/mL pen, Inject 10 Units into the skin 3 (three) times daily with meals., Disp: 27 mL, Rfl: 3    lisinopriL (PRINIVIL,ZESTRIL) 20 MG tablet, Take 1 tablet (20 mg total) by mouth once daily., Disp: 90 tablet, Rfl: 3    LUTEIN ORAL, Take by mouth., Disp: , Rfl:     SURE COMFORT PEN NEEDLE 32 gauge x 1/4" Ndle, USE 1 PEN NEEDLE AS NEEDED, Disp: 100 each, Rfl: 3    insulin (LANTUS SOLOSTAR U-100 INSULIN) glargine 100 units/mL SubQ pen, Inject 52 Units into the skin once daily., Disp: 48 mL, Rfl: 1    lancets (FREESTYLE LANCETS) 28 gauge Misc, TO CHECK BLOOD GLUCOSE ONCE DAILY (Patient not taking: Reported on 1/25/2023), Disp: 100 each, Rfl: 2    Physical Examination:  BP (!) 165/71 (BP Location: Left arm, Patient Position: Sitting, BP Method: Medium (Automatic))   Pulse 65   Resp 17   Wt 93.6 kg (206 lb 5.6 oz)   BMI 29.61 kg/m²     GENERAL:  General appearance: Well, non-toxic appearing.  No apparent distress.  Extremities: normal.    MENTAL STATUS:  Alertness, attention span & concentration: normal.  Language: normal.  Orientation to self, place & time:  normal.  Memory, recent & remote: normal.  Fund of knowledge: normal.     SPEECH:  Clear and fluent.  Follows complex commands.     CRANIAL NERVES:  Cranial Nerves II-XII were examined.  II - Visual fields: normal.  III, IV, VI: PERRL, EOMI, No ptosis, No nystagmus.  V - Facial sensation: normal.  VII - Face symmetry & mobility: normal.  VIII - Hearing: normal.  IX, X - Palate: mobile & midline.  XI - Shoulder shrug: normal.  XII - Tongue protrusion: " normal.     GROSS MOTOR:  Gait & station: slight steppage gait, able to walk on toes and heels when holding wall.  Able to ambulate without cane, but feels more steady with it.    Tone: normal, no spasticity or rigidity.  Abnormal movements: bilateral upper extremity tremor, mainly intention type.  Finger-nose & Heel-knee-shin: normal.  Rapid alternating movements & drift: normal.     MUSCLE STRENGTH:      Fascics Atrophy RIGHT      LEFT Atrophy Fascics       5 Neck Ext. 5           5 Neck Flex 5           5 Deltoids 5           5 Sh.Ext.Rot. 5           5 Sh.Int.Rot. 5           5 Biceps 5           5 Triceps 5           5 Forearm.Pr. 5           5 Wrist Ext. 5           5 Wrist Flex 5           5 Finger Ext. 5           5 Finger Flex 5           5 FPL 5           5 Inteross. 5                                           4 Iliopsoas 4           5 Hip Abduct 5           5 Hip Adduct 5           5 Quads 5           5 Hams 5           5 Dorsiflex 4           5 Plantar Flex 5           5 Ankle Clovis 4           5 Ankle Invert 5           5 Toe Ext. 4           5 Toe Flex 5                                          REFLEXES:     RIGHT Reflex    LEFT   tr Biceps tr   tr Brachiorad. tr   tr Triceps tr   0 Pectoralis 0   0 Jaw Jerk 0   0 Webb's 0           0 Patellar tr   0 Ankle 0   0 Suprapatellar 0                   Down PLANTAR Down            SENSORY:  Light touch: Normal throughout.  Sharp touch: patchy sensory changes in the distal lower extremities as well as left lateral thigh.  Vibration: stocking pattern loss of vibration, improves at ankle then normal at the knees.  UE normal        Diagnostic Data Reviewed:   Referral records were reviewed.      EMG with Dr. 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  Needle EMG sampling of the muscles throughout the bilateral upper extremities showed findings  of active axonal denervation and/or chronic neurogenic change.  Findings were similar to the previous EMG study from 01/24/2022.  Abnormal findings also include the cervical, lumbar, and thoracic paraspinal musculature.  Large duration motor units and reduced recruitment were observed in several muscles during needle EMG sampling of the upper extremities, which is indicative of a neuropathic type process.     EMG with Dr. 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  Needle EMG sampling of the bilateral lower extremities revealed abnormal findings in all muscles sampled.  Abnormal spontaneous activity was observed in all muscles.  Considering the diffuse nature of abnormalities, a diagnosis for a definitive neuro pathologic process cannot be made at this time.  Potential etiologies include motor neuron disease, bilateral lumbosacral plexopathy (including diabetic amyotrophy), and/or a combination of multilevel lumbosacral radiculopathy and severe peripheral polyneuropathy.  While motor unit analysis revealed findings more suggestive of a neuropathic type process, a myopathic process is also possibility, although lower on the differential. Correlation required to dictate further testing.     Component      Latest Ref Rng & Units 1/5/2022   Sodium      136 - 145 mmol/L 136   Potassium      3.5 - 5.1 mmol/L 4.4   Chloride      95 - 110 mmol/L 104   CO2      23 - 29 mmol/L 23   Glucose      70 - 110 mg/dL 153 (H)   BUN      8 - 23 mg/dL 17   Creatinine      0.5 - 1.4 mg/dL 0.6   Calcium      8.7 - 10.5 mg/dL 9.4   PROTEIN TOTAL      6.0 - 8.4 g/dL 5.9 (L)   Albumin      3.5 - 5.2 g/dL 3.3 (L)   BILIRUBIN TOTAL      0.1 - 1.0 mg/dL 0.4   Alkaline Phosphatase      55 - 135 U/L 66   AST      10 - 40 U/L 37   ALT      10 - 44 U/L 39   Anion Gap      8 - 16 mmol/L 9   eGFR if African American      >60  mL/min/1.73 m:2 >60.0   eGFR if non African American      >60 mL/min/1.73 m:2 >60.0   WBC      3.90 - 12.70 K/uL 5.15   RBC      4.60 - 6.20 M/uL 4.23 (L)   Hemoglobin      14.0 - 18.0 g/dL 12.3 (L)   Hematocrit      40.0 - 54.0 % 36.7 (L)   MCV      82 - 98 fL 87   MCH      27.0 - 31.0 pg 29.1   MCHC      32.0 - 36.0 g/dL 33.5   RDW      11.5 - 14.5 % 13.1   Platelets      150 - 450 K/uL 345   MPV      9.2 - 12.9 fL 10.8   TSH      0.400 - 4.000 uIU/mL 1.491   CPK      20 - 200 U/L 474 (H)         Component      Latest Ref Rng & Units 1/5/2022 9/22/2021 6/10/2021 2/26/2018   Hemoglobin A1C External      4.0 - 5.6 % 10.1 (H) 10.7 (H) >14.0 (H) 11.3 (H)   Estimated Avg Glucose      68 - 131 mg/dL 243 (H) 260 (H) Unable to calculate 278 (H)         Assessment and Plan:  Mikey Ramirez is a 67 y.o. man with a history of poorly controlled DM managed by his medical doctor who presents with symptoms of progressive weakness, gait imbalance, and sensory changes.  His progression has been less than a year, though he noted symptoms as much as 2 years ago.  On exam he has low reflexes, loss of strength, tremor and patchy sensory changes.     His EMG reveals a lot of active denervation suggesting there is a superimposed precess and this may not be as simple as diabetic neuropathy alone.       We had a long discussion today and reviewed that this could be a disease of the nerves, muscles or both.  We reviewed that there were concerns this is progressive, but unfortunately this is the case for most neuropathies regardless of cause.     He has a left foot drop and is in PT currently.  He has not been advised about an AFO at this time.  Xray lumbar spine ordered today, consider MRI lumbar spine depending on findings.    Elevated CK warrants a muscle bx in light of his EMG and weakness.    Peripheral neuropathy with elevated A1c.  Likely playing a significant role, but doubt this is the complete source of his symptoms.    Information on  patient AVS:  Will get blood work to look further at your neuropathy as well as your weakness.    Will get a muscle biopsy of the left lateral thigh with neurosurgery at [facility] ([facility]).    Will get an xray of your lumbar spine.  Will consider an MRI based on these results.    Continue with therapy.  Will see if getting an AFO (ankle-foot-orthotic) for the left ankle might help your walking and energy when walking.      Follow up in 2 months.      Important to note, also  has a past medical history of GERD (gastroesophageal reflux disease), High cholesterol, Hypertension, and Type 2 diabetes mellitus.    Time Spent: I spent a total of 54 minutes on the day of the visit.This includes face to face time and non-face to face time preparing to see the patient (eg, review of tests), Obtaining and/or reviewing separately obtained history, Documenting clinical information in the electronic or other health record, Independently interpreting resultsand communicating results to the patient/family/caregiver, or Care coordination.      Eloisa Frank D.O, ABPN, AOBNP, ABEM     This note was created with voice recognition software.  Grammatical, syntax and spelling errors may be inevitable.

## 2023-03-21 NOTE — PLAN OF CARE
[facility]   Physical Therapy Progress and Plan of Care UPDATE       Name: Mikey Ramirez  Clinic Number: [id number]    Therapy Diagnosis:   Encounter Diagnoses   Name Primary?    Leg weakness, bilateral Yes    Gait difficulty      Physician: Eloisa Frank,*    Visit Date: 1/31/2023    Physician Orders: PT Eval and Treat   Medical Diagnosis from Referral: E11.44 (ICD-10-CM) - Diabetic amyotrophy associated with type 2 diabetes mellitus      Evaluation Date: 11/9/2022  Authorization Period Expiration: 12/31/22  Plan of Care Expiration: 06/14/2023 (additional 12 weeks)  Visit # / Visits authorized: 12/20     Time In: 1730  Time Out: 8645  Total Billable Time: 15 minutes one-on-one with PT or PTA     Precautions: Standard, Diabetes and Fall        SUBJECTIVE     Pt reports: Continued left>right weakness and fatigue that is limiting his ADLs, walking, and overall activity tolerance. He is undergoing additional neurological testing to better diagnose, but neurologist recommends continued PT.    Response to previous treatment: Typically sore the next day, especially with treadmill walking  Functional change: Slow progression, but limited overall walking/activity improvements    Pain: 0/10  Location: bilateral LE      OBJECTIVE     Significant strength deficits with hip flexion and ankle DF on left.     GAIT DEVIATIONS: Mikey amb with decreased velocity of limb motion, decreased step length, decreased stride length, increased stride width and decreased weight-shifting ability.with walking stick and decreased balance, steppage type gait. Limited hip drive - relying on trunk lean and excessive weight shift to progress limb.     Decreased standing balance with narrow stance with increased sway with Eyes open and closed, able to  staggered stance with Supervision  - visible trembling in bilateral lower extremity  - lack of stepping strategy     Strength    Right LE Left LE   Hip flexion  4/5 3-/5   Hip extension   NT  NT   Hip abduction 4-/5 3/5   Hip adduction 4+/5 4/+5   Knee flexion 4+/5 4+/5   Knee extension 4+/5 4/5           Peroneals 4/5 4-/5   Tibialis anterior 4/5 4-/5   Tibialis posterior 4+/5 4/5   Gatroc/soleus 4+/5 4+/5         Pt/family was provided educational information, including: role of PT, goals for PT, scheduling - pt verbalized understanding. Discussed insurance plan with pt.      CMS Impairment/Limitation/Restriction for FOTO LE Survey     Therapist reviewed FOTO scores for Mikey Ramirez on 12/27/2022.   FOTO documents entered into [employer] - see Media section.     Limitation Score: not collected this visit                 Treatment   See treatment note    Patient Education and Home Exercises     Home Exercises Provided and Patient Education Provided     Education provided:   - progression of exercises  - importance of building time during the day to perform some balance, walking. Attach exercises to things he does everyday at work.      Written Home Exercises Provided:    Mikey demonstrated good  understanding of the education provided. See EMR under Patient Instructions for exercises provided during therapy sessions    ASSESSMENT   Mikey demonstrates improvement in hip flexor strength and general maintenance of lower extremity strength with testing at today's session. Stressed that regardless of his eventual diagnosis, he has GAINED strength in the kalani he has really been focusing on. He still demonstrates gait and static balance dysfunction as well as overall strength and endurance deficits which are contributing to increased risk for falls and overall decreased activity tolerance. Patient would benefit from continued skilled PT to ensure safety with performance of balance and functional strength training to maximize his mobility and reduce the risk of functional decline.    Pt prognosis is Fair.     Pt will continue to benefit from skilled outpatient physical therapy to address the  deficits listed in the problem list box on initial evaluation, provide pt/family education and to maximize pt's level of independence in the home and community environment.     Pt's spiritual, cultural and educational needs considered and pt agreeable to plan of care and goals.     Anticipated barriers to physical therapy: none    Goals:  Short Term Goals: 3-4 weeks   Increase hip flexion  strength by 1/2 grade, progressing, not met  Increase hip Abd strength by 1/2 grade, not met  Increase left ankle EV/IV by 1/2 grade, progressing, not met  Increase sit to  30 sec by 4 or more reps, progressing, not met  Decrease TUG by 2 secs or more, met     Long Term Goals: 8 weeks   Able to ambulate on tuff field w/o assisted device x 40 feet safely, progressing, not met  Increase sit to  30 sec to 13 or more, progressing, not met  Able to stand narrow stance w/o sway, progressing, not met  Able to ambulate with walking stick x 400 feet w/o break, progressing, not met  Increase LE strength to 4+/5 grossly, progressing, not met        Plan   Plan of care Certification: 12/27/2022 to 06/14/2023     Outpatient Physical Therapy 2 times weekly for additional 12 weeks weeks to include the following interventions: Gait Training, Manual Therapy, Neuromuscular Re-ed, Patient Education, Self Care, Therapeutic Activities and Therapeutic Exercise.       Bryan Harrison, PT

## 2023-03-21 NOTE — PATIENT INSTRUCTIONS
Will get blood work to look further at your neuropathy as well as your weakness.    Will get a muscle biopsy of the left lateral thigh with neurosurgery at [facility] ([facility]).    Will get an xray of your lumbar spine.  Will consider an MRI based on these results.    Continue with therapy.  Will see if getting an AFO (ankle-foot-orthotic) for the left ankle might help your walking and energy when walking.      Follow up in 2 months.

## 2023-03-22 ENCOUNTER — PATIENT MESSAGE (OUTPATIENT)
Dept: NEUROSURGERY | Facility: CLINIC | Age: 68
End: 2023-03-22
Payer: MEDICARE

## 2023-03-22 ENCOUNTER — TELEPHONE (OUTPATIENT)
Dept: NEUROSURGERY | Facility: CLINIC | Age: 68
End: 2023-03-22
Payer: MEDICARE

## 2023-03-24 ENCOUNTER — CLINICAL SUPPORT (OUTPATIENT)
Dept: REHABILITATION | Facility: HOSPITAL | Age: 68
End: 2023-03-24
Attending: PSYCHIATRY & NEUROLOGY
Payer: MEDICARE

## 2023-03-24 ENCOUNTER — TELEPHONE (OUTPATIENT)
Dept: NEUROSURGERY | Facility: CLINIC | Age: 68
End: 2023-03-24
Payer: MEDICARE

## 2023-03-24 DIAGNOSIS — R29.898 LEG WEAKNESS, BILATERAL: Primary | ICD-10-CM

## 2023-03-24 DIAGNOSIS — R26.9 GAIT DIFFICULTY: ICD-10-CM

## 2023-03-24 PROCEDURE — 97110 THERAPEUTIC EXERCISES: CPT | Mod: PO,CQ

## 2023-03-24 PROCEDURE — 97116 GAIT TRAINING THERAPY: CPT | Mod: PO,CQ

## 2023-03-24 NOTE — PROGRESS NOTES
[facility] OUTPATIENT THERAPY AND WELLNESS   Physical Therapy Treatment Note       Name: Mikey Hoopa  Clinic Number: [id number]    Therapy Diagnosis:   No diagnosis found.      Physician: Eloisa Frank,*    Visit Date: 3/24/2023    Physician Orders: PT Eval and Treat   Medical Diagnosis from Referral: E11.44 (ICD-10-CM) - Diabetic amyotrophy associated with type 2 diabetes mellitus      Evaluation Date: 11/9/2022  Authorization Period Expiration: 12/31/22  Plan of Care Expiration: 02/24/23  Visit # / Visits authorized: 8/20     Time In: 4:32 PM   Time Out: 5:33 PM  Total Billable Time: 61 minutes     Precautions: Standard, Diabetes and Fall        SUBJECTIVE     Pt reports:no new issues today.    He was occasional compliance of HEP. But tries to stay active.   Response to previous treatment: did fine w/o increased pain/sorness  Functional change: none to date    Pain: 0/10  Location: bilateral LE      OBJECTIVE     Objective Measures updated at progress report unless specified.         Treatment     Mikey received the treatments listed below:      For next visit:  Continue working on gait mechanics and balance. .    therapeutic exercises to develop strength, endurance, posture, and core stabilization for 15 minutes including:  Bike x 6 min seat 14 to help strength/endurance of quads/hamstrings (NP)    Gait x 30 min  Treadmill walking 10 min for coordination and endurance: 0.8 to 1.6 mph x 10 min  increasing  every 2-3 minutes,   - able to perform with intermittent unilateral upper extremity support  - demonstrates increased compensated trendelenburg gate without right upper extremity support  Ambulation in clinic 2x125 feet   - cues to reduce compensatory trunk lean (more apparent on right)   Stepping over 6 6 inch hurdles with CGA with VCs for posture and head position to improve bal. He has less difficulty when leading with L LE and standing on R LE. Improved bal with VCs to eliminate fwd head posture   Step-ups  on 4 inch step 25x each NP    Ther ex x 30 min  Hip 4-way with red Theraband x10 each direction, each leg NP  - cues to avoid locking out the knees, reduce trunk lean    Leg press 30# x 15, RLE 20# x 5, LLE 10# x 5   Matrix Multihip flex, ext 25# ABD 10# 20x B   Seated shoulder press: 6#  (NP)  Seated elbow flexion: 6lbs x 20 (NP)    neuromuscular re-education activities to improve: Balance, Coordination, Kinesthetic, Sense, and Proprioception for 00 minutes. The following activities were included:  Standing marching 2x2 min   - focus on ankle DF and 90/90 hip/knee flexion  - cues to reduce compensatory weight shift/trunk lean  Standing ankle DF on decline wedge (foot starting in relative PF) 2x10 bilaterally     Narrow base of support x5 min  - head turns  - resisting external perturbations from PT (multidirectional)  Stagger stance x3 min  - weight shifts  - head turns    Not Today:    Standing: marching x 15   6 inch step ups x 10 each leg NP  Seated: triceps presses B: x 20, black  theraband   Resisted ambulation with Blue theraband x 5     Patient Education and Home Exercises     Home Exercises Provided and Patient Education Provided     Education provided:   - progression of exercises    Written Home Exercises Provided:    Mikey demonstrated good  understanding of the education provided. See EMR under Patient Instructions for exercises provided during therapy sessions    ASSESSMENT   Mikey adryan today's tx with focus on amb, hip flexor, hip abductor and hip extensors well, however he does continue to display evidence of fatigue and requires rest breaks during and between activities. He does show some improved form and bal with amb over hurdles with VCs for posture. He will benefit from increased core coordination, lumbar and lower extremity stability to allow for improved ambulation for community distances and decreased fall risk.    Pt prognosis is Fair.     Pt will continue to benefit from skilled outpatient  physical therapy to address the deficits listed in the problem list box on initial evaluation, provide pt/family education and to maximize pt's level of independence in the home and community environment.     Pt's spiritual, cultural and educational needs considered and pt agreeable to plan of care and goals.     Anticipated barriers to physical therapy: none      Goals:  Short Term Goals: 3-4 weeks   Increase hip flexion  strength by 1/2 grade, progressing, not met  Increase hip Abd strength by 1/2 grade, not met  Increase left ankle EV/IV by 1/2 grade, progressing, not met  Increase sit to  30 sec by 4 or more reps, progressing, not met  Decrease TUG by 2 secs or more, met     Long Term Goals: 8 weeks   Able to ambulate on tuff field w/o assisted device x 40 feet safely, progressing, not met  Increase sit to  30 sec to 13 or more, progressing, not met  Able to stand narrow stance w/o sway, progressing, not met  Able to ambulate with walking stick x 400 feet w/o break, progressing, not met  Increase LE strength to 4+/5 grossly, progressing, not met        Plan   Plan of care Certification: 12/27/2022 to 02/24/22     Outpatient Physical Therapy 2 times weekly for 8 weeks to include the following interventions: Gait Training, Manual Therapy, Neuromuscular Re-ed, Patient Education, Self Care, Therapeutic Activities and Therapeutic Exercise.       Steve Coy, PTA

## 2023-03-27 NOTE — PROCEDURES
[facility]  [facility] [place] [facility]  [address] [facility] [address]  [place] [address]  [phone]      Full Name: Mikey Ramirez Gender: Male  Patient ID: [id number] YOB: 1955      Visit Date: 3/6/2023 10:33 AM  Age: 67 Years  Examining Physician: Eloisa Frank D.O., ABPN, AOBNP, ABEM   Referring Physician: Caity Hammer NP   Height: 5 feet 10 inch  History: Patient with history of diabetic neuropathy and left lower extremity diabetic amyotrophy.  Continuing to struggle with weakness, in fact feeling like he has regressed some.  Evaluate the left upper and lower extremity.      Sensory NCS      Nerve / Sites Rec. Site Onset Lat Peak Lat NP Amp Segments Distance Velocity Temp.     ms ms µV  cm m/s °C   L Median - Digit II (Antidromic)      Mid palm Dig II NR NR NR Mid palm - Dig II 7 NR 32      Ref.   ?3.40 ?15.0 Ref.      L Ulnar - Digit V (Antidromic)      Wrist Dig V NR NR NR Wrist - Dig V 11 NR 32      Ref.   ?3.20 ?10.0 Ref.  ?50    L Radial - Anatomical snuff box (Forearm)      Forearm Wrist NR NR NR Forearm - Wrist 10 NR 32      Ref.   ?2.80 ?10.0 Ref.      L Sural - Ankle (Calf)      Calf Ankle NR NR NR Calf - Ankle 14 NR 30      Ref.   ?4.60 ?3.0 Ref.  ?40    L Superficial peroneal - Ankle      Lat leg Ankle NR NR NR Lat leg - Ankle 12 NR 30      Ref.   ?4.60 ?3.0 Ref.          Motor NCS      Nerve / Sites Muscle Latency Ref. Amplitude Ref. Amp % Duration Segments Distance Lat Diff Velocity Ref. Temp.     ms ms mV mV % ms  cm ms m/s m/s °C   L Median - APB      Wrist APB 4.98 ?4.00 6.2 ?5.0 100 7.50 Wrist - APB     32      Elbow APB 11.96  5.2  84.2 7.75 Elbow - Wrist 26 6.98 37 ?50 32   L Ulnar - ADM      Wrist ADM 3.40 ?3.10 4.4 ?7.0 100 7.25 Wrist - ADM     32      B.Elbow ADM 10.67  3.4  77.1 7.52 B.Elbow - Wrist 26 7.27 36 ?50 32      A.Elbow ADM 13.31  3.3  75.5  A.Elbow - B.Elbow 11 2.65 42  32   L Peroneal - EDB      Ankle EDB NR ?6.00 NR ?2.5 NR NR Ankle - EDB      30   L Peroneal - Tib Ant      Fib Head Tib Ant 5.77 ?4.50 1.1 ?3.0 100 10.35 Fib Head - Tib Ant     30      Pop fossa Tib Ant 8.38  1.2  106 9.90 Pop fossa - Fib Head 10 2.60 38 ?40 30   L Tibial - AH      Ankle AH NR ?6.00 NR ?4.0 NR NR Ankle - AH     30       EMG Summary Table     Spontaneous Recruitment Activation Duration Amplitude Polyphasia Comment   Muscle Ins Act Fib Fasc Pattern - - - - -   L. First dorsal interosseous Inc +2 0 Mod Dec Normal +1 +1 N/+1 Normal   L. Abductor pollicis brevis Inc +2 0 Mod Dec Normal +1 +1 +1 Normal   L. Pronator teres Inc Occ 0 Sl Dec Normal N/+1 N/+1 N/+1 Normal   L. Extensor digitorum communis Inc +1 0 Sl Dec Normal +1 +1 +1 CRD   L. Biceps brachii Normal 0 0 Sl Dec Normal Normal Normal +1 Normal   L. Triceps brachii Inc Rare Occ Sl Dec Normal N/+1 +1 +1 Normal   L. Deltoid Inc +1 0 Sl Dec Normal N/+1 N/+1 +1 CRD   L. Tibialis anterior Inc +2 0 Mod Dec Normal +1 +1 +2 CRD   L. Gastrocnemius (Medial head) Inc +2 Occ Mod Dec Normal +1 +1 +2 CRD   L. Extensor hallucis longus Inc +2 0 Mk Dec Fair +2 +2 +2 Normal   L. Vastus medialis Inc +1 0 Sl Dec Normal +1 +1 +1 CRD   L. Tensor fasciae latae Inc +1 0 Mod Dec Normal +1 +1 +2 CRD   L. Biceps femoris (short head) Normal 0 0 Sl Dec Normal +1 Normal +1 Normal   L. Cervical paraspinals Normal 0 0      Normal   L. Lumbar paraspinals Normal 0 0      Normal   L. Adductor longus Inc +1 0 Sl Dec Normal N/+1 Normal +1 CRD         Mikey Ramirez [id number] 3/6/2023 10:33 AM     5 of 5    Summary: Nerve conduction studies were performed on the left upper and lower extremities.  Left median, ulnar, radial, sural and superficial peroneal sensory responses were absent.  Left median motor response was prolonged with normal amplitude and slowing of the distal velocity.  Left ulnar motor response was reduced in amplitude with slowing of the  distal velocity and prolonged latency.  Left peroneal motor response recording the extensor digitorum brevis  muscle was absent.  Left peroneal motor response recording the tibialis anterior muscle was reduced in amplitude with a prolonged latency and borderline slowing of the velocity.  Left tibial motor  response was absent.  Needle EMG was performed in the left upper and lower extremities as well as the paraspinal muscles.  Active denervation was present in the  left 1st dorsal interosseous, abductor pollicis brevis, pronator teres, extensor digitorum communis, triceps, deltoid, tibialis anterior, medial gastrocnemius, extensor hallucis longus, vastus medialis, tensor fascia mercy, and abductor longus muscles.  Motor units were large, long and poly phasic with reduced recruitment in the left 1st dorsal interosseous, abductor pollicis brevis, pronator teres, extensor digitorum communis, triceps, deltoid, tibialis anterior, medial gastrocnemius, extensor hallucis longus, vastus medialis, tensor fascia mercy muscles.  Motor units were enlarged and or poly phasic with reduced recruitment in the left biceps, short head of the biceps femoris, and abductor longus muscles.   Fair activation was noted in the left extensor hallucis longus muscle.    Impression:   This is a markedly abnormal and complex EMG of the left upper and lower extremities.  The findings are as follows:  Chronic, severe, axonal, peripheral polyneuropathy with active denervation.  Due to the degree of peripheral neuropathy it is difficult to discern if there is any further evidence of plexopathy on the study.  Clinical correlation is advised.  In addition, due to the degree of neuropathy it is also difficult to definitively identify the presence of radiculopathy.  There is evidence on this study that is suggestive but not diagnostic of a left C6 radiculopathy as well as a left L5-S1 radiculopathy.  Clinical correlation is advised.  There is no evidence of myopathy on the study.  There is no definitive evidence of focal neuropathy on the study.      Thank you  for referring to the [facility] in [place]. Please feel free to contact the clinic if you have any further questions regarding this study or report.       _____________________________  Eloisa Frank D.O., ABPN, AOBNP, ABJOAQUIN Ramirez [id number] 3/6/2023 10:33 AM     5 of 5

## 2023-03-28 ENCOUNTER — TELEPHONE (OUTPATIENT)
Dept: NEUROLOGY | Facility: CLINIC | Age: 68
End: 2023-03-28
Payer: MEDICARE

## 2023-03-28 ENCOUNTER — PATIENT MESSAGE (OUTPATIENT)
Dept: NEUROLOGY | Facility: CLINIC | Age: 68
End: 2023-03-28
Payer: MEDICARE

## 2023-03-28 DIAGNOSIS — R74.8 HYPERCKEMIA: ICD-10-CM

## 2023-03-28 DIAGNOSIS — R53.1 WEAKNESS: Primary | ICD-10-CM

## 2023-03-28 DIAGNOSIS — R29.898 LEG WEAKNESS, BILATERAL: ICD-10-CM

## 2023-03-28 DIAGNOSIS — G60.9 IDIOPATHIC PERIPHERAL NEUROPATHY: ICD-10-CM

## 2023-03-28 NOTE — TELEPHONE ENCOUNTER
----- Message from Isidra Boone sent at 3/24/2023 12:11 PM CDT -----  There was an issue with the heavy metal test ordered  03/21/2023.  The specimen was clotted.  The ordered has been cancelled and will need to be reordered and recollected.  If there are any questions please call the sendout laboratory. Anyone in the sendout lab will be able to assist you.

## 2023-04-06 DIAGNOSIS — D89.2 PARAPROTEINEMIA: Primary | ICD-10-CM

## 2023-04-06 DIAGNOSIS — D75.839 THROMBOCYTOSIS: ICD-10-CM

## 2023-04-07 ENCOUNTER — TELEPHONE (OUTPATIENT)
Dept: HEMATOLOGY/ONCOLOGY | Facility: CLINIC | Age: 68
End: 2023-04-07
Payer: MEDICARE

## 2023-04-07 NOTE — TELEPHONE ENCOUNTER
Attempted to contact pt regarding referral placed to see [facility], call went straight to voicemail. Will reach out again.

## 2023-04-10 ENCOUNTER — TELEPHONE (OUTPATIENT)
Dept: HEMATOLOGY/ONCOLOGY | Facility: CLINIC | Age: 68
End: 2023-04-10
Payer: MEDICARE

## 2023-04-10 NOTE — TELEPHONE ENCOUNTER
LVM for pt to call back to schedule from hem referral.   Pt should be schedule with MD only due to dx of Paraproteinemia,thrombocytosis.

## 2023-04-11 ENCOUNTER — TELEPHONE (OUTPATIENT)
Dept: HEMATOLOGY/ONCOLOGY | Facility: CLINIC | Age: 68
End: 2023-04-11
Payer: MEDICARE

## 2023-04-11 ENCOUNTER — PATIENT MESSAGE (OUTPATIENT)
Dept: ADMINISTRATIVE | Facility: HOSPITAL | Age: 68
End: 2023-04-11
Payer: MEDICARE

## 2023-04-11 NOTE — TELEPHONE ENCOUNTER
2nd attempt to contact the pt to Replaced by [facility] from hem referral.   LVM

## 2023-04-12 ENCOUNTER — TELEPHONE (OUTPATIENT)
Dept: HEMATOLOGY/ONCOLOGY | Facility: CLINIC | Age: 68
End: 2023-04-12
Payer: MEDICARE

## 2023-04-12 NOTE — TELEPHONE ENCOUNTER
Several attempts to contact the pt to scheduled from hem referral.  LVM x's 4.  Referring physician will be notified.

## 2023-05-11 ENCOUNTER — CLINICAL SUPPORT (OUTPATIENT)
Dept: REHABILITATION | Facility: HOSPITAL | Age: 68
End: 2023-05-11
Attending: PSYCHIATRY & NEUROLOGY
Payer: MEDICARE

## 2023-05-11 DIAGNOSIS — R26.9 GAIT DIFFICULTY: ICD-10-CM

## 2023-05-11 DIAGNOSIS — R29.898 LEG WEAKNESS, BILATERAL: Primary | ICD-10-CM

## 2023-05-11 PROCEDURE — 97164 PT RE-EVAL EST PLAN CARE: CPT | Mod: PO

## 2023-05-11 NOTE — PROGRESS NOTES
[facility]   Physical Therapy Reassessment Note       Name: Mikey Ramirez  [facility] Number: [id number]    Therapy Diagnosis:   Encounter Diagnoses   Name Primary?    Leg weakness, bilateral Yes    Gait difficulty        Physician: Eloisa Frank,*    Visit Date: 5/11/2023    Physician Orders: PT Eval and Treat   Medical Diagnosis from Referral: E11.44 (ICD-10-CM) - Diabetic amyotrophy associated with type 2 diabetes mellitus      Evaluation Date: 11/9/2022  Authorization Period Expiration: 12/31/22  Plan of Care Expiration: 07/11/2023  Visit # / Visits authorized: 9/20     Time In: 3:33 PM   Time Out: 4:20 PM  Total Billable Time: 47 minutes     Precautions: Standard, Diabetes and Fall        SUBJECTIVE     Pt reports: He stopped exercises after prior testing with previous PT. Does not have a lot of strength in PF/DF on L side more than R, Hip flexor strength on L more than R, and R quad more than L. Pt has noticed his strength in his legs during gait has worsened as he is relying more on his cane than he did previously.     He was occasional compliance of HEP. But tries to stay active.   Response to previous treatment: did fine w/o increased pain/sorness  Functional change: none to date    Pain: 0/10  Location: bilateral LE      OBJECTIVE     GAIT DEVIATIONS: Mikey amb with decreased velocity of limb motion, decreased step length, decreased stride length, increased stride width and decreased weight-shifting ability.with walking stick and decreased balance, steppage type gait     Decreased standing balance with narrow stance with increased sway with Eyes open and closed, unable to  staggered stance     30 sec sit to stand test: 7  TUG: 15 sec average with cane  6 MWT: 786.9 feet with cane     Strength    Right LE Left LE   Hip flexion 4/5 4-/5   Hip extension   3-/5  3/5   Hip abduction 3+/5 3-/5   Hip adduction 4/5 4/5   Knee flexion 4+/5 4+/5   Knee extension 4/5 4-/5            Peroneals 4/5 4-/5   Tibialis anterior 4/5 4-/5   Tibialis posterior 4+/5 3+/5   Gatroc/soleus 4+/5 4+/5         ROM: shoulder     AROM Right Left Comment   Shoulder Flexion: WNL WNL     Shoulder Abduction: WNL WNL     Shoulder ER: WNL WNL     Shoulder Ir:  WNL WNL     *pain     Strength:        Right Left Comment   Shoulder flexion: 4-/5 3+/5     Shoulder Abduction: 4-/5 4-/5     Shoulder ER: 4/5 4/5     Shoulder IR: 4/5 4/5     Lower Trap: 3+/5 3/5     Middle Trap: 4-/5 4-/5                     Reflexes  Patellar: right/left  0/trace  Achilles: right/left:0/0        Palpation: + TTP quads        Treatment     Mikey received the treatments listed below:      Re-Evaluation only.    therapeutic exercises to develop strength, endurance, posture, and core stabilization for 00 minutes including:  NA    neuromuscular re-education activities to improve: Balance, Coordination, Kinesthetic, Sense, and Proprioception for 00 minutes. The following activities were included:  NA    Patient Education and Home Exercises     Home Exercises Provided and Patient Education Provided     Education provided:   - progression of exercises    Written Home Exercises Provided:    Mikey demonstrated good  understanding of the education provided. See EMR under Patient Instructions for exercises provided during therapy sessions    ASSESSMENT   Mikey was Re-evaluated by physical therapist today. He has not attended therapy in last 2 months due to medical testing and assessments. He demonstrates loss of BUE strength since last evaluation, slight decrease in BLE strength, more frequent use of SPC for stability during gait, poor to fair balance on firm surfaces, poor balance on variable surfaces and vision eliminated tasks, and poor endurance. He ambulates with use of cane with flat foot at contact, wide base of support, and decrease in stride length. He is at greater risk of falls based on balance testing, strength, and gait pattern. He will benefit  from increased core coordination, lumbar and lower extremity stability to allow for improved ambulation for community distances and decreased fall risk.    Pt prognosis is Fair.     Pt will continue to benefit from skilled outpatient physical therapy to address the deficits listed in the problem list box on initial evaluation, provide pt/family education and to maximize pt's level of independence in the home and community environment.     Pt's spiritual, cultural and educational needs considered and pt agreeable to plan of care and goals.     Anticipated barriers to physical therapy: none      Goals:  Short Term Goals: 3-4 weeks   Increase hip flexion  strength by 1/2 grade, progressing, not met  Increase hip Abd strength by 1/2 grade, not met  Increase left ankle EV/IV by 1/2 grade, progressing, not met  Increase sit to  30 sec by 1-2 or more reps, progressing, not met  Decrease TUG by 2 secs or more, met     Long Term Goals: 8 weeks   Able to ambulate on Independent IP field w/o assisted device x 40 feet safely, progressing, not met  Increase sit to  30 sec to 10 or more, progressing, not met  Able to stand narrow stance w/o sway, progressing, not met  Able to ambulate with walking stick x 400 feet w/o break, progressing, not met  Increase LE strength to 4+/5 grossly, progressing, not met        Plan   Plan of care Certification: 5/11/2023-7/11/2023     Outpatient Physical Therapy 2 times weekly for 8 weeks to include the following interventions: Gait Training, Manual Therapy, Neuromuscular Re-ed, Patient Education, Self Care, Therapeutic Activities and Therapeutic Exercise.       Angie Stroud, PT                                             PAST MEDICAL HISTORY:  Anxiety and depression     GERD (gastroesophageal reflux disease)     Neoplasm of sigmoid colon

## 2023-05-19 ENCOUNTER — CLINICAL SUPPORT (OUTPATIENT)
Dept: REHABILITATION | Facility: HOSPITAL | Age: 68
End: 2023-05-19
Attending: PSYCHIATRY & NEUROLOGY
Payer: MEDICARE

## 2023-05-19 DIAGNOSIS — R26.9 GAIT DIFFICULTY: ICD-10-CM

## 2023-05-19 DIAGNOSIS — R29.898 LEG WEAKNESS, BILATERAL: Primary | ICD-10-CM

## 2023-05-19 PROCEDURE — 97110 THERAPEUTIC EXERCISES: CPT | Mod: PO,CQ

## 2023-05-19 NOTE — PROGRESS NOTES
[facility]   Physical Therapy Treatment Note     Name: Mikey [facility] Number: [id number]    Therapy Diagnosis:   Encounter Diagnoses   Name Primary?    Leg weakness, bilateral Yes    Gait difficulty      Physician: Eloisa Frank,*    Visit Date: 5/19/2023  Physician Orders: PT Eval and Treat   Medical Diagnosis from Referral: E11.44 (ICD-10-CM) - Diabetic amyotrophy associated with type 2 diabetes mellitus      Evaluation Date: 11/9/2022  Authorization Period Expiration: 12/31/22  Plan of Care Expiration: 07/11/2023  Visit # / Visits authorized: 10/20     Time In: 4:20 PM   Time Out: 5:25 PM  Total Billable Time: 65 minutes     Precautions: Standard, Diabetes and Fall        SUBJECTIVE     Pt reports: that he is doing well today.  He was not compliant with home exercise program.  Response to previous treatment: no adverse effects   Functional change: none to date    Pain: 0/10  Location:       OBJECTIVE     Objective Measures updated at progress report unless specified.     Treatment     Mikey received the treatments listed below:      therapeutic exercises to develop strength, endurance, ROM, flexibility, posture, and core stabilization for 65 minutes including:  Nu-step 10 min L2  GSS on incline 2 min  LAQ 3# 20x  SLR 20x  Bridging 20x  SL hip ABD 20x  Supine hip ADD with ball 20x  Mini Squat 20x  Heel Raise 20x  Standing hip ext 20x    neuromuscular re-education activities to improve: Balance, Coordination, Kinesthetic, Sense, Proprioception, and Posture for 00 minutes. The following activities were included:      therapeutic activities to improve functional performance for 00  minutes, including:      gait training to improve functional mobility and safety for 00  minutes, including:        Patient Education and Home Exercises     Home Exercises Provided and Patient Education Provided     Education provided:   - importance of activity  -maintenance of glucose  levels    Written Home Exercises Provided: Patient instructed to cont prior HEP. Exercises were reviewed and Mikey was able to demonstrate them prior to the end of the session.  Mikey demonstrated good  understanding of the education provided. See EMR under Patient Instructions for exercises provided during therapy sessions    ASSESSMENT     Mikey adryan today's tx with ther ex fair due to fatigue and de-conditioning. He required rest breaks with each ex and fatigues easily after a few reps. Will monitor his response to today's tx and adjust his program accordingly.     Mikey Is progressing well towards his goals.   Pt prognosis is Good.     Pt will continue to benefit from skilled outpatient physical therapy to address the deficits listed in the problem list box on initial evaluation, provide pt/family education and to maximize pt's level of independence in the home and community environment.     Pt's spiritual, cultural and educational needs considered and pt agreeable to plan of care and goals.     Anticipated barriers to physical therapy: chronicity, uncontrolled diabetes, Diabetic amyotrophy    Goals:   Short Term Goals: 3-4 weeks   Increase hip flexion  strength by 1/2 grade, progressing, not met  Increase hip Abd strength by 1/2 grade, not met  Increase left ankle EV/IV by 1/2 grade, progressing, not met  Increase sit to  30 sec by 1-2 or more reps, progressing, not met  Decrease TUG by 2 secs or more, met     Long Term Goals: 8 weeks   Able to ambulate on tuff field w/o assisted device x 40 feet safely, progressing, not met  Increase sit to  30 sec to 10 or more, progressing, not met  Able to stand narrow stance w/o sway, progressing, not met  Able to ambulate with walking stick x 400 feet w/o break, progressing, not met  Increase LE strength to 4+/5 grossly, progressing, not met    PLAN     Plan of care Certification: 5/11/2023-7/11/2023     Outpatient Physical Therapy 2 times weekly for 8 weeks to  include the following interventions: Gait Training, Manual Therapy, Neuromuscular Re-ed, Patient Education, Self Care, Therapeutic Activities and Therapeutic Exercise    Steve Coy PTA

## 2023-05-26 ENCOUNTER — PATIENT MESSAGE (OUTPATIENT)
Dept: ADMINISTRATIVE | Facility: HOSPITAL | Age: 68
End: 2023-05-26
Payer: MEDICARE

## 2023-05-26 ENCOUNTER — PATIENT OUTREACH (OUTPATIENT)
Dept: ADMINISTRATIVE | Facility: HOSPITAL | Age: 68
End: 2023-05-26
Payer: MEDICARE

## 2023-05-26 NOTE — PROGRESS NOTES
Population Health Chart Review & Patient Outreach Details:     Reason for Outreach Encounter:     [x]  Non-Compliant Report   []  Payor Report ([employer], PHN, BCBS, MSSP, MCIP, UHC, etc.)   []  Pre-Visit Chart Review     Updates Requested / Reviewed:     []  Care Everywhere    []     []  External Sources ([facility], [facility], DIS, etc.)   []  Care Team Updated    Patient Outreach Method:    [x]  Telephone Outreach Completed   [] Successful   [x] Left Voicemail   [] Unable to Contact (wrong number, no voicemail)  [x]  [facility] Outreach Sent  []  Letter Outreach Mailed  []  Fax Sent for External Records  []  External Records Upload    Health Maintenance Topics Addressed and Outreach Outcomes / Actions Taken:        []      Breast Cancer Screening []  Mammo Scheduled      []  External Records Requested     []  Added Reminder to Complete to Upcoming Primary Care Appt Notes     []  Patient Declined     []  Patient Will Call Back to Schedule     []  Patient Will Schedule with External Provider / Order Routed if Applicable             []       Cervical Cancer Screening []  Pap Scheduled      []  External Records Requested     []  Added Reminder to Complete to Upcoming Primary Care Appt Notes     []  Patient Declined     []  Patient Will Call Back to Schedule     []  Patient Will Schedule with External Provider               []          Colorectal Cancer Screening []  Colonoscopy Case Request or Referral Placed     []  External Records Requested     []  Added Reminder to Complete to Upcoming Primary Care Appt Notes     []  Patient Declined     []  Patient Will Call Back to Schedule     []  Patient Will Schedule with External Provider     []  Fit Kit Mailed (add the SmartPhrase under additional notes)     []  Reminded Patient to Complete Home Test             [x]      Diabetic Eye Exam []  Eye Camera Scheduled or Optometry Referral Placed     []  External Records Requested     []  Added Reminder to Complete  to Upcoming Primary Care Appt Notes     []  Patient Declined     []  Patient Will Call Back to Schedule     []  Patient Will Schedule with External Provider             []      Blood Pressure Control []  Primary Care Follow Up Visit Scheduled     []  Remote Blood Pressure Reading Captured     []  Added Reminder to Complete to Upcoming Primary Care Appt Notes     []  Patient Declined     []  Patient Will Call Back / Patient Will Send Portal Message with Reading     []  Patient Will Call Back to Schedule Provider Visit             []       HbA1c & Other Labs []  Lab Appt Scheduled for Due Labs     []  Primary Care Follow Up Visit Scheduled      []  Reminded Patient to Complete Home Test     []  Added Reminder to Complete to Upcoming Primary Care Appt Notes     []  Patient Declined     []  Patient Will Call Back to Schedule     []  Patient Will Schedule with External Provider / Order Routed if Applicable           []    Schedule Primary Care Appt []  Primary Care Appt Scheduled     []  Patient Declined     []  Patient Will Call Back to Schedule     []  Pt Established with External Provider & Updated Care Team             []      Medication Adherence []  Primary Care Appointment Scheduled     []  Added Reminder to Upcoming Primary Care Appt Notes     []  Patient Reminded to  Prescription     []  Patient Declined, Provider Notified if Needed     []  Sent Provider Message to Review and/or Add Exclusion to Problem List             []      Osteoporosis Screening []  DXA Appointment Scheduled     []  External Records Requested     []  Added Reminder to Complete to Upcoming Primary Care Appt Notes     []  Patient Declined     []  Patient Will Call Back to Schedule     []  Patient Will Schedule with External Provider / Order Routed if Applicable     Additional Care Coordinator Notes:         Further Action Needed If Patient Returns Outreach:

## 2023-06-01 ENCOUNTER — CLINICAL SUPPORT (OUTPATIENT)
Dept: REHABILITATION | Facility: HOSPITAL | Age: 68
End: 2023-06-01
Attending: PSYCHIATRY & NEUROLOGY
Payer: MEDICARE

## 2023-06-01 DIAGNOSIS — R26.9 GAIT DIFFICULTY: ICD-10-CM

## 2023-06-01 DIAGNOSIS — R29.898 LEG WEAKNESS, BILATERAL: Primary | ICD-10-CM

## 2023-06-01 PROCEDURE — 97110 THERAPEUTIC EXERCISES: CPT | Mod: PO

## 2023-06-01 PROCEDURE — 97112 NEUROMUSCULAR REEDUCATION: CPT | Mod: PO

## 2023-06-01 NOTE — PROGRESS NOTES
[facility]   Physical Therapy Treatment Note     Name: Mikey [facility] Number: [id number]    Therapy Diagnosis:   Encounter Diagnoses   Name Primary?    Leg weakness, bilateral Yes    Gait difficulty      Physician: Eloisa Frank,*    Visit Date: 6/1/2023  Physician Orders: PT Eval and Treat   Medical Diagnosis from Referral: E11.44 (ICD-10-CM) - Diabetic amyotrophy associated with type 2 diabetes mellitus      Evaluation Date: 11/9/2022  Authorization Period Expiration: 12/31/22  Plan of Care Expiration: 07/11/2023  Visit # / Visits authorized: 16/20     Time In: 1600  Time Out: 1645  Total Billable Time: 45 minutes     Precautions: Standard, Diabetes and Fall        SUBJECTIVE     Pt reports: his main goal is to be able to walk without the cane.  He was not compliant with home exercise program.  Response to previous treatment: no adverse effects   Functional change: none to date    Pain: 0/10  Location:       OBJECTIVE     Objective Measures updated at progress report unless specified.     Treatment     Mikey received the treatments listed below:      therapeutic exercises to develop strength, endurance, ROM, flexibility, posture, and core stabilization for 25 minutes including:  Nu-step 10 min L2  GSS on incline 2 min  Heel Raise 20x  Standing hip ext 20x    HELD  LAQ 3# 20x  SLR 20x  Bridging 20x  SL hip ABD 20x  Supine hip ADD with ball 20x  Mini Squat 20x    neuromuscular re-education activities to improve: Balance, Coordination, Kinesthetic, Sense, Proprioception, and Posture for 20 minutes. The following activities were included:  Standing balance 4x1 min ( rest breaks between each set)  -hip width   -feet together   -modified tandem ea  -tandem each       Patient Education and Home Exercises     Home Exercises Provided and Patient Education Provided     Education provided:   - importance of activity  -maintenance of glucose levels    Written Home Exercises Provided:  Patient instructed to cont prior HEP. Exercises were reviewed and Mikey was able to demonstrate them prior to the end of the session.  Mikey demonstrated good  understanding of the education provided. See EMR under Patient Instructions for exercises provided during therapy sessions    ASSESSMENT     Patient tolerated session well. He demonstrates difficulty maintaining balance without intermittent use of UE. Patient's main goal is to increase cardiorespiratory fitness and walk without cane. Will work towards achieving these goals in subsequent visits. It should be noted that patient has been experiencing LLE>RLE edema, diminished active left dorsiflexion ROM, and loss of fine motor skills in bilateral hands.    Mikey Is progressing well towards his goals.   Pt prognosis is Good.     Pt will continue to benefit from skilled outpatient physical therapy to address the deficits listed in the problem list box on initial evaluation, provide pt/family education and to maximize pt's level of independence in the home and community environment.     Pt's spiritual, cultural and educational needs considered and pt agreeable to plan of care and goals.     Anticipated barriers to physical therapy: chronicity, uncontrolled diabetes, Diabetic amyotrophy    Goals:   Short Term Goals: 3-4 weeks   Increase hip flexion  strength by 1/2 grade, progressing, not met  Increase hip Abd strength by 1/2 grade, not met  Increase left ankle EV/IV by 1/2 grade, progressing, not met  Increase sit to  30 sec by 1-2 or more reps, progressing, not met  Decrease TUG by 2 secs or more, met     Long Term Goals: 8 weeks   Able to ambulate on tuff field w/o assisted device x 40 feet safely, progressing, not met  Increase sit to  30 sec to 10 or more, progressing, not met  Able to stand narrow stance w/o sway, progressing, not met  Able to ambulate with walking stick x 400 feet w/o break, progressing, not met  Increase LE strength to 4+/5  grossly, progressing, not met    PLAN     Plan of care Certification: 5/11/2023-7/11/2023     Outpatient Physical Therapy 2 times weekly for 8 weeks to include the following interventions: Gait Training, Manual Therapy, Neuromuscular Re-ed, Patient Education, Self Care, Therapeutic Activities and Therapeutic Exercise    Kevin Marques, PT

## 2023-06-06 ENCOUNTER — CLINICAL SUPPORT (OUTPATIENT)
Dept: REHABILITATION | Facility: HOSPITAL | Age: 68
End: 2023-06-06
Attending: PSYCHIATRY & NEUROLOGY
Payer: MEDICARE

## 2023-06-06 DIAGNOSIS — R29.898 LEG WEAKNESS, BILATERAL: Primary | ICD-10-CM

## 2023-06-06 DIAGNOSIS — R26.9 GAIT DIFFICULTY: ICD-10-CM

## 2023-06-06 PROCEDURE — 97112 NEUROMUSCULAR REEDUCATION: CPT | Mod: PO,CQ

## 2023-06-06 PROCEDURE — 97110 THERAPEUTIC EXERCISES: CPT | Mod: PO,CQ

## 2023-06-06 NOTE — PROGRESS NOTES
[facility] OUTPATIENT THERAPY AND WELLNESS   Physical Therapy Treatment Note     Name: Mikey Madison  Clinic Number: [id number]    Therapy Diagnosis:   Encounter Diagnoses   Name Primary?    Leg weakness, bilateral Yes    Gait difficulty      Physician: Eloisa Frank,*    Visit Date: 6/6/2023  Physician Orders: PT Eval and Treat   Medical Diagnosis from Referral: E11.44 (ICD-10-CM) - Diabetic amyotrophy associated with type 2 diabetes mellitus      Evaluation Date: 11/9/2022  Authorization Period Expiration: 12/31/22  Plan of Care Expiration: 07/11/2023  Visit # / Visits authorized: 17/20     Time In: 4:38  Time Out: 5:30  Total Billable Time: 52 minutes     Precautions: Standard, Diabetes and Fall        SUBJECTIVE     Pt reports: he is w/o complaint today.  He was not compliant with home exercise program.  Response to previous treatment: no adverse effects   Functional change: none to date    Pain: 0/10  Location:       OBJECTIVE     Objective Measures updated at progress report unless specified.     Treatment     Mikey received the treatments listed below:      therapeutic exercises to develop strength, endurance, ROM, flexibility, posture, and core stabilization for 35 minutes including:  Nu-step 10 min L2  GSS on incline 2 min  Heel Raise 20x  Standing hip ext 20x 2#  Standing hip ABD 20x 2#  Mini Squat 20x  Heel Raise 20x  LAQ 4# 20x    Not performed  SLR 20x  Bridging 20x  SL hip ABD 20x  Supine hip ADD with ball 20x      neuromuscular re-education activities to improve: Balance, Coordination, Kinesthetic, Sense, Proprioception, and Posture for 10 minutes. The following activities were included:  Marching at stairs 2# 20x each  Standing balance 4x1 min   -hip width   -feet together 3x 30 sec  -tandem each 3x 30 sec      Patient Education and Home Exercises     Home Exercises Provided and Patient Education Provided     Education provided:   - importance of activity  -maintenance of glucose levels    Written Home  Exercises Provided: Patient instructed to cont prior HEP. Exercises were reviewed and Mikey was able to demonstrate them prior to the end of the session.  Mikey demonstrated good  understanding of the education provided. See EMR under Patient Instructions for exercises provided during therapy sessions    ASSESSMENT     Mikey adryan today's tx with progression of ther ex and neuromuscular re-ed well.He demonstrates  some difficulty maintaining balance without intermittent use of UE but shows improvement today.He continues to require occasional rest breaks between exs. Patient's main goal is to increase cardiorespiratory fitness and walk without cane. Will work towards achieving these goals in subsequent visits. It should be noted that patient has been experiencing LLE>RLE edema, diminished active left dorsiflexion ROM, and loss of fine motor skills in bilateral hands.    Mikey Is progressing well towards his goals.   Pt prognosis is Good.     Pt will continue to benefit from skilled outpatient physical therapy to address the deficits listed in the problem list box on initial evaluation, provide pt/family education and to maximize pt's level of independence in the home and community environment.     Pt's spiritual, cultural and educational needs considered and pt agreeable to plan of care and goals.     Anticipated barriers to physical therapy: chronicity, uncontrolled diabetes, Diabetic amyotrophy    Goals:   Short Term Goals: 3-4 weeks   Increase hip flexion  strength by 1/2 grade, progressing, not met  Increase hip Abd strength by 1/2 grade, not met  Increase left ankle EV/IV by 1/2 grade, progressing, not met  Increase sit to  30 sec by 1-2 or more reps, progressing, not met  Decrease TUG by 2 secs or more, met     Long Term Goals: 8 weeks   Able to ambulate on tuff field w/o assisted device x 40 feet safely, progressing, not met  Increase sit to  30 sec to 10 or more, progressing, not met  Able to stand  narrow stance w/o sway, progressing, not met  Able to ambulate with walking stick x 400 feet w/o break, progressing, not met  Increase LE strength to 4+/5 grossly, progressing, not met    PLAN     Plan of care Certification: 5/11/2023-7/11/2023     Outpatient Physical Therapy 2 times weekly for 8 weeks to include the following interventions: Gait Training, Manual Therapy, Neuromuscular Re-ed, Patient Education, Self Care, Therapeutic Activities and Therapeutic Exercise    Steve Coy, PTA

## 2023-06-09 ENCOUNTER — CLINICAL SUPPORT (OUTPATIENT)
Dept: REHABILITATION | Facility: HOSPITAL | Age: 68
End: 2023-06-09
Attending: PSYCHIATRY & NEUROLOGY
Payer: MEDICARE

## 2023-06-09 DIAGNOSIS — R29.898 LEG WEAKNESS, BILATERAL: Primary | ICD-10-CM

## 2023-06-09 PROCEDURE — 97110 THERAPEUTIC EXERCISES: CPT | Mod: PO,CQ

## 2023-06-09 PROCEDURE — 97530 THERAPEUTIC ACTIVITIES: CPT | Mod: PO,CQ

## 2023-06-09 NOTE — PROGRESS NOTES
[facility]   Physical Therapy Treatment Note     Name: Mikey [place]  Clinic Number: [id number]    Therapy Diagnosis:   No diagnosis found.    Physician: Eloisa Frank,*    Visit Date: 6/9/2023  Physician Orders: PT Eval and Treat   Medical Diagnosis from Referral: E11.44 (ICD-10-CM) - Diabetic amyotrophy associated with type 2 diabetes mellitus      Evaluation Date: 11/9/2022  Authorization Period Expiration: 12/31/22  Plan of Care Expiration: 07/11/2023  Visit # / Visits authorized: 18/20     Time In: 4:45  Time Out: 5:30  Total Billable Time: 52 minutes     Precautions: Standard, Diabetes and Fall        SUBJECTIVE     Pt reports: that he had some gen mm soreness the day post last tx..  He was not compliant with home exercise program.  Response to previous treatment: no adverse effects   Functional change: none to date    Pain: 0/10  Location:       OBJECTIVE     Objective Measures updated at progress report unless specified.     Treatment     Mikey received the treatments listed below:      therapeutic exercises to develop strength, endurance, ROM, flexibility, posture, and core stabilization for 35 minutes including:  Nu-step 10 min L2    Not today  GSS on incline 2 min  Heel Raise 20x  Standing hip ext 20x 2#  Standing hip ABD 20x 2#  Mini Squat 20x  Heel Raise 20x  LAQ 4# 20x    SLR 20x 2# on R  Bridging 20x  SL hip ABD 20x 2# on R  Supine hip ADD with ball with PPT 3 sec hold x 3 min    Therapeutic activity 10 min  Treadmill x 10 min for gt, endurance, VCs for step length and R  foot DF.     neuromuscular re-education activities to improve: Balance, Coordination, Kinesthetic, Sense, Proprioception, and Posture for 00 minutes. The following activities were included:  Marching at stairs 2# 20x each  Standing balance 4x1 min   -hip width   -feet together 3x 30 sec  -tandem each 3x 30 sec      Patient Education and Home Exercises     Home Exercises Provided and Patient Education  Provided     Education provided:   - importance of activity  -maintenance of glucose levels    Written Home Exercises Provided: Patient instructed to cont prior HEP. Exercises were reviewed and Mikey was able to demonstrate them prior to the end of the session.  Mikey demonstrated good  understanding of the education provided. See EMR under Patient Instructions for exercises provided during therapy sessions    ASSESSMENT     Mikey adryan today's tx with progression of ther ex and ther act well.He demonstrates  weakness in L LE vs R with SLR and hip ABD but does demonstrate better DF on L vs R. He was able to increase TM speed to 1.6 mph without toe drag.He continues to require occasional rest breaks between exs. Patient's main goal is to increase cardiorespiratory fitness and walk without cane. Will work towards achieving these goals in subsequent visits. It should be noted that patient has been experiencing LLE>RLE edema, diminished active left dorsiflexion ROM, and loss of fine motor skills in bilateral hands.    Mikey Is progressing well towards his goals.   Pt prognosis is Good.     Pt will continue to benefit from skilled outpatient physical therapy to address the deficits listed in the problem list box on initial evaluation, provide pt/family education and to maximize pt's level of independence in the home and community environment.     Pt's spiritual, cultural and educational needs considered and pt agreeable to plan of care and goals.     Anticipated barriers to physical therapy: chronicity, uncontrolled diabetes, Diabetic amyotrophy    Goals:   Short Term Goals: 3-4 weeks   Increase hip flexion  strength by 1/2 grade, progressing, not met  Increase hip Abd strength by 1/2 grade, not met  Increase left ankle EV/IV by 1/2 grade, progressing, not met  Increase sit to  30 sec by 1-2 or more reps, progressing, not met  Decrease TUG by 2 secs or more, met     Long Term Goals: 8 weeks   Able to ambulate on tuff field  w/o assisted device x 40 feet safely, progressing, not met  Increase sit to  30 sec to 10 or more, progressing, not met  Able to stand narrow stance w/o sway, progressing, not met  Able to ambulate with walking stick x 400 feet w/o break, progressing, not met  Increase LE strength to 4+/5 grossly, progressing, not met    PLAN     Plan of care Certification: 5/11/2023-7/11/2023     Outpatient Physical Therapy 2 times weekly for 8 weeks to include the following interventions: Gait Training, Manual Therapy, Neuromuscular Re-ed, Patient Education, Self Care, Therapeutic Activities and Therapeutic Exercise    Steve Coy, PTA

## 2023-06-22 ENCOUNTER — CLINICAL SUPPORT (OUTPATIENT)
Dept: REHABILITATION | Facility: HOSPITAL | Age: 68
End: 2023-06-22
Attending: PSYCHIATRY & NEUROLOGY
Payer: MEDICARE

## 2023-06-22 DIAGNOSIS — R29.898 LEG WEAKNESS, BILATERAL: Primary | ICD-10-CM

## 2023-06-22 DIAGNOSIS — R26.9 GAIT DIFFICULTY: ICD-10-CM

## 2023-06-22 PROCEDURE — 97530 THERAPEUTIC ACTIVITIES: CPT | Mod: PO,CQ

## 2023-06-22 PROCEDURE — 97110 THERAPEUTIC EXERCISES: CPT | Mod: PO,CQ

## 2023-06-22 NOTE — PROGRESS NOTES
[facility]   Physical Therapy Treatment Note     Name: Mikey [place]  Clinic Number: [id number]    Therapy Diagnosis:   Encounter Diagnoses   Name Primary?    Leg weakness, bilateral Yes    Gait difficulty        Physician: Eloisa Frank,*    Visit Date: 6/22/2023  Physician Orders: PT Eval and Treat   Medical Diagnosis from Referral: E11.44 (ICD-10-CM) - Diabetic amyotrophy associated with type 2 diabetes mellitus      Evaluation Date: 11/9/2022  Authorization Period Expiration: 12/31/22  Plan of Care Expiration: 07/11/2023  Visit # / Visits authorized: 19/20     Time In: 5:05  Time Out: 6:00  Total Billable Time: 55 minutes     Precautions: Standard, Diabetes and Fall        SUBJECTIVE     Pt reports: that he had some gen mm soreness the day post last tx..  He was not compliant with home exercise program.  Response to previous treatment: no adverse effects   Functional change: none to date    Pain: 0/10  Location:       OBJECTIVE     Objective Measures updated at progress report unless specified.     Treatment     Mikey received 58e treatments listed below:      therapeutic exercises to develop strength, endurance, ROM, flexibility, posture, and core stabilization for 45 minutes including:  Nu-step 10 min L2      GSS on incline 2 min  Heel Raise 20x  Standing hip ext 20x 3#  Standing hip ABD 20x 3#  Mini Squat 20x  LAQ 5# 20x  Step tap at stairs 20x B  Lat step down 4 inch 20x B      Not performed   SLR 20x 2# on R  Bridging 20x  SL hip ABD 20x 2# on R  Supine hip ADD with ball with PPT 3 sec hold x 3 min    Therapeutic activity 10 min  Treadmill x 10 min for gt, endurance, VCs for step length and R  foot DF from 1.2 mph up to 1.7 mph.     neuromuscular re-education activities to improve: Balance, Coordination, Kinesthetic, Sense, Proprioception, and Posture for 00 minutes. The following activities were included:  Marching at stairs 2# 20x each  Standing balance 4x1 min   -hip width    -feet together 3x 30 sec  -tandem each 3x 30 sec      Patient Education and Home Exercises     Home Exercises Provided and Patient Education Provided     Education provided:   - importance of activity  -maintenance of glucose levels    Written Home Exercises Provided: Patient instructed to cont prior HEP. Exercises were reviewed and Mikey was able to demonstrate them prior to the end of the session.  Mikey demonstrated good  understanding of the education provided. See EMR under Patient Instructions for exercises provided during therapy sessions    ASSESSMENT     Mikey adryan today's tx with progression of ther ex and ther act well.He was able to progress with resistance with LE strengthening exs, add step downs and increase pace on treadmill w/o difficulty today. He  demonstrates  weakness in L LE vs R with step downs  and hip ABD but does demonstrate better DF on L vs R. He was able to increase TM speed to 1.7 mph without toe drag.He continues to require occasional rest breaks between exs. Patient's main goal is to increase cardiorespiratory fitness and walk without cane. Will work towards achieving these goals in subsequent visits. It should be noted that patient has been experiencing LLE>RLE edema, diminished active left dorsiflexion ROM, and loss of fine motor skills in bilateral hands.    Mikey Is progressing well towards his goals.   Pt prognosis is Good.     Pt will continue to benefit from skilled outpatient physical therapy to address the deficits listed in the problem list box on initial evaluation, provide pt/family education and to maximize pt's level of independence in the home and community environment.     Pt's spiritual, cultural and educational needs considered and pt agreeable to plan of care and goals.     Anticipated barriers to physical therapy: chronicity, uncontrolled diabetes, Diabetic amyotrophy    Goals:   Short Term Goals: 3-4 weeks   Increase hip flexion  strength by 1/2 grade, progressing, not  met  Increase hip Abd strength by 1/2 grade, not met  Increase left ankle EV/IV by 1/2 grade, progressing, not met  Increase sit to  30 sec by 1-2 or more reps, progressing, not met  Decrease TUG by 2 secs or more, met     Long Term Goals: 8 weeks   Able to ambulate on tuff field w/o assisted device x 40 feet safely, progressing, not met  Increase sit to  30 sec to 10 or more, progressing, not met  Able to stand narrow stance w/o sway, progressing, not met  Able to ambulate with walking stick x 400 feet w/o break, progressing, not met  Increase LE strength to 4+/5 grossly, progressing, not met    PLAN     Plan of care Certification: 5/11/2023-7/11/2023     Outpatient Physical Therapy 2 times weekly for 8 weeks to include the following interventions: Gait Training, Manual Therapy, Neuromuscular Re-ed, Patient Education, Self Care, Therapeutic Activities and Therapeutic Exercise    Steve Coy, PTA

## 2023-06-26 ENCOUNTER — CLINICAL SUPPORT (OUTPATIENT)
Dept: REHABILITATION | Facility: HOSPITAL | Age: 68
End: 2023-06-26
Attending: PSYCHIATRY & NEUROLOGY
Payer: MEDICARE

## 2023-06-26 DIAGNOSIS — R26.9 GAIT DIFFICULTY: ICD-10-CM

## 2023-06-26 DIAGNOSIS — R29.898 LEG WEAKNESS, BILATERAL: Primary | ICD-10-CM

## 2023-06-26 PROCEDURE — 97110 THERAPEUTIC EXERCISES: CPT | Mod: PO

## 2023-06-26 NOTE — PROGRESS NOTES
[facility]   Physical Therapy Treatment Note     Name: Mikey [facility] Number: [id number]    Therapy Diagnosis:   Encounter Diagnoses   Name Primary?    Leg weakness, bilateral Yes    Gait difficulty        Physician: Eloisa Frank,*    Visit Date: 6/26/2023  Physician Orders: PT Eval and Treat   Medical Diagnosis from Referral: E11.44 (ICD-10-CM) - Diabetic amyotrophy associated with type 2 diabetes mellitus      Evaluation Date: 11/9/2022  Authorization Period Expiration: 12/31/22  Plan of Care Expiration: 07/11/2023  Visit # / Visits authorized: 19/20     Time In: 1400  Time Out: 1445  Total Billable Time: 45 minutes     Precautions: Standard, Diabetes and Fall        SUBJECTIVE     Pt reports: that he hasn't had his blood glucose under control so that is the reason for his lack of progression.   He was not compliant with home exercise program.  Response to previous treatment: no adverse effects   Functional change: none to date    Pain: 0/10  Location:       OBJECTIVE     Objective Measures updated at progress report unless specified.     Treatment     Mikey received the treatments listed below:      therapeutic exercises to develop strength, endurance, ROM, flexibility, posture, and core stabilization for 45 minutes including:  Nu-step 10 min L2      GSS on incline 2 min  Heel Raise 20x  Standing hip ext 20x 3#  Standing hip ABD 20x 3#  Mini Squat 20x  LAQ 5# 20x  Step tap at stairs 20x B  Lat step down 4 inch 20x B      Not performed   SLR 20x 2# on R  Bridging 20x  SL hip ABD 20x 2# on R  Supine hip ADD with ball with PPT 3 sec hold x 3 min    Therapeutic activity 00 min  Treadmill x 10 min for gt, endurance, VCs for step length and R  foot DF from 1.2 mph up to 1.7 mph.     neuromuscular re-education activities to improve: Balance, Coordination, Kinesthetic, Sense, Proprioception, and Posture for 00 minutes. The following activities were included:  Marching at stairs 2#  20x each  Standing balance 4x1 min   -hip width   -feet together 3x 30 sec  -tandem each 3x 30 sec      Patient Education and Home Exercises     Home Exercises Provided and Patient Education Provided     Education provided:   - importance of activity  -maintenance of glucose levels    Written Home Exercises Provided: Patient instructed to cont prior HEP. Exercises were reviewed and Mikey was able to demonstrate them prior to the end of the session.  Mikey demonstrated good  understanding of the education provided. See EMR under Patient Instructions for exercises provided during therapy sessions    ASSESSMENT     Patient continues to struggle with functional mobility, HEP compliance, and LE strengthening. Thoroughly educated patient on the importance of HEP compliance, discussed lack of improvement demonstrates despite >20 visits, and Plan of Care extension of 4 weeks on. Requesting an additional four weeks to address remaining deficits and increase independence.  It should be noted that patient has been experiencing LLE>RLE edema, diminished active left dorsiflexion ROM, and loss of fine motor skills in bilateral hands.    Mikey Is progressing well towards his goals.   Pt prognosis is Good.     Pt will continue to benefit from skilled outpatient physical therapy to address the deficits listed in the problem list box on initial evaluation, provide pt/family education and to maximize pt's level of independence in the home and community environment.     Pt's spiritual, cultural and educational needs considered and pt agreeable to plan of care and goals.     Anticipated barriers to physical therapy: chronicity, uncontrolled diabetes, Diabetic amyotrophy    Goals:   Short Term Goals: 3-4 weeks   Increase hip flexion  strength by 1/2 grade, progressing, not met  Increase hip Abd strength by 1/2 grade, not met  Increase left ankle EV/IV by 1/2 grade, progressing, not met  Increase sit to  30 sec by 1-2 or more reps,  progressing, not met  Decrease TUG by 2 secs or more, met     Long Term Goals: 8 weeks   Able to ambulate on tuff field w/o assisted device x 40 feet safely, progressing, not met  Increase sit to  30 sec to 10 or more, progressing, not met  Able to stand narrow stance w/o sway, progressing, not met  Able to ambulate with walking stick x 400 feet w/o break, progressing, not met  Increase LE strength to 4+/5 grossly, progressing, not met    PLAN     Plan of care Certification: 5/11/2023-7/11/2023     Outpatient Physical Therapy 2 times weekly for 8 weeks to include the following interventions: Gait Training, Manual Therapy, Neuromuscular Re-ed, Patient Education, Self Care, Therapeutic Activities and Therapeutic Exercise    Kevin Marques, PT

## 2023-06-27 ENCOUNTER — CLINICAL SUPPORT (OUTPATIENT)
Dept: REHABILITATION | Facility: HOSPITAL | Age: 68
End: 2023-06-27
Attending: PSYCHIATRY & NEUROLOGY
Payer: MEDICARE

## 2023-06-27 DIAGNOSIS — R26.9 GAIT DIFFICULTY: ICD-10-CM

## 2023-06-27 DIAGNOSIS — R29.898 LEG WEAKNESS, BILATERAL: Primary | ICD-10-CM

## 2023-06-27 PROCEDURE — 97110 THERAPEUTIC EXERCISES: CPT | Mod: PO,CQ

## 2023-06-27 PROCEDURE — 97530 THERAPEUTIC ACTIVITIES: CPT | Mod: PO,CQ

## 2023-06-27 NOTE — PROGRESS NOTES
[facility]   Physical Therapy Treatment Note     Name: Mikey [place]  Clinic Number: [id number]    Therapy Diagnosis:   Encounter Diagnoses   Name Primary?    Leg weakness, bilateral Yes    Gait difficulty        Physician: Eloisa Frank,*    Visit Date: 6/27/2023  Physician Orders: PT Eval and Treat   Medical Diagnosis from Referral: E11.44 (ICD-10-CM) - Diabetic amyotrophy associated with type 2 diabetes mellitus      Evaluation Date: 11/9/2022  Authorization Period Expiration: 12/31/22  Plan of Care Expiration: 07/11/2023  Visit # / Visits authorized: 20/20     Time In: 4:45 PM  Time Out: 5:30  Total Billable Time: 45 minutes     Precautions: Standard, Diabetes and Fall        SUBJECTIVE     Pt reports: that his legs are not very strong today due to having attended therapy yesterday.   He was not compliant with home exercise program.  Response to previous treatment: no adverse effects   Functional change: none to date    Pain: 0/10  Location:       OBJECTIVE     Objective Measures updated at progress report unless specified.     Treatment     Mikey received the treatments listed below:      therapeutic exercises to develop strength, endurance, ROM, flexibility, posture, and core stabilization for 35 minutes including:  Nu-step 10 min L2  GSS on incline 2 min  Heel Raise 20x NP  Standing hip ext 20x 3#  Standing hip ABD 20x 3#  Mini Squat 20x NP  LAQ 5# 20xNP  Step tap at stairs 20x B    NP        Lat step down 4 inch 20x B NP      Not performed   SLR 20x 2# on R  Bridging 20x  SL hip ABD 20x 2# on R  Supine hip ADD with ball with PPT 3 sec hold x 3 min    Therapeutic activity 10 min  Treadmill x 10 min for gt, endurance, VCs for step length and R  foot DF from .8 mph up to 1.8 mph.     neuromuscular re-education activities to improve: Balance, Coordination, Kinesthetic, Sense, Proprioception, and Posture for 00 minutes. The following activities were included:  Marching at stairs 2#  20x each  Standing balance 4x1 min   -hip width   -feet together 3x 30 sec  -tandem each 3x 30 sec      Patient Education and Home Exercises     Home Exercises Provided and Patient Education Provided     Education provided:   - importance of activity  -maintenance of glucose levels    Written Home Exercises Provided: Patient instructed to cont prior HEP. Exercises were reviewed and Mikey was able to demonstrate them prior to the end of the session.  Mikey demonstrated good  understanding of the education provided. See EMR under Patient Instructions for exercises provided during therapy sessions    ASSESSMENT     Mikey adryan today's tx with ther ex and ther act well with only some minor fatigue noted. His program was less strenuous today due to having attended therapy yesterday.  Patient continues to struggle with functional mobility, HEP compliance, and LE strengthening.     Mikey Is progressing well towards his goals.   Pt prognosis is Good.     Pt will continue to benefit from skilled outpatient physical therapy to address the deficits listed in the problem list box on initial evaluation, provide pt/family education and to maximize pt's level of independence in the home and community environment.     Pt's spiritual, cultural and educational needs considered and pt agreeable to plan of care and goals.     Anticipated barriers to physical therapy: chronicity, uncontrolled diabetes, Diabetic amyotrophy    Goals:   Short Term Goals: 3-4 weeks   Increase hip flexion  strength by 1/2 grade, progressing, not met  Increase hip Abd strength by 1/2 grade, not met  Increase left ankle EV/IV by 1/2 grade, progressing, not met  Increase sit to  30 sec by 1-2 or more reps, progressing, not met  Decrease TUG by 2 secs or more, met     Long Term Goals: 8 weeks   Able to ambulate on [facility] w/o assisted device x 40 feet safely, progressing, not met  Increase sit to  30 sec to 10 or more, progressing, not met  Able to  stand narrow stance w/o sway, progressing, not met  Able to ambulate with walking stick x 400 feet w/o break, progressing, not met  Increase LE strength to 4+/5 grossly, progressing, not met    PLAN     Plan of care Certification: 5/11/2023-7/11/2023     Outpatient Physical Therapy 2 times weekly for 8 weeks to include the following interventions: Gait Training, Manual Therapy, Neuromuscular Re-ed, Patient Education, Self Care, Therapeutic Activities and Therapeutic Exercise    Steve Coy, PTA

## 2023-06-28 ENCOUNTER — PATIENT MESSAGE (OUTPATIENT)
Dept: ADMINISTRATIVE | Facility: HOSPITAL | Age: 68
End: 2023-06-28
Payer: MEDICARE

## 2023-07-07 ENCOUNTER — CLINICAL SUPPORT (OUTPATIENT)
Dept: REHABILITATION | Facility: HOSPITAL | Age: 68
End: 2023-07-07
Attending: PSYCHIATRY & NEUROLOGY
Payer: MEDICARE

## 2023-07-07 DIAGNOSIS — R29.898 LEG WEAKNESS, BILATERAL: Primary | ICD-10-CM

## 2023-07-07 DIAGNOSIS — R26.9 GAIT DIFFICULTY: ICD-10-CM

## 2023-07-07 PROCEDURE — 97110 THERAPEUTIC EXERCISES: CPT | Mod: PO,CQ

## 2023-07-07 PROCEDURE — 97530 THERAPEUTIC ACTIVITIES: CPT | Mod: PO,CQ

## 2023-07-07 NOTE — PROGRESS NOTES
[facility]   Physical Therapy Treatment Note     Name: Mikey [place]  Clinic Number: [id number]    Therapy Diagnosis:   Encounter Diagnoses   Name Primary?    Leg weakness, bilateral Yes    Gait difficulty        Physician: Eloisa Frank,*    Visit Date: 7/7/2023  Physician Orders: PT Eval and Treat   Medical Diagnosis from Referral: E11.44 (ICD-10-CM) - Diabetic amyotrophy associated with type 2 diabetes mellitus      Evaluation Date: 11/9/2022  Authorization Period Expiration: 12/31/22  Plan of Care Expiration: 07/11/2023  Visit # / Visits authorized: 21/20     Time In: 4:30 PM  Time Out: 5:26  Total Billable Time:65 minutes     Precautions: Standard, Diabetes and Fall        SUBJECTIVE     Pt reports: that his legs are not very strong today due to having attended therapy yesterday.   He was not compliant with home exercise program.  Response to previous treatment: no adverse effects   Functional change: none to date    Pain: 0/10  Location:       OBJECTIVE     Objective Measures updated at progress report unless specified.     Treatment     Mikey received the treatments listed below:      therapeutic exercises to develop strength, endurance, ROM, flexibility, posture, and core stabilization for 15 minutes including:  Nu-step 10 min L2  GSS on incline 2 min    Not performed today  Heel Raise 20x NP  Standing hip ext 20x 3#  Standing hip ABD 20x 3#  Mini Squat 20x NP  LAQ 5# 20xNP  Step tap at stairs 20x B    NP        Lat step down 4 inch 20x B NP      Not performed   SLR 20x 2# on R  Bridging 20x  SL hip ABD 20x 2# on R  Supine hip ADD with ball with PPT 3 sec hold x 3 min    Therapeutic activity 41 min  Treadmill x 10 min for gt, endurance, VCs for step length and R  foot DF from .8 mph up to 1.8 mph.   AMB in clinic with walking stick x 631 ft.    neuromuscular re-education activities to improve: Balance, Coordination, Kinesthetic, Sense, Proprioception, and Posture for 00 minutes.  The following activities were included:  Marching at stairs 2# 20x each  Standing balance 4x1 min   -hip width   -feet together 3x 30 sec  -tandem each 3x 30 sec      Patient Education and Home Exercises     Home Exercises Provided and Patient Education Provided     Education provided:   - importance of activity  -maintenance of glucose levels    Written Home Exercises Provided: Patient instructed to cont prior HEP. Exercises were reviewed and Mikey was able to demonstrate them prior to the end of the session.  Mikey demonstrated good  understanding of the education provided. See EMR under Patient Instructions for exercises provided during therapy sessions    ASSESSMENT     Mikey adryan today's tx with ther ex and ther act well with only some minor fatigue noted post amb. He achieved LTG of amb > 400 ft with walking stick today. He was amb to complete 631 ft in 1 trial with no incidence of LOB, foot drop or rest required. He was agreeable to resuming strengthening and bal exs next visit.  Patient continues to struggle with functional mobility, HEP compliance, and LE strengthening.     Mikey Is progressing well towards his goals.   Pt prognosis is Good.     Pt will continue to benefit from skilled outpatient physical therapy to address the deficits listed in the problem list box on initial evaluation, provide pt/family education and to maximize pt's level of independence in the home and community environment.     Pt's spiritual, cultural and educational needs considered and pt agreeable to plan of care and goals.     Anticipated barriers to physical therapy: chronicity, uncontrolled diabetes, Diabetic amyotrophy    Goals:   Short Term Goals: 3-4 weeks   Increase hip flexion  strength by 1/2 grade, progressing, not met  Increase hip Abd strength by 1/2 grade, not met  Increase left ankle EV/IV by 1/2 grade, progressing, not met  Increase sit to  30 sec by 1-2 or more reps, progressing, not met  Decrease TUG by 2 secs or  more, met     Long Term Goals: 8 weeks   Able to ambulate on [place] w/o assisted device x 40 feet safely, progressing, not met  Increase sit to  30 sec to 10 or more, progressing, not met  Able to stand narrow stance w/o sway, progressing, not met  Able to ambulate with walking stick x 400 feet w/o break, met (achieved 631 ft)  Increase LE strength to 4+/5 grossly, progressing, not met    PLAN     Plan of care Certification: 5/11/2023-7/11/2023     Outpatient Physical Therapy 2 times weekly for 8 weeks to include the following interventions: Gait Training, Manual Therapy, Neuromuscular Re-ed, Patient Education, Self Care, Therapeutic Activities and Therapeutic Exercise    Steve Coy, PTA

## 2023-07-11 ENCOUNTER — CLINICAL SUPPORT (OUTPATIENT)
Dept: REHABILITATION | Facility: HOSPITAL | Age: 68
End: 2023-07-11
Attending: PSYCHIATRY & NEUROLOGY
Payer: MEDICARE

## 2023-07-11 DIAGNOSIS — R29.898 LEG WEAKNESS, BILATERAL: Primary | ICD-10-CM

## 2023-07-11 DIAGNOSIS — R26.9 GAIT DIFFICULTY: ICD-10-CM

## 2023-07-11 PROCEDURE — 97110 THERAPEUTIC EXERCISES: CPT | Mod: PO,CQ

## 2023-07-11 PROCEDURE — 97530 THERAPEUTIC ACTIVITIES: CPT | Mod: PO,CQ

## 2023-07-11 NOTE — PROGRESS NOTES
[facility]   Physical Therapy Treatment Note     Name: Mikey [facility] Number: [id number]    Therapy Diaosis:   Encounter Diagnoses   Name Primary?    Leg weakness, bilateral Yes    Gait difficulty        Physician: Eloisa Frank,*    Visit Date: 7/11/2023  Physician Orders: PT Eval and Treat   Medical Diagnosis from Referral: E11.44 (ICD-10-CM) - Diabetic amyotrophy associated with type 2 diabetes mellitus      Evaluation Date: 11/9/2022  Authorization Period Expiration: 12/31/22  Plan of Care Expiration: 07/11/2023  Visit # / Visits authorized: 22/20     Time In: 4:45 PM  Time Out: 5:30  Total Billable Time:45 minutes     Precautions: Standard, Diabetes and Fall        SUBJECTIVE     Pt reports: that he was very tired post last tx.   He was not compliant with home exercise program.  Response to previous treatment: no adverse effects   Functional change: none to date    Pain: 0/10  Location:       OBJECTIVE     Objective Measures updated at progress report unless specified.     Treatment     Mikey received the treatments listed below:      therapeutic exercises to develop strength, endurance, ROM, flexibility, posture, and core stabilization for 35 minutes including  Nu-step 10 min L2  GSS on incline 2 min  Precore Leg Press 20# 20x  Precore knee ext 15# 20x  Precore leg curl 35# 20x  Precore Hip ABD 35#20x  Precore Hip ADD   Standing hip ext on Shuttle 12.5# 20x      Not performed today    Heel Raise 20x NP  Standing hip ext 20x 3#  Standing hip ABD 20x 3#  Mini Squat 20x NP  LAQ 5# 20xNP  Step tap at stairs 20x B    NP        Lat step down 4 inch 20x B NP      Not performed   SLR 20x 2# on R  Bridging 20x  SL hip ABD 20x 2# on R  Supine hip ADD with ball with PPT 3 sec hold x 3 min    Therapeutic activity 10 min  Treadmill x 10 min for gt, endurance, VCs for step length and R  foot DF from .8 mph up to 1.8 mph.   AMB in clinic with walking stick x 631 ft. Not  performed    neuromuscular re-education activities to improve: Balance, Coordination, Kinesthetic, Sense, Proprioception, and Posture for 00 minutes. The following activities were included:  Marching at stairs 2# 20x each  Standing balance 4x1 min   -hip width   -feet together 3x 30 sec  -tandem each 3x 30 sec      Patient Education and Home Exercises     Home Exercises Provided and Patient Education Provided     Education provided:   - importance of activity  -maintenance of glucose levels    Written Home Exercises Provided: Patient instructed to cont prior HEP. Exercises were reviewed and Mikey was able to demonstrate them prior to the end of the session.  Mikey demonstrated good  understanding of the education provided. See EMR under Patient Instructions for exercises provided during therapy sessions    ASSESSMENT     Mikey adryan today's tx with progression of ther ex and ther act well . He returned to strengthening activities today with only some minor fatigue noted post exs. . He was agreeable to resuming strengthening and bal exs next visit.  Patient continues to struggle with functional mobility, HEP compliance, and LE strengthening.     Mikey Is progressing well towards his goals.   Pt prognosis is Good.     Pt will continue to benefit from skilled outpatient physical therapy to address the deficits listed in the problem list box on initial evaluation, provide pt/family education and to maximize pt's level of independence in the home and community environment.     Pt's spiritual, cultural and educational needs considered and pt agreeable to plan of care and goals.     Anticipated barriers to physical therapy: chronicity, uncontrolled diabetes, Diabetic amyotrophy    Goals:   Short Term Goals: 3-4 weeks   Increase hip flexion  strength by 1/2 grade, progressing, not met  Increase hip Abd strength by 1/2 grade, not met  Increase left ankle EV/IV by 1/2 grade, progressing, not met  Increase sit to  30 sec by 1-2  or more reps, progressing, not met  Decrease TUG by 2 secs or more, met     Long Term Goals: 8 weeks   Able to ambulate on [employer] field w/o assisted device x 40 feet safely, progressing, not met  Increase sit to  30 sec to 10 or more, progressing, not met  Able to stand narrow stance w/o sway, progressing, not met  Able to ambulate with walking stick x 400 feet w/o break, met (achieved 631 ft)  Increase LE strength to 4+/5 grossly, progressing, not met    PLAN     Plan of care Certification: 5/11/2023-7/11/2023     Outpatient Physical Therapy 2 times weekly for 8 weeks to include the following interventions: Gait Training, Manual Therapy, Neuromuscular Re-ed, Patient Education, Self Care, Therapeutic Activities and Therapeutic Exercise    Steve Coy, PTA

## 2023-07-14 ENCOUNTER — OFFICE VISIT (OUTPATIENT)
Dept: FAMILY MEDICINE | Facility: CLINIC | Age: 68
End: 2023-07-14
Payer: MEDICARE

## 2023-07-14 ENCOUNTER — LAB VISIT (OUTPATIENT)
Dept: LAB | Facility: HOSPITAL | Age: 68
End: 2023-07-14
Attending: INTERNAL MEDICINE
Payer: MEDICARE

## 2023-07-14 ENCOUNTER — CLINICAL SUPPORT (OUTPATIENT)
Dept: REHABILITATION | Facility: HOSPITAL | Age: 68
End: 2023-07-14
Attending: PSYCHIATRY & NEUROLOGY
Payer: MEDICARE

## 2023-07-14 VITALS
BODY MASS INDEX: 29.26 KG/M2 | OXYGEN SATURATION: 97 % | HEIGHT: 70 IN | HEART RATE: 75 BPM | WEIGHT: 204.38 LBS | DIASTOLIC BLOOD PRESSURE: 74 MMHG | SYSTOLIC BLOOD PRESSURE: 136 MMHG

## 2023-07-14 DIAGNOSIS — R29.898 LEG WEAKNESS, BILATERAL: Primary | ICD-10-CM

## 2023-07-14 DIAGNOSIS — E11.319 TYPE 2 DIABETES MELLITUS WITH RETINOPATHY OF BOTH EYES, WITH LONG-TERM CURRENT USE OF INSULIN, MACULAR EDEMA PRESENCE UNSPECIFIED, UNSPECIFIED RETINOPATHY SEVERITY: Primary | ICD-10-CM

## 2023-07-14 DIAGNOSIS — R80.9 TYPE 2 DIABETES MELLITUS WITH MICROALBUMINURIA, WITH LONG-TERM CURRENT USE OF INSULIN: ICD-10-CM

## 2023-07-14 DIAGNOSIS — E11.29 TYPE 2 DIABETES MELLITUS WITH MICROALBUMINURIA, WITH LONG-TERM CURRENT USE OF INSULIN: ICD-10-CM

## 2023-07-14 DIAGNOSIS — E11.319 TYPE 2 DIABETES MELLITUS WITH RETINOPATHY OF BOTH EYES, WITH LONG-TERM CURRENT USE OF INSULIN, MACULAR EDEMA PRESENCE UNSPECIFIED, UNSPECIFIED RETINOPATHY SEVERITY: ICD-10-CM

## 2023-07-14 DIAGNOSIS — Z79.4 TYPE 2 DIABETES MELLITUS WITH DIABETIC POLYNEUROPATHY, WITH LONG-TERM CURRENT USE OF INSULIN: ICD-10-CM

## 2023-07-14 DIAGNOSIS — Z79.4 TYPE 2 DIABETES MELLITUS WITH RETINOPATHY OF BOTH EYES, WITH LONG-TERM CURRENT USE OF INSULIN, MACULAR EDEMA PRESENCE UNSPECIFIED, UNSPECIFIED RETINOPATHY SEVERITY: Primary | ICD-10-CM

## 2023-07-14 DIAGNOSIS — E11.42 TYPE 2 DIABETES MELLITUS WITH DIABETIC POLYNEUROPATHY, WITH LONG-TERM CURRENT USE OF INSULIN: ICD-10-CM

## 2023-07-14 DIAGNOSIS — Z79.4 TYPE 2 DIABETES MELLITUS WITH RETINOPATHY OF BOTH EYES, WITH LONG-TERM CURRENT USE OF INSULIN, MACULAR EDEMA PRESENCE UNSPECIFIED, UNSPECIFIED RETINOPATHY SEVERITY: ICD-10-CM

## 2023-07-14 DIAGNOSIS — Z79.4 TYPE 2 DIABETES MELLITUS WITH MICROALBUMINURIA, WITH LONG-TERM CURRENT USE OF INSULIN: ICD-10-CM

## 2023-07-14 DIAGNOSIS — R26.9 GAIT DIFFICULTY: ICD-10-CM

## 2023-07-14 LAB
ESTIMATED AVG GLUCOSE: 180 MG/DL (ref 68–131)
HBA1C MFR BLD: 7.9 % (ref 4–5.6)

## 2023-07-14 PROCEDURE — 99213 PR OFFICE/OUTPT VISIT, EST, LEVL III, 20-29 MIN: ICD-10-PCS | Mod: S$PBB,,, | Performed by: INTERNAL MEDICINE

## 2023-07-14 PROCEDURE — 99999 PR PBB SHADOW E&M-EST. PATIENT-LVL IV: ICD-10-PCS | Mod: PBBFAC,,, | Performed by: INTERNAL MEDICINE

## 2023-07-14 PROCEDURE — 99999 PR PBB SHADOW E&M-EST. PATIENT-LVL IV: CPT | Mod: PBBFAC,,, | Performed by: INTERNAL MEDICINE

## 2023-07-14 PROCEDURE — 97530 THERAPEUTIC ACTIVITIES: CPT | Mod: PO,CQ

## 2023-07-14 PROCEDURE — 97110 THERAPEUTIC EXERCISES: CPT | Mod: PO,CQ

## 2023-07-14 PROCEDURE — 83036 HEMOGLOBIN GLYCOSYLATED A1C: CPT | Performed by: INTERNAL MEDICINE

## 2023-07-14 PROCEDURE — 99213 OFFICE O/P EST LOW 20 MIN: CPT | Mod: S$PBB,,, | Performed by: INTERNAL MEDICINE

## 2023-07-14 PROCEDURE — 36415 COLL VENOUS BLD VENIPUNCTURE: CPT | Mod: PO | Performed by: INTERNAL MEDICINE

## 2023-07-14 PROCEDURE — 99214 OFFICE O/P EST MOD 30 MIN: CPT | Mod: PBBFAC,PO | Performed by: INTERNAL MEDICINE

## 2023-07-14 NOTE — PROGRESS NOTES
[facility] OUTPATIENT THERAPY AND WELLNESS   Physical Therapy Treatment Note     Name: Mikey [facility] Number: [id number]    Therapy Diaosis:   Encounter Diagnoses   Name Primary?    Leg weakness, bilateral Yes    Gait difficulty        Physician: Eloisa Frank,*    Visit Date: 7/14/2023  Physician Orders: PT Eval and Treat   Medical Diagnosis from Referral: E11.44 (ICD-10-CM) - Diabetic amyotrophy associated with type 2 diabetes mellitus      Evaluation Date: 11/9/2022  Authorization Period Expiration: 12/31/22  Plan of Care Expiration: 07/11/2023  Visit # / Visits authorized: 22/20     Time In: 3:45 PM  Time Out: 4:30  Total Billable Time:45 minutes     Precautions: Standard, Diabetes and Fall        SUBJECTIVE     Pt reports: that he was  tired and had some gen mm soreness the day  post last tx.   He was not compliant with home exercise program.  Response to previous treatment: no adverse effects   Functional change: none to date    Pain: 0/10  Location:       OBJECTIVE     Objective Measures updated at progress report unless specified.     Treatment     Mikey received the treatments listed below:      therapeutic exercises to develop strength, endurance, ROM, flexibility, posture, and core stabilization for 30 minutes including  Nu-step 10 min L2  GSS on incline 2 min  Precore Leg Press 20# 20x  Precore calf Press 20# 20x  Precore knee ext 15# 20x  Precore leg curl 35# 20x  Precore Hip ABD 35#20x  Precore Hip ADD 35# 20x  Standing hip ext on Shuttle 12.5# 20x      Not performed today    Heel Raise 20x NP  Standing hip ext 20x 3#  Standing hip ABD 20x 3#  Mini Squat 20x NP  LAQ 5# 20xNP  Step tap at stairs 20x B    NP        Lat step down 4 inch 20x B NP      Not performed   SLR 20x 2# on R  Bridging 20x  SL hip ABD 20x 2# on R  Supine hip ADD with ball with PPT 3 sec hold x 3 min    Therapeutic activity 15 min  Treadmill x 10 min for gt, endurance, VCs for step length and R  foot DF from .8 mph up to 1.8  mph.   AMB on turf with no AD with SBA x 52 ft.  Sit to stand from 24 inch box 10x     neuromuscular re-education activities to improve: Balance, Coordination, Kinesthetic, Sense, Proprioception, and Posture for 00 minutes. The following activities were included:  Marching at stairs 2# 20x each  Standing balance 4x1 min   -hip width   -feet together 3x 30 sec  -tandem each 3x 30 sec      Patient Education and Home Exercises     Home Exercises Provided and Patient Education Provided     Education provided:   - importance of activity  -maintenance of glucose levels    Written Home Exercises Provided: Patient instructed to cont prior HEP. Exercises were reviewed and Mikey was able to demonstrate them prior to the end of the session.  Mikey demonstrated good  understanding of the education provided. See EMR under Patient Instructions for exercises provided during therapy sessions    ASSESSMENT     Mikey adryan today's tx with progression of ther ex and ther act well . He was able to demonstrate good form, achieving full upright posture with sit to stand today. He amb on turf with SBA x 52 ft surpassing LTG.   Patient continues to struggle with functional mobility, HEP compliance.     Mikey Is progressing well towards his goals.   Pt prognosis is Good.     Pt will continue to benefit from skilled outpatient physical therapy to address the deficits listed in the problem list box on initial evaluation, provide pt/family education and to maximize pt's level of independence in the home and community environment.     Pt's spiritual, cultural and educational needs considered and pt agreeable to plan of care and goals.     Anticipated barriers to physical therapy: chronicity, uncontrolled diabetes, Diabetic amyotrophy    Goals:   Short Term Goals: 3-4 weeks   Increase hip flexion  strength by 1/2 grade, progressing, not met  Increase hip Abd strength by 1/2 grade, not met  Increase left ankle EV/IV by 1/2 grade, progressing, not  met  Increase sit to  30 sec by 1-2 or more reps, progressing, not met  Decrease TUG by 2 secs or more, met     Long Term Goals: 8 weeks   Able to ambulate on tuff field w/o assisted device x 40 feet safely, progressing,  met  Increase sit to  30 sec to 10 or more, progressing, not met  Able to stand narrow stance w/o sway, progressing, not met  Able to ambulate with walking stick x 400 feet w/o break, met (achieved 631 ft)  Increase LE strength to 4+/5 grossly, progressing, not met    PLAN     Plan of care Certification: 5/11/2023-7/11/2023     Outpatient Physical Therapy 2 times weekly for 8 weeks to include the following interventions: Gait Training, Manual Therapy, Neuromuscular Re-ed, Patient Education, Self Care, Therapeutic Activities and Therapeutic Exercise    Steve Coy, PTA

## 2023-07-14 NOTE — PROGRESS NOTES
"  Subjective     Mikey Ramirez is a 67 y.o. old, male here for Follow-up    67-year-old with PMH of Type 2 IDDM with neuropathy/retinopathy, HTN, HLD, diabetic amyotrophy    Reduced his lantus to 50 units, BG's .  BG's spike some with meals but rarely go >180.  He has a CGM, reviewed results today. >50% in range.    ROS  Medications     Outpatient Medications Marked as Taking for the 7/14/23 encounter (Office Visit) with Branden Gallardo MD   Medication Sig Dispense Refill    aspirin (ECOTRIN) 81 MG EC tablet Take 81 mg by mouth once daily.      blood sugar diagnostic (FREESTYLE LITE STRIPS) Strp USE TO CHECK BLOOD GLUCOSE ONCE DAILY 200 strip 3    blood-glucose meter kit To check BG 1 times daily, to use with insurance preferred meter. ICD10: E11. 1 each 1    blood-glucose meter,continuous (DEXCOM G6 ) Misc Use as directed 1 each 1    bumetanide (BUMEX) 0.5 MG Tab Take 1 tablet (0.5 mg total) by mouth once daily. 90 tablet 3    flash glucose scanning reader (FREESTYLE COURTNEY 14 DAY READER) Misc use As directed 2 each 3    flash glucose sensor (FREESTYLE COURTNEY 14 DAY SENSOR) Kit As directed 6 kit 3    gabapentin (NEURONTIN) 300 MG capsule Take 1 capsule (300 mg total) by mouth 3 (three) times daily. 270 capsule 3    insulin (LANTUS SOLOSTAR U-100 INSULIN) glargine 100 units/mL SubQ pen Inject 52 Units into the skin once daily. 45 mL 0    insulin lispro (HUMALOG KWIKPEN INSULIN) 100 unit/mL pen Inject 10 Units into the skin 3 (three) times daily with meals. 27 mL 3    lancets (FREESTYLE LANCETS) 28 gauge Misc TO CHECK BLOOD GLUCOSE ONCE DAILY 100 each 2    lisinopriL (PRINIVIL,ZESTRIL) 20 MG tablet Take 1 tablet (20 mg total) by mouth once daily. 90 tablet 3    LUTEIN ORAL Take by mouth.      pen needle, diabetic (SURE COMFORT PEN NEEDLE) 32 gauge x 1/4" Ndle Use to administer insulin under the skin four (4) times a day; discard pen needle after each use. 400 each 3     Objective     /74   Pulse " "75   Ht 5' 10" (1.778 m)   Wt 92.7 kg (204 lb 5.9 oz)   SpO2 97%   BMI 29.32 kg/m²   Physical Exam  Constitutional:       General: He is not in acute distress.     Appearance: Normal appearance. He is well-developed.   Neurological:      Mental Status: He is alert.     Assessment and Plan     Type 2 diabetes mellitus with retinopathy of both eyes, with long-term current use of insulin, macular edema presence unspecified, unspecified retinopathy severity  -     Hemoglobin A1C; Future; Expected date: 07/14/2023    Type 2 diabetes mellitus with microalbuminuria, with long-term current use of insulin  -     Hemoglobin A1C; Future; Expected date: 07/14/2023    Type 2 diabetes mellitus with diabetic polyneuropathy, with long-term current use of insulin  -     Hemoglobin A1C; Future; Expected date: 07/14/2023        Follow up in about 3 months (around 10/14/2023).  ___________________  Branden Gallardo MD  Internal Medicine and Pediatrics  "

## 2023-07-20 ENCOUNTER — CLINICAL SUPPORT (OUTPATIENT)
Dept: REHABILITATION | Facility: HOSPITAL | Age: 68
End: 2023-07-20
Attending: PSYCHIATRY & NEUROLOGY
Payer: MEDICARE

## 2023-07-20 DIAGNOSIS — R26.9 GAIT DIFFICULTY: ICD-10-CM

## 2023-07-20 DIAGNOSIS — R29.898 LEG WEAKNESS, BILATERAL: Primary | ICD-10-CM

## 2023-07-20 PROCEDURE — 97530 THERAPEUTIC ACTIVITIES: CPT | Mod: PO

## 2023-07-20 PROCEDURE — 97112 NEUROMUSCULAR REEDUCATION: CPT | Mod: PO

## 2023-07-20 PROCEDURE — 97110 THERAPEUTIC EXERCISES: CPT | Mod: PO

## 2023-07-20 NOTE — PROGRESS NOTES
[facility]PAT[facility]ERAPY AND [facility]LL[facility]   Physical Therapy Treatment Note     Name: Mikey [facility] Number: [id number]    Therapy Diaosis:   Encounter Diagnoses   Name Primary?    Leg weakness, bilateral Yes    Gait difficulty        Physician: Eloisa Frank,*    Visit Date: 7/20/2023  Physician Orders: PT Eval and Treat   Medical Diagnosis from Referral: E11.44 (ICD-10-CM) - Diabetic amyotrophy associated with type 2 diabetes mellitus      Evaluation Date: 11/9/2022  Authorization Period Expiration: 12/31/22  Plan of Care Expiration: 07/11/2023  Visit # / Visits authorized: 22/20     Time In: 1700  Time Out: 1800  Total Billable Time: 40 minutes     Precautions: Standard, Diabetes and Fall        SUBJECTIVE     Pt reports: that he was tired and had some gen mm soreness the day  post last tx.   He was not compliant with home exercise program.  Response to previous treatment: no adverse effects   Functional change: none to date    Pain: 0/10  Location:       OBJECTIVE     Objective Measures updated at progress report unless specified.     Treatment     Mikey received the treatments listed below:      therapeutic exercises to develop strength, endurance, ROM, flexibility, posture, and core stabilization for 40 minutes including  Treadmill x 10 min   GSS on incline 2 min  Precore Leg Press 20# 20x  Precore calf Press 20# 20x  Precore knee ext 15# 20x  Precore leg curl 35# 20x    HELD  Precore Hip ABD 35#20x  Precore Hip ADD 35# 20x  Standing hip ext on Shuttle 12.5# 20x        Therapeutic activity 10 min:  AMB on turf with no AD with SBA x 52 ft.  Sit to stand from 24 inch box 10x     neuromuscular re-education activities to improve: Balance, Coordination, Kinesthetic, Sense, Proprioception, and Posture for 10 minutes. The following activities were included:    Standing balance 4x1 min   -Marching  -hip width   -feet together 3x 30 sec  -tandem each 3x 30 sec      Patient Education and Home Exercises     Home  Exercises Provided and Patient Education Provided     Education provided:   - importance of activity  -maintenance of glucose levels    Written Home Exercises Provided: Patient instructed to cont prior HEP. Exercises were reviewed and Mikey was able to demonstrate them prior to the end of the session.  Mikey demonstrated good  understanding of the education provided. See EMR under Patient Instructions for exercises provided during therapy sessions    ASSESSMENT     Reviewed remaining Plan of Care with patient. Patient has been seen for his current condition 59 times with little to no progression. Despite being under new plan of care for treating therapist for 4 weeks, patient has demonstrated poor compliance and inconsistency with attending session. Inconsistency leading to prolonged plateau in progression. Patient to finish attending remaining 2 session and d/c to independent program. Patient urged to obtain gym membership to continue with developed knowledge of gym equipment and fitness education.    Mikey Is progressing well towards his goals.   Pt prognosis is Good.     Pt will continue to benefit from skilled outpatient physical therapy to address the deficits listed in the problem list box on initial evaluation, provide pt/family education and to maximize pt's level of independence in the home and community environment.     Pt's spiritual, cultural and educational needs considered and pt agreeable to plan of care and goals.     Anticipated barriers to physical therapy: chronicity, uncontrolled diabetes, Diabetic amyotrophy    Goals:   Short Term Goals: 3-4 weeks   Increase hip flexion  strength by 1/2 grade, progressing, not met  Increase hip Abd strength by 1/2 grade, not met  Increase left ankle EV/IV by 1/2 grade, progressing, not met  Increase sit to  30 sec by 1-2 or more reps, progressing, not met  Decrease TUG by 2 secs or more, met     Long Term Goals: 8 weeks   Able to ambulate on tuff field w/o  assisted device x 40 feet safely, progressing,  met  Increase sit to  30 sec to 10 or more, progressing, not met  Able to stand narrow stance w/o sway, progressing, not met  Able to ambulate with walking stick x 400 feet w/o break, met (achieved 631 ft)  Increase LE strength to 4+/5 grossly, progressing, not met    PLAN     Plan of care Certification: 5/11/2023-7/11/2023     Outpatient Physical Therapy 2 times weekly for 8 weeks to include the following interventions: Gait Training, Manual Therapy, Neuromuscular Re-ed, Patient Education, Self Care, Therapeutic Activities and Therapeutic Exercise    Kevin Marques, PT

## 2023-07-25 ENCOUNTER — CLINICAL SUPPORT (OUTPATIENT)
Dept: REHABILITATION | Facility: HOSPITAL | Age: 68
End: 2023-07-25
Attending: PSYCHIATRY & NEUROLOGY
Payer: MEDICARE

## 2023-07-25 DIAGNOSIS — R29.898 LEG WEAKNESS, BILATERAL: Primary | ICD-10-CM

## 2023-07-25 DIAGNOSIS — R26.9 GAIT DIFFICULTY: ICD-10-CM

## 2023-07-25 PROCEDURE — 97110 THERAPEUTIC EXERCISES: CPT | Mod: PO,CQ

## 2023-07-25 NOTE — PROGRESS NOTES
[facility]   Physical Therapy Treatment Note     Name: Mikey [place]  Clinic Number: [id number]    Therapy Diaosis:   Encounter Diagnoses   Name Primary?    Leg weakness, bilateral Yes    Gait difficulty        Physician: Eloisa Frank,*    Visit Date: 7/25/2023  Physician Orders: PT Eval and Treat   Medical Diagnosis from Referral: E11.44 (ICD-10-CM) - Diabetic amyotrophy associated with type 2 diabetes mellitus      Evaluation Date: 11/9/2022  Authorization Period Expiration: 12/31/22  Plan of Care Expiration: 07/11/2023  Visit # / Visits authorized: 23/20     Time In: 4:45 PM  Time Out: 5:30  Total Billable Time: 45 minutes     Precautions: Standard, Diabetes and Fall        SUBJECTIVE     Pt reports: that he feels that his ankles are a little more swollen today which may be a result of less activity at work today.   He was not compliant with home exercise program.  Response to previous treatment: no adverse effects   Functional change: none to date    Pain: 0/10  Location:       OBJECTIVE     Objective Measures updated at progress report unless specified.     Treatment     Mikey received the treatments listed below:      therapeutic exercises to develop strength, endurance, ROM, flexibility, posture, and core stabilization for 40 minutes including  Treadmill x 10 min   GSS on incline 2 min  Precore Leg Press 30# 20x  Precore calf Press 30# 20x  Precore knee ext 15# 20x  Precore leg curl 35# 20x  Precore Hip ABD 35#20x  Precore Hip ADD 35# 20x  Matrix hip flex 10# 20x    Not today  Standing hip ext on Shuttle 12.5# 20x        Therapeutic activity 5 min:  AMB on turf with no AD with SBA x 52 ft.NP  Sit to stand from 24 inch box 20x with 10# kettle bell    neuromuscular re-education activities to improve: Balance, Coordination, Kinesthetic, Sense, Proprioception, and Posture for 00 minutes. The following activities were included:    Standing balance 4x1 min   -Marching  -hip width    -feet together 3x 30 sec  -tandem each 3x 30 sec      Patient Education and Home Exercises     Home Exercises Provided and Patient Education Provided     Education provided:   - importance of activity  -maintenance of glucose levels    Written Home Exercises Provided: Patient instructed to cont prior HEP. Exercises were reviewed and Mikey was able to demonstrate them prior to the end of the session.  Mikey demonstrated good  understanding of the education provided. See EMR under Patient Instructions for exercises provided during therapy sessions    ASSESSMENT     Mikey adryan today's tx with progression of ther ex well. He was w/o complaint pn with exs, however did report several incidence of fatigue requiring sitting rest breaks to recover.  Patient to finish attending remaining 2 session and d/c to independent program. Patient urged to obtain gym membership to continue with developed knowledge of gym equipment and fitness education.    Mikey Is progressing well towards his goals.   Pt prognosis is Good.     Pt will continue to benefit from skilled outpatient physical therapy to address the deficits listed in the problem list box on initial evaluation, provide pt/family education and to maximize pt's level of independence in the home and community environment.     Pt's spiritual, cultural and educational needs considered and pt agreeable to plan of care and goals.     Anticipated barriers to physical therapy: chronicity, uncontrolled diabetes, Diabetic amyotrophy    Goals:   Short Term Goals: 3-4 weeks   Increase hip flexion  strength by 1/2 grade, progressing, not met  Increase hip Abd strength by 1/2 grade, not met  Increase left ankle EV/IV by 1/2 grade, progressing, not met  Increase sit to  30 sec by 1-2 or more reps, progressing, not met  Decrease TUG by 2 secs or more, met     Long Term Goals: 8 weeks   Able to ambulate on tuff field w/o assisted device x 40 feet safely, progressing,  met  Increase sit to   30 sec to 10 or more, progressing, not met  Able to stand narrow stance w/o sway, progressing, not met  Able to ambulate with walking stick x 400 feet w/o break, met (achieved 631 ft)  Increase LE strength to 4+/5 grossly, progressing, not met    PLAN     Plan of care Certification: 5/11/2023-7/11/2023     Outpatient Physical Therapy 2 times weekly for 8 weeks to include the following interventions: Gait Training, Manual Therapy, Neuromuscular Re-ed, Patient Education, Self Care, Therapeutic Activities and Therapeutic Exercise    Steve Coy, PTA

## 2023-09-01 ENCOUNTER — PATIENT MESSAGE (OUTPATIENT)
Dept: ADMINISTRATIVE | Facility: HOSPITAL | Age: 68
End: 2023-09-01
Payer: MEDICARE

## 2024-01-22 ENCOUNTER — LAB VISIT (OUTPATIENT)
Dept: LAB | Facility: HOSPITAL | Age: 69
End: 2024-01-22
Attending: INTERNAL MEDICINE
Payer: MEDICARE

## 2024-01-22 ENCOUNTER — OFFICE VISIT (OUTPATIENT)
Dept: FAMILY MEDICINE | Facility: CLINIC | Age: 69
End: 2024-01-22
Payer: MEDICARE

## 2024-01-22 VITALS
HEART RATE: 76 BPM | BODY MASS INDEX: 29.76 KG/M2 | WEIGHT: 207.88 LBS | OXYGEN SATURATION: 100 % | HEIGHT: 70 IN | SYSTOLIC BLOOD PRESSURE: 138 MMHG | DIASTOLIC BLOOD PRESSURE: 78 MMHG

## 2024-01-22 DIAGNOSIS — E11.29 TYPE 2 DIABETES MELLITUS WITH MICROALBUMINURIA, WITH LONG-TERM CURRENT USE OF INSULIN: ICD-10-CM

## 2024-01-22 DIAGNOSIS — Z79.4 TYPE 2 DIABETES MELLITUS WITH DIABETIC POLYNEUROPATHY, WITH LONG-TERM CURRENT USE OF INSULIN: ICD-10-CM

## 2024-01-22 DIAGNOSIS — Z79.4 TYPE 2 DIABETES MELLITUS WITH RETINOPATHY OF BOTH EYES, WITH LONG-TERM CURRENT USE OF INSULIN, MACULAR EDEMA PRESENCE UNSPECIFIED, UNSPECIFIED RETINOPATHY SEVERITY: Primary | ICD-10-CM

## 2024-01-22 DIAGNOSIS — Z12.11 SCREENING FOR COLON CANCER: ICD-10-CM

## 2024-01-22 DIAGNOSIS — R60.0 BILATERAL LOWER EXTREMITY EDEMA: ICD-10-CM

## 2024-01-22 DIAGNOSIS — E11.319 TYPE 2 DIABETES MELLITUS WITH RETINOPATHY OF BOTH EYES, WITH LONG-TERM CURRENT USE OF INSULIN, MACULAR EDEMA PRESENCE UNSPECIFIED, UNSPECIFIED RETINOPATHY SEVERITY: Primary | ICD-10-CM

## 2024-01-22 DIAGNOSIS — R80.9 TYPE 2 DIABETES MELLITUS WITH MICROALBUMINURIA, WITH LONG-TERM CURRENT USE OF INSULIN: ICD-10-CM

## 2024-01-22 DIAGNOSIS — Z79.4 TYPE 2 DIABETES MELLITUS WITH MICROALBUMINURIA, WITH LONG-TERM CURRENT USE OF INSULIN: ICD-10-CM

## 2024-01-22 DIAGNOSIS — R29.898 WEAKNESS OF BOTH LOWER EXTREMITIES: ICD-10-CM

## 2024-01-22 DIAGNOSIS — G72.0 STATIN MYOPATHY: ICD-10-CM

## 2024-01-22 DIAGNOSIS — E11.42 TYPE 2 DIABETES MELLITUS WITH DIABETIC POLYNEUROPATHY, WITH LONG-TERM CURRENT USE OF INSULIN: ICD-10-CM

## 2024-01-22 DIAGNOSIS — T46.6X5A STATIN MYOPATHY: ICD-10-CM

## 2024-01-22 DIAGNOSIS — E11.319 TYPE 2 DIABETES MELLITUS WITH RETINOPATHY OF BOTH EYES, WITH LONG-TERM CURRENT USE OF INSULIN, MACULAR EDEMA PRESENCE UNSPECIFIED, UNSPECIFIED RETINOPATHY SEVERITY: ICD-10-CM

## 2024-01-22 DIAGNOSIS — Z79.4 TYPE 2 DIABETES MELLITUS WITH RETINOPATHY OF BOTH EYES, WITH LONG-TERM CURRENT USE OF INSULIN, MACULAR EDEMA PRESENCE UNSPECIFIED, UNSPECIFIED RETINOPATHY SEVERITY: ICD-10-CM

## 2024-01-22 DIAGNOSIS — K21.9 GASTROESOPHAGEAL REFLUX DISEASE WITHOUT ESOPHAGITIS: ICD-10-CM

## 2024-01-22 LAB
ALBUMIN SERPL BCP-MCNC: 3.3 G/DL (ref 3.5–5.2)
ALBUMIN/CREAT UR: NORMAL UG/MG (ref 0–30)
ALP SERPL-CCNC: 112 U/L (ref 55–135)
ALT SERPL W/O P-5'-P-CCNC: 58 U/L (ref 10–44)
ANION GAP SERPL CALC-SCNC: 11 MMOL/L (ref 8–16)
AST SERPL-CCNC: 45 U/L (ref 10–40)
BILIRUB SERPL-MCNC: 0.5 MG/DL (ref 0.1–1)
BUN SERPL-MCNC: 22 MG/DL (ref 8–23)
CALCIUM SERPL-MCNC: 9.3 MG/DL (ref 8.7–10.5)
CHLORIDE SERPL-SCNC: 104 MMOL/L (ref 95–110)
CHOLEST SERPL-MCNC: 202 MG/DL (ref 120–199)
CHOLEST/HDLC SERPL: 3.7 {RATIO} (ref 2–5)
CO2 SERPL-SCNC: 23 MMOL/L (ref 23–29)
CREAT SERPL-MCNC: 0.8 MG/DL (ref 0.5–1.4)
CREAT UR-MCNC: 62 MG/DL (ref 23–375)
ERYTHROCYTE [DISTWIDTH] IN BLOOD BY AUTOMATED COUNT: 13.9 % (ref 11.5–14.5)
EST. GFR  (NO RACE VARIABLE): >60 ML/MIN/1.73 M^2
ESTIMATED AVG GLUCOSE: 186 MG/DL (ref 68–131)
GLUCOSE SERPL-MCNC: 229 MG/DL (ref 70–110)
HBA1C MFR BLD: 8.1 % (ref 4–5.6)
HCT VFR BLD AUTO: 36.1 % (ref 40–54)
HDLC SERPL-MCNC: 55 MG/DL (ref 40–75)
HDLC SERPL: 27.2 % (ref 20–50)
HGB BLD-MCNC: 11.5 G/DL (ref 14–18)
LDLC SERPL CALC-MCNC: 125.4 MG/DL (ref 63–159)
MCH RBC QN AUTO: 27.1 PG (ref 27–31)
MCHC RBC AUTO-ENTMCNC: 31.9 G/DL (ref 32–36)
MCV RBC AUTO: 85 FL (ref 82–98)
MICROALBUMIN UR DL<=1MG/L-MCNC: <5 UG/ML
NONHDLC SERPL-MCNC: 147 MG/DL
PLATELET # BLD AUTO: 480 K/UL (ref 150–450)
PMV BLD AUTO: 11.1 FL (ref 9.2–12.9)
POTASSIUM SERPL-SCNC: 4.7 MMOL/L (ref 3.5–5.1)
PROT SERPL-MCNC: 6.4 G/DL (ref 6–8.4)
RBC # BLD AUTO: 4.24 M/UL (ref 4.6–6.2)
SODIUM SERPL-SCNC: 138 MMOL/L (ref 136–145)
TRIGL SERPL-MCNC: 108 MG/DL (ref 30–150)
WBC # BLD AUTO: 5.66 K/UL (ref 3.9–12.7)

## 2024-01-22 PROCEDURE — 80061 LIPID PANEL: CPT | Performed by: INTERNAL MEDICINE

## 2024-01-22 PROCEDURE — 85027 COMPLETE CBC AUTOMATED: CPT | Performed by: INTERNAL MEDICINE

## 2024-01-22 PROCEDURE — 82043 UR ALBUMIN QUANTITATIVE: CPT | Performed by: INTERNAL MEDICINE

## 2024-01-22 PROCEDURE — 99214 OFFICE O/P EST MOD 30 MIN: CPT | Mod: S$PBB,,, | Performed by: INTERNAL MEDICINE

## 2024-01-22 PROCEDURE — 83036 HEMOGLOBIN GLYCOSYLATED A1C: CPT | Performed by: INTERNAL MEDICINE

## 2024-01-22 PROCEDURE — 36415 COLL VENOUS BLD VENIPUNCTURE: CPT | Mod: PO | Performed by: INTERNAL MEDICINE

## 2024-01-22 PROCEDURE — 80053 COMPREHEN METABOLIC PANEL: CPT | Performed by: INTERNAL MEDICINE

## 2024-01-22 PROCEDURE — 99214 OFFICE O/P EST MOD 30 MIN: CPT | Mod: PBBFAC,PO | Performed by: INTERNAL MEDICINE

## 2024-01-22 PROCEDURE — 99999 PR PBB SHADOW E&M-EST. PATIENT-LVL IV: CPT | Mod: PBBFAC,,, | Performed by: INTERNAL MEDICINE

## 2024-01-22 RX ORDER — PANTOPRAZOLE SODIUM 40 MG/1
40 TABLET, DELAYED RELEASE ORAL DAILY
Qty: 90 TABLET | Refills: 3 | Status: SHIPPED | OUTPATIENT
Start: 2024-01-22 | End: 2025-01-21

## 2024-01-22 RX ORDER — GABAPENTIN 300 MG/1
300 CAPSULE ORAL 3 TIMES DAILY
Qty: 270 CAPSULE | Refills: 3 | Status: SHIPPED | OUTPATIENT
Start: 2024-01-22

## 2024-01-22 NOTE — PROGRESS NOTES
Subjective     Mikey Ramirez is a 68 y.o. old, male here for Diabetes (F/u)    68 year-old with PMH of Type 2 IDDM with neuropathy/retinopathy, HTN, HLD, diabetic amyotrophy       Type 2 IDDM: Using dexcom, taking lantus depending on BG's, sometimes skipping a day all together. He thankfully gets notified of hypoglycemia which has sometimes happened in the middle of the night.  Neuropathy stable on gabapentin.  He would like some more PT for his LE strength which has decreased recently.  He is due for an eye exam, has a history of retinopathy, stress how important this is.  HTN under adequate control, up a little bit today.  Worsening GERD symptoms the past several months. He is using OTC meds but having persistent symptoms.  LE edema managed fairly well with bumex.  H/o statin intolerance/myopathy.    ROS  Medications     Outpatient Medications Marked as Taking for the 1/22/24 encounter (Office Visit) with Branden Gallardo MD   Medication Sig Dispense Refill    aspirin (ECOTRIN) 81 MG EC tablet Take 81 mg by mouth once daily.      blood sugar diagnostic (FREESTYLE LITE STRIPS) Strp USE TO CHECK BLOOD GLUCOSE ONCE DAILY 200 strip 3    blood-glucose meter kit To check BG 1 times daily, to use with insurance preferred meter. ICD10: E11. 1 each 1    blood-glucose meter,continuous (DEXCOM G6 ) Misc Use as directed 1 each 1    bumetanide (BUMEX) 0.5 MG Tab Take 1 tablet (0.5 mg total) by mouth once daily. 90 tablet 3    flash glucose scanning reader (FREESTYLE COURTNEY 14 DAY READER) Misc use As directed 2 each 3    flash glucose sensor (FREESTYLE COURTNEY 14 DAY SENSOR) Kit As directed 6 kit 3    insulin (LANTUS SOLOSTAR U-100 INSULIN) glargine 100 units/mL SubQ pen Inject 52 Units into the skin once daily. 45 mL 0    lancets (FREESTYLE LANCETS) 28 gauge Misc TO CHECK BLOOD GLUCOSE ONCE DAILY 100 each 2    lisinopriL (PRINIVIL,ZESTRIL) 20 MG tablet Take 1 tablet (20 mg total) by mouth once daily. 90 tablet 3     "LUTEIN ORAL Take by mouth.      pen needle, diabetic (SURE COMFORT PEN NEEDLE) 32 gauge x 1/4" Ndle Use to administer insulin under the skin four (4) times a day; discard pen needle after each use. 400 each 3    [DISCONTINUED] gabapentin (NEURONTIN) 300 MG capsule Take 1 capsule (300 mg total) by mouth 3 (three) times daily. 270 capsule 3     Objective     /78   Pulse 76   Ht 5' 10" (1.778 m)   Wt 94.3 kg (207 lb 14.3 oz)   SpO2 100%   BMI 29.83 kg/m²   Physical Exam  Assessment and Plan     Type 2 diabetes mellitus with retinopathy of both eyes, with long-term current use of insulin, macular edema presence unspecified, unspecified retinopathy severity  -     Comprehensive Metabolic Panel; Future; Expected date: 01/22/2024  -     Lipid Panel; Future; Expected date: 01/22/2024  -     CBC Without Differential; Future; Expected date: 01/22/2024  -     Hemoglobin A1C; Future; Expected date: 01/22/2024  -     Microalbumin/Creatinine Ratio, Urine; Future; Expected date: 01/22/2024  -     Ambulatory referral/consult to Optometry; Future; Expected date: 01/29/2024    Type 2 diabetes mellitus with microalbuminuria, with long-term current use of insulin    Type 2 diabetes mellitus with diabetic polyneuropathy, with long-term current use of insulin  -     gabapentin (NEURONTIN) 300 MG capsule; Take 1 capsule (300 mg total) by mouth 3 (three) times daily.  Dispense: 270 capsule; Refill: 3    Screening for colon cancer  -     Fecal Immunochemical Test (iFOBT); Future; Expected date: 01/22/2024    Bilateral lower extremity edema    Weakness of both lower extremities  -     Ambulatory referral/consult to Physical/Occupational Therapy; Future; Expected date: 01/29/2024    Gastroesophageal reflux disease without esophagitis  -     pantoprazole (PROTONIX) 40 MG tablet; Take 1 tablet (40 mg total) by mouth once daily.  Dispense: 90 tablet; Refill: 3    Statin myopathy      ___________________  Branden Gallardo MD  Internal " Medicine and Pediatrics

## 2024-02-21 DIAGNOSIS — R60.0 BILATERAL LOWER EXTREMITY EDEMA: ICD-10-CM

## 2024-02-21 RX ORDER — BUMETANIDE 0.5 MG/1
0.5 TABLET ORAL
Qty: 90 TABLET | Refills: 3 | Status: SHIPPED | OUTPATIENT
Start: 2024-02-21

## 2024-02-21 NOTE — TELEPHONE ENCOUNTER
No care due was identified.  Health [facility] Embedded Care Due Messages. Reference number: [id number].   2/21/2024 1:02:52 AM CST

## 2024-02-21 NOTE — TELEPHONE ENCOUNTER
Refill Decision Note   Mikey Ramirez  is requesting a refill authorization.  Brief Assessment and Rationale for Refill:  Approve     Medication Therapy Plan:         Comments:     Note composed:4:25 AM 02/21/2024

## 2024-02-27 DIAGNOSIS — Z00.00 ENCOUNTER FOR MEDICARE ANNUAL WELLNESS EXAM: ICD-10-CM

## 2024-03-07 DIAGNOSIS — Z79.4 TYPE 2 DIABETES MELLITUS WITH DIABETIC POLYNEUROPATHY, WITH LONG-TERM CURRENT USE OF INSULIN: ICD-10-CM

## 2024-03-07 DIAGNOSIS — E11.42 TYPE 2 DIABETES MELLITUS WITH DIABETIC POLYNEUROPATHY, WITH LONG-TERM CURRENT USE OF INSULIN: ICD-10-CM

## 2024-03-07 RX ORDER — INSULIN GLARGINE 100 [IU]/ML
INJECTION, SOLUTION SUBCUTANEOUS
Qty: 45 ML | Refills: 1 | Status: SHIPPED | OUTPATIENT
Start: 2024-03-07

## 2024-03-07 NOTE — TELEPHONE ENCOUNTER
No care due was identified.  [facility] Embedded Care Due Messages. Reference number: [id number].   3/07/2024 1:05:23 AM CST

## 2024-04-11 DIAGNOSIS — E11.29 TYPE 2 DIABETES MELLITUS WITH MICROALBUMINURIA, WITH LONG-TERM CURRENT USE OF INSULIN: ICD-10-CM

## 2024-04-11 DIAGNOSIS — R80.9 TYPE 2 DIABETES MELLITUS WITH MICROALBUMINURIA, WITH LONG-TERM CURRENT USE OF INSULIN: ICD-10-CM

## 2024-04-11 DIAGNOSIS — Z79.4 TYPE 2 DIABETES MELLITUS WITH DIABETIC POLYNEUROPATHY, WITH LONG-TERM CURRENT USE OF INSULIN: ICD-10-CM

## 2024-04-11 DIAGNOSIS — E11.42 TYPE 2 DIABETES MELLITUS WITH DIABETIC POLYNEUROPATHY, WITH LONG-TERM CURRENT USE OF INSULIN: ICD-10-CM

## 2024-04-11 DIAGNOSIS — Z79.4 TYPE 2 DIABETES MELLITUS WITH MICROALBUMINURIA, WITH LONG-TERM CURRENT USE OF INSULIN: ICD-10-CM

## 2024-04-11 NOTE — TELEPHONE ENCOUNTER
No care due was identified.  [facility] Embedded Care Due Messages. Reference number: [id number].   4/11/2024 12:40:02 PM CDT

## 2024-04-12 RX ORDER — LISINOPRIL 20 MG/1
20 TABLET ORAL
Qty: 90 TABLET | Refills: 3 | Status: SHIPPED | OUTPATIENT
Start: 2024-04-12

## 2024-04-12 RX ORDER — INSULIN LISPRO 100 [IU]/ML
INJECTION, SOLUTION INTRAVENOUS; SUBCUTANEOUS
Qty: 30 ML | Refills: 1 | Status: SHIPPED | OUTPATIENT
Start: 2024-04-12

## 2024-04-12 NOTE — TELEPHONE ENCOUNTER
Refill Routing Note   Medication(s) are not appropriate for processing by [facility] for the following reason(s):        No active prescription written by provider    [facility] action(s):  Defer               Appointments  past 12m or future 3m with PCP    Date Provider   Last Visit   1/22/2024 Branden Gallardo MD   Next Visit   7/22/2024 Branden Gallardo MD   ED visits in past 90 days: 0        Note composed:12:22 PM 04/12/2024

## 2024-06-14 ENCOUNTER — TELEPHONE (OUTPATIENT)
Dept: OPTOMETRY | Facility: CLINIC | Age: 69
End: 2024-06-14
Payer: MEDICARE

## 2024-06-14 NOTE — TELEPHONE ENCOUNTER
Left message for pt to return call to reschedule his appt with Dr Esteban on 7/19 at he will be out of the clinic that day.

## 2024-07-19 ENCOUNTER — OFFICE VISIT (OUTPATIENT)
Dept: OPTOMETRY | Facility: CLINIC | Age: 69
End: 2024-07-19
Payer: MEDICARE

## 2024-07-19 DIAGNOSIS — E11.3493 SEVERE NONPROLIFERATIVE DIABETIC RETINOPATHY OF BOTH EYES WITHOUT MACULAR EDEMA ASSOCIATED WITH TYPE 2 DIABETES MELLITUS: Primary | ICD-10-CM

## 2024-07-19 DIAGNOSIS — Z79.4 TYPE 2 DIABETES MELLITUS WITH RETINOPATHY OF BOTH EYES, WITH LONG-TERM CURRENT USE OF INSULIN, MACULAR EDEMA PRESENCE UNSPECIFIED, UNSPECIFIED RETINOPATHY SEVERITY: ICD-10-CM

## 2024-07-19 DIAGNOSIS — Z96.1 PSEUDOPHAKIA: ICD-10-CM

## 2024-07-19 DIAGNOSIS — E11.319 TYPE 2 DIABETES MELLITUS WITH RETINOPATHY OF BOTH EYES, WITH LONG-TERM CURRENT USE OF INSULIN, MACULAR EDEMA PRESENCE UNSPECIFIED, UNSPECIFIED RETINOPATHY SEVERITY: ICD-10-CM

## 2024-07-19 PROCEDURE — 92134 CPTRZ OPH DX IMG PST SGM RTA: CPT | Mod: PBBFAC,PO | Performed by: OPTOMETRIST

## 2024-07-19 PROCEDURE — 99213 OFFICE O/P EST LOW 20 MIN: CPT | Mod: PBBFAC,PO,25 | Performed by: OPTOMETRIST

## 2024-07-19 PROCEDURE — 99999 PR PBB SHADOW E&M-EST. PATIENT-LVL III: CPT | Mod: PBBFAC,,, | Performed by: OPTOMETRIST

## 2024-07-19 PROCEDURE — 92014 COMPRE OPH EXAM EST PT 1/>: CPT | Mod: S$PBB,,, | Performed by: OPTOMETRIST

## 2024-07-19 NOTE — PROGRESS NOTES
HPI    Pt here for annual DM eye exam DLS- 11/17/21    Pt sts his vision has been pretty much stable, using OTC readers.   Occasional floaters that look like a blood cell that moves and then   disappears.   Denies FOL.   Occasional eye tearing last occurred 3 weeks ago, using OTC GTTS PRN with   relief.     Hemoglobin A1C       Date                     Value               Ref Range             Status                01/22/2024               8.1 (H)             4.0 - 5.6 %           Final                Last edited by Michelle Paula on 7/19/2024  3:24 PM.            Assessment /Plan     For exam results, see Encounter Report.    Severe nonproliferative diabetic retinopathy of both eyes without macular edema associated with type 2 diabetes mellitus    Type 2 diabetes mellitus with retinopathy of both eyes, with long-term current use of insulin, macular edema presence unspecified, unspecified retinopathy severity  -     Ambulatory referral/consult to Optometry    Pseudophakia      1,2. Severe retinopathy ou, retinopathy looks worse than last visit 2021 but A1c is lower, oct mac shows no mac edema, RTC 3 mos dfe and oct mac repeat  3. Monitor condition. Patient to report any changes. RTC 1 year recheck.

## 2024-07-23 ENCOUNTER — PATIENT MESSAGE (OUTPATIENT)
Dept: ADMINISTRATIVE | Facility: HOSPITAL | Age: 69
End: 2024-07-23
Payer: MEDICARE

## 2024-07-23 DIAGNOSIS — E11.9 TYPE 2 DIABETES MELLITUS WITHOUT COMPLICATION, UNSPECIFIED WHETHER LONG TERM INSULIN USE: ICD-10-CM

## 2024-07-25 ENCOUNTER — PATIENT OUTREACH (OUTPATIENT)
Dept: ADMINISTRATIVE | Facility: HOSPITAL | Age: 69
End: 2024-07-25
Payer: MEDICARE

## 2024-07-25 NOTE — PROGRESS NOTES
Population Health Chart Review & Patient Outreach Details      Additional [facility] Health Notes:               Updates Requested / Reviewed:      Updated Care Coordination Note, Care Everywhere, , and Immunizations Reconciliation Completed or Queried: [facility] Topics Overdue:      [facility] Score: 2     Colon Cancer Screening  Hemoglobin A1c    Pneumonia Vaccine  Tetanus Vaccine  Shingles/Zoster Vaccine  RSV Vaccine                  Health Maintenance Topic(s) Outreach Outcomes & Actions Taken:    Lab(s) - Outreach Outcomes & Actions Taken  : Overdue Lab(s) Ordered

## 2024-09-16 DIAGNOSIS — Z79.4 TYPE 2 DIABETES MELLITUS WITH DIABETIC POLYNEUROPATHY, WITH LONG-TERM CURRENT USE OF INSULIN: ICD-10-CM

## 2024-09-16 DIAGNOSIS — E11.42 TYPE 2 DIABETES MELLITUS WITH DIABETIC POLYNEUROPATHY, WITH LONG-TERM CURRENT USE OF INSULIN: ICD-10-CM

## 2024-09-16 RX ORDER — PEN NEEDLE, DIABETIC 32GX 5/32"
NEEDLE, DISPOSABLE MISCELLANEOUS
Qty: 400 EACH | Refills: 3 | Status: SHIPPED | OUTPATIENT
Start: 2024-09-16

## 2024-09-16 NOTE — TELEPHONE ENCOUNTER
Care Due:                  Date            Visit Type   Department     Provider  --------------------------------------------------------------------------------                                EP -                              PRIMARY      [facility] FAMILY  Last Visit: 01-      CARE ([facility])   MEDICINE       Branden Gallardo  Next Visit: None Scheduled  None         None Found                                                            Last  Test          Frequency    Reason                     Performed    Due Date  --------------------------------------------------------------------------------    HBA1C.......  6 months...  HUMALOG, insulin.........  01- 07-    [facility] Embedded Care Due Messages. Reference number: [id number].   9/16/2024 8:58:59 AM CDT

## 2024-09-17 NOTE — TELEPHONE ENCOUNTER
Provider Staff:  Action required for this patient    Requires labs      Please see care gap opportunities below in Care Due Message.    Thanks!  [facility]     Appointments      Date Provider   Last Visit   Visit date not found Branden Gallardo MD   Next Visit   Visit date not found Branden Gallardo MD     Refill Decision Note   Mikey Ramirez  is requesting a refill authorization.  Brief Assessment and Rationale for Refill:  Approve     Medication Therapy Plan:         Comments:     Note composed:10:56 PM 09/16/2024

## 2024-09-24 ENCOUNTER — PATIENT MESSAGE (OUTPATIENT)
Dept: ADMINISTRATIVE | Facility: HOSPITAL | Age: 69
End: 2024-09-24
Payer: MEDICARE

## 2024-09-25 DIAGNOSIS — Z12.11 SCREENING FOR COLON CANCER: ICD-10-CM

## 2024-10-02 ENCOUNTER — OFFICE VISIT (OUTPATIENT)
Dept: FAMILY MEDICINE | Facility: CLINIC | Age: 69
End: 2024-10-02
Payer: MEDICARE

## 2024-10-02 ENCOUNTER — LAB VISIT (OUTPATIENT)
Dept: LAB | Facility: HOSPITAL | Age: 69
End: 2024-10-02
Attending: INTERNAL MEDICINE
Payer: MEDICARE

## 2024-10-02 VITALS
WEIGHT: 214.31 LBS | DIASTOLIC BLOOD PRESSURE: 70 MMHG | HEART RATE: 66 BPM | OXYGEN SATURATION: 98 % | SYSTOLIC BLOOD PRESSURE: 138 MMHG | BODY MASS INDEX: 30.68 KG/M2 | HEIGHT: 70 IN

## 2024-10-02 DIAGNOSIS — E11.29 TYPE 2 DIABETES MELLITUS WITH MICROALBUMINURIA, WITH LONG-TERM CURRENT USE OF INSULIN: ICD-10-CM

## 2024-10-02 DIAGNOSIS — E11.319 TYPE 2 DIABETES MELLITUS WITH RETINOPATHY OF BOTH EYES, WITH LONG-TERM CURRENT USE OF INSULIN, MACULAR EDEMA PRESENCE UNSPECIFIED, UNSPECIFIED RETINOPATHY SEVERITY: ICD-10-CM

## 2024-10-02 DIAGNOSIS — Z79.4 TYPE 2 DIABETES MELLITUS WITH MICROALBUMINURIA, WITH LONG-TERM CURRENT USE OF INSULIN: ICD-10-CM

## 2024-10-02 DIAGNOSIS — Z79.4 TYPE 2 DIABETES MELLITUS WITH DIABETIC POLYNEUROPATHY, WITH LONG-TERM CURRENT USE OF INSULIN: Primary | ICD-10-CM

## 2024-10-02 DIAGNOSIS — R80.9 TYPE 2 DIABETES MELLITUS WITH MICROALBUMINURIA, WITH LONG-TERM CURRENT USE OF INSULIN: ICD-10-CM

## 2024-10-02 DIAGNOSIS — E11.42 TYPE 2 DIABETES MELLITUS WITH DIABETIC POLYNEUROPATHY, WITH LONG-TERM CURRENT USE OF INSULIN: Primary | ICD-10-CM

## 2024-10-02 DIAGNOSIS — Z79.4 TYPE 2 DIABETES MELLITUS WITH RETINOPATHY OF BOTH EYES, WITH LONG-TERM CURRENT USE OF INSULIN, MACULAR EDEMA PRESENCE UNSPECIFIED, UNSPECIFIED RETINOPATHY SEVERITY: ICD-10-CM

## 2024-10-02 DIAGNOSIS — E11.42 TYPE 2 DIABETES MELLITUS WITH DIABETIC POLYNEUROPATHY, WITH LONG-TERM CURRENT USE OF INSULIN: ICD-10-CM

## 2024-10-02 DIAGNOSIS — Z79.4 TYPE 2 DIABETES MELLITUS WITH DIABETIC POLYNEUROPATHY, WITH LONG-TERM CURRENT USE OF INSULIN: ICD-10-CM

## 2024-10-02 LAB
ALBUMIN SERPL BCP-MCNC: 3.4 G/DL (ref 3.5–5.2)
ALP SERPL-CCNC: 106 U/L (ref 55–135)
ALT SERPL W/O P-5'-P-CCNC: 51 U/L (ref 10–44)
ANION GAP SERPL CALC-SCNC: 9 MMOL/L (ref 8–16)
AST SERPL-CCNC: 46 U/L (ref 10–40)
BILIRUB SERPL-MCNC: 0.3 MG/DL (ref 0.1–1)
BUN SERPL-MCNC: 26 MG/DL (ref 8–23)
CALCIUM SERPL-MCNC: 8.8 MG/DL (ref 8.7–10.5)
CHLORIDE SERPL-SCNC: 108 MMOL/L (ref 95–110)
CHOLEST SERPL-MCNC: 213 MG/DL (ref 120–199)
CHOLEST/HDLC SERPL: 3.7 {RATIO} (ref 2–5)
CO2 SERPL-SCNC: 22 MMOL/L (ref 23–29)
CREAT SERPL-MCNC: 1.1 MG/DL (ref 0.5–1.4)
ERYTHROCYTE [DISTWIDTH] IN BLOOD BY AUTOMATED COUNT: 15 % (ref 11.5–14.5)
EST. GFR  (NO RACE VARIABLE): >60 ML/MIN/1.73 M^2
ESTIMATED AVG GLUCOSE: 160 MG/DL (ref 68–131)
GLUCOSE SERPL-MCNC: 223 MG/DL (ref 70–110)
HBA1C MFR BLD: 7.2 % (ref 4–5.6)
HCT VFR BLD AUTO: 36 % (ref 40–54)
HDLC SERPL-MCNC: 58 MG/DL (ref 40–75)
HDLC SERPL: 27.2 % (ref 20–50)
HGB BLD-MCNC: 11.1 G/DL (ref 14–18)
LDLC SERPL CALC-MCNC: 115.8 MG/DL (ref 63–159)
MCH RBC QN AUTO: 26.2 PG (ref 27–31)
MCHC RBC AUTO-ENTMCNC: 30.8 G/DL (ref 32–36)
MCV RBC AUTO: 85 FL (ref 82–98)
NONHDLC SERPL-MCNC: 155 MG/DL
PLATELET # BLD AUTO: 432 K/UL (ref 150–450)
PMV BLD AUTO: 10.9 FL (ref 9.2–12.9)
POTASSIUM SERPL-SCNC: 5 MMOL/L (ref 3.5–5.1)
PROT SERPL-MCNC: 6.4 G/DL (ref 6–8.4)
RBC # BLD AUTO: 4.24 M/UL (ref 4.6–6.2)
SODIUM SERPL-SCNC: 139 MMOL/L (ref 136–145)
TRIGL SERPL-MCNC: 196 MG/DL (ref 30–150)
WBC # BLD AUTO: 5.34 K/UL (ref 3.9–12.7)

## 2024-10-02 PROCEDURE — 83036 HEMOGLOBIN GLYCOSYLATED A1C: CPT | Performed by: INTERNAL MEDICINE

## 2024-10-02 PROCEDURE — 85027 COMPLETE CBC AUTOMATED: CPT | Performed by: INTERNAL MEDICINE

## 2024-10-02 PROCEDURE — 80061 LIPID PANEL: CPT | Performed by: INTERNAL MEDICINE

## 2024-10-02 PROCEDURE — 80053 COMPREHEN METABOLIC PANEL: CPT | Performed by: INTERNAL MEDICINE

## 2024-10-02 PROCEDURE — 99214 OFFICE O/P EST MOD 30 MIN: CPT | Mod: PBBFAC,PO | Performed by: INTERNAL MEDICINE

## 2024-10-02 PROCEDURE — 99999 PR PBB SHADOW E&M-EST. PATIENT-LVL IV: CPT | Mod: PBBFAC,,, | Performed by: INTERNAL MEDICINE

## 2024-10-02 PROCEDURE — 36415 COLL VENOUS BLD VENIPUNCTURE: CPT | Mod: PO | Performed by: INTERNAL MEDICINE

## 2024-10-02 RX ORDER — INSULIN GLARGINE 100 [IU]/ML
50 INJECTION, SOLUTION SUBCUTANEOUS NIGHTLY
Qty: 45 ML | Refills: 1 | Status: SHIPPED | OUTPATIENT
Start: 2024-10-02

## 2024-10-02 RX ORDER — INSULIN LISPRO 100 [IU]/ML
10 INJECTION, SOLUTION INTRAVENOUS; SUBCUTANEOUS
Qty: 9 ML | Refills: 3 | Status: SHIPPED | OUTPATIENT
Start: 2024-10-02

## 2024-10-02 NOTE — PROGRESS NOTES
Subjective     Mikey James is a 69 y.o. old, male here for Follow-up (6 month )    Patient reports worsening weakness and notes increasingly sedentary due to mobility issues. He requires a walker to get around.    Notes improvement when he was having physical therapy and amendable more PT. Essential oils have helped with neuropathy and LE pain.     Patient does not like to carry his insulin with him. Reports doing better with his insulin in the morning (home glucose ) but has to leave work to get his insulin mid-day.    Patient denies changes in bowel habit, constipation, changes in sweating or dizziness/lightheadedness when he stands up.    Bumex has helped with LE edema. No changes in sensation in the feet but improved sensation in the shins.      Review of Systems   Constitutional:  Negative for chills, fever, malaise/fatigue and weight loss.   Respiratory:  Negative for cough, hemoptysis and shortness of breath.    Cardiovascular:  Positive for leg swelling. Negative for chest pain and palpitations.   Gastrointestinal:  Negative for abdominal pain, blood in stool, constipation, diarrhea, nausea and vomiting.   Genitourinary:  Negative for dysuria and hematuria.   Skin:  Negative for rash.   Neurological:  Positive for tingling and weakness. Negative for dizziness, sensory change and headaches.     Medications     Outpatient Medications Marked as Taking for the 10/2/24 encounter (Office Visit) with Branden Gallardo MD   Medication Sig Dispense Refill    aspirin (ECOTRIN) 81 MG EC tablet Take 81 mg by mouth once daily.      blood sugar diagnostic (FREESTYLE LITE STRIPS) Strp USE TO CHECK BLOOD GLUCOSE ONCE DAILY 200 strip 3    blood-glucose meter kit To check BG 1 times daily, to use with insurance preferred meter. ICD10: E11. 1 each 1    blood-glucose meter,continuous (DEXCOM G6 ) Misc Use as directed 1 each 1    bumetanide (BUMEX) 0.5 MG Tab TAKE 1 TABLET DAILY 90 tablet 3    flash glucose  "scanning reader (FREESTYLE COURTNEY 14 DAY READER) Misc use As directed 2 each 3    flash glucose sensor (FREESTYLE COURTNEY 14 DAY SENSOR) Kit As directed 6 kit 3    gabapentin (NEURONTIN) 300 MG capsule Take 1 capsule (300 mg total) by mouth 3 (three) times daily. 270 capsule 3    HUMALOG KWIKPEN INSULIN 100 unit/mL pen INJECT 10 UNITS UNDER THE SKIN THREE TIMES A DAY WITH                     MEALS. 30 mL 1    insulin (LANTUS SOLOSTAR U-100 INSULIN) glargine 100 units/mL SubQ pen INJECT 52 UNITS UNDER THE SKIN ONCE DAILY 45 mL 1    lancets (FREESTYLE LANCETS) 28 gauge Misc TO CHECK BLOOD GLUCOSE ONCE DAILY 100 each 2    lisinopriL (PRINIVIL,ZESTRIL) 20 MG tablet TAKE 1 TABLET                       DAILY. 90 tablet 3    LUTEIN ORAL Take by mouth.      pantoprazole (PROTONIX) 40 MG tablet Take 1 tablet (40 mg total) by mouth once daily. 90 tablet 3    SURE COMFORT PEN NEEDLE 32 gauge x 1/4" Ndle USE TO ADMINISTER INSULIN UNDER THE SKIN FOUR TIMES A DAY (DISCARD PEN NEEDLE AFTER EACH USE) 400 each 3     Objective     /70   Pulse 66   Ht 5' 10" (1.778 m)   Wt 97.2 kg (214 lb 4.6 oz)   SpO2 98%   BMI 30.75 kg/m²   Physical Exam  Cardiovascular:      Pulses: Normal pulses.      Heart sounds: Normal heart sounds.   Pulmonary:      Effort: Pulmonary effort is normal.      Breath sounds: Normal breath sounds. No stridor. No wheezing, rhonchi or rales.   Musculoskeletal:      Right lower leg: Edema present.      Left lower leg: Edema present.      Right foot: No foot drop.      Left foot: No foot drop.   Feet:      Right foot:      Skin integrity: Skin integrity normal. No ulcer.      Left foot:      Skin integrity: Skin integrity normal. No ulcer.   Skin:     General: Skin is warm.   Neurological:      Mental Status: He is alert.      Sensory: Sensory deficit present.      Motor: Weakness present.      Comments: Decreased sensation below ankles BL       Assessment and Plan     6 month wellness  Labs today  A1c, " lipids, CMP, CBC    Type 2 diabetes mellitus with diabetic polyneuropathy, with long-term current use of insulin    Type 2 diabetes mellitus with microalbuminuria, with long-term current use of insulin    Type 2 diabetes mellitus with retinopathy of both eyes, with long-term current use of insulin, macular edema presence unspecified, unspecified retinopathy severity    Optometry visit 7/19/24, following with annual recheck  Severe retinopathy ou, retinopathy looks worse than last visit 2021 but A1c is lower, oct mac shows no mac edema, RTC 3 mos dfe and oct mac repeat     __________________________  Simona Rodríguez, MS4  -[facility]  [facility]

## 2024-10-07 DIAGNOSIS — Z79.4 TYPE 2 DIABETES MELLITUS WITH DIABETIC POLYNEUROPATHY, WITH LONG-TERM CURRENT USE OF INSULIN: ICD-10-CM

## 2024-10-07 DIAGNOSIS — E11.42 TYPE 2 DIABETES MELLITUS WITH DIABETIC POLYNEUROPATHY, WITH LONG-TERM CURRENT USE OF INSULIN: ICD-10-CM

## 2024-10-07 RX ORDER — GABAPENTIN 300 MG/1
CAPSULE ORAL
Refills: 0 | OUTPATIENT
Start: 2024-10-07

## 2024-10-07 RX ORDER — GABAPENTIN 300 MG/1
300 CAPSULE ORAL 3 TIMES DAILY
Qty: 270 CAPSULE | Refills: 1 | Status: SHIPPED | OUTPATIENT
Start: 2024-10-07

## 2024-10-07 NOTE — TELEPHONE ENCOUNTER
No care due was identified.  Health [facility] Embedded Care Due Messages. Reference number: [id number].   10/07/2024 3:47:22 PM CDT

## 2024-11-06 ENCOUNTER — PATIENT MESSAGE (OUTPATIENT)
Dept: FAMILY MEDICINE | Facility: CLINIC | Age: 69
End: 2024-11-06
Payer: MEDICARE

## 2024-11-06 NOTE — LETTER
November 7, 2024    Mikey Ramirez  [address] [facility] [address]             [facility] [address] [place] [address]  Phone: [phone]  Fax: [phone]   November 7, 2024     Patient: Mikey Ramirez   YOB: 1955   Date of Visit: 11/6/2024       To Whom it May Concern:    Mikey Ramirez is my patient.  He needs to use a walker to perform his duties at work.    If you have any questions or concerns, please don't hesitate to call.    Sincerely,         Branden Gallardo MD

## 2024-12-11 DIAGNOSIS — K21.9 GASTROESOPHAGEAL REFLUX DISEASE WITHOUT ESOPHAGITIS: ICD-10-CM

## 2024-12-11 RX ORDER — PANTOPRAZOLE SODIUM 40 MG/1
40 TABLET, DELAYED RELEASE ORAL
Qty: 90 TABLET | Refills: 3 | Status: SHIPPED | OUTPATIENT
Start: 2024-12-11

## 2024-12-11 NOTE — TELEPHONE ENCOUNTER
Refill Decision Note   Mikey Ramirez  is requesting a refill authorization.  Brief Assessment and Rationale for Refill:  Approve     Medication Therapy Plan:         Comments:     Note composed:3:55 PM 12/11/2024             Appointments     Last Visit   10/2/2024 Branden Gallardo MD   Next Visit   4/2/2025 Branden Gallardo MD

## 2024-12-11 NOTE — TELEPHONE ENCOUNTER
No care due was identified.  [facility] Embedded Care Due Messages. Reference number: [id number].   12/11/2024 9:35:39 AM CST

## 2025-02-03 ENCOUNTER — PATIENT OUTREACH (OUTPATIENT)
Dept: ADMINISTRATIVE | Facility: HOSPITAL | Age: 70
End: 2025-02-03
Payer: MEDICARE

## 2025-02-21 DIAGNOSIS — Z00.00 ENCOUNTER FOR MEDICARE ANNUAL WELLNESS EXAM: ICD-10-CM

## 2025-03-10 DIAGNOSIS — Z79.4 TYPE 2 DIABETES MELLITUS WITH MICROALBUMINURIA, WITH LONG-TERM CURRENT USE OF INSULIN: ICD-10-CM

## 2025-03-10 DIAGNOSIS — E11.29 TYPE 2 DIABETES MELLITUS WITH MICROALBUMINURIA, WITH LONG-TERM CURRENT USE OF INSULIN: ICD-10-CM

## 2025-03-10 DIAGNOSIS — R80.9 TYPE 2 DIABETES MELLITUS WITH MICROALBUMINURIA, WITH LONG-TERM CURRENT USE OF INSULIN: ICD-10-CM

## 2025-03-10 RX ORDER — LISINOPRIL 20 MG/1
20 TABLET ORAL
Qty: 90 TABLET | Refills: 1 | Status: SHIPPED | OUTPATIENT
Start: 2025-03-10

## 2025-03-10 NOTE — TELEPHONE ENCOUNTER
Care Due:                  Date            Visit Type   Department     Provider  --------------------------------------------------------------------------------                                EP -                              [facility]  Last Visit: 10-      CARE ([facility])   [place] Gallardo                              EP -                              PRIMARY      [facility] FAMILY  Next Visit: 04-      CARE ([facility])   [place] Gallardo                                                            Last  Test          Frequency    Reason                     Performed    Due Date  --------------------------------------------------------------------------------    HBA1C.......  6 months...  LANTUS, insulin..........  10-   04-    [facility] Embedded Care Due Messages. Reference number: [id number].   3/10/2025 2:09:09 AM DEBBYT

## 2025-03-10 NOTE — TELEPHONE ENCOUNTER
Provider Staff:  Action required for this patient    Requires labs      Please see care gap opportunities below in Care Due Message.    Thanks!  [facility]     Appointments      Date Provider   Last Visit   10/2/2024 Branden Gallardo MD   Next Visit   4/2/2025 Branden Gallardo MD     Refill Decision Note   Mikey Ramirez  is requesting a refill authorization.    Brief Assessment and Rationale for Refill:   Approve       Medication Therapy Plan:         Comments:     Note composed:5:45 PM 03/10/2025

## 2025-03-13 DIAGNOSIS — Z79.4 TYPE 2 DIABETES MELLITUS WITH DIABETIC POLYNEUROPATHY, WITH LONG-TERM CURRENT USE OF INSULIN: ICD-10-CM

## 2025-03-13 DIAGNOSIS — E11.42 TYPE 2 DIABETES MELLITUS WITH DIABETIC POLYNEUROPATHY, WITH LONG-TERM CURRENT USE OF INSULIN: ICD-10-CM

## 2025-03-13 RX ORDER — INSULIN GLARGINE 100 [IU]/ML
50 INJECTION, SOLUTION SUBCUTANEOUS NIGHTLY
Qty: 45 ML | Refills: 0 | Status: SHIPPED | OUTPATIENT
Start: 2025-03-13

## 2025-03-13 NOTE — TELEPHONE ENCOUNTER
No care due was identified.  [facility] Embedded Care Due Messages. Reference number: [id number].   3/13/2025 2:04:11 AM CDT

## 2025-03-13 NOTE — TELEPHONE ENCOUNTER
Refill Decision Note   Mikey Ramirez  is requesting a refill authorization.  Brief Assessment and Rationale for Refill:  Approve     Medication Therapy Plan:         Comments:     Note composed:11:43 AM 03/13/2025

## 2025-03-17 DIAGNOSIS — R60.0 BILATERAL LOWER EXTREMITY EDEMA: ICD-10-CM

## 2025-03-17 NOTE — TELEPHONE ENCOUNTER
No care due was identified.  Health [facility] Embedded Care Due Messages. Reference number: [id number].   3/17/2025 2:09:45 AM CDT

## 2025-03-18 RX ORDER — BUMETANIDE 0.5 MG/1
0.5 TABLET ORAL
Qty: 90 TABLET | Refills: 1 | Status: SHIPPED | OUTPATIENT
Start: 2025-03-18

## 2025-03-18 NOTE — TELEPHONE ENCOUNTER
Refill Decision Note   Mikey Ramirez  is requesting a refill authorization.  Brief Assessment and Rationale for Refill:  Approve     Medication Therapy Plan:         Comments:     Note composed:2:40 PM 03/18/2025

## 2025-04-02 ENCOUNTER — OFFICE VISIT (OUTPATIENT)
Dept: FAMILY MEDICINE | Facility: CLINIC | Age: 70
End: 2025-04-02
Payer: MEDICARE

## 2025-04-02 ENCOUNTER — LAB VISIT (OUTPATIENT)
Dept: LAB | Facility: HOSPITAL | Age: 70
End: 2025-04-02
Attending: INTERNAL MEDICINE
Payer: MEDICARE

## 2025-04-02 VITALS
DIASTOLIC BLOOD PRESSURE: 74 MMHG | WEIGHT: 223.13 LBS | HEART RATE: 60 BPM | BODY MASS INDEX: 32.01 KG/M2 | SYSTOLIC BLOOD PRESSURE: 132 MMHG | OXYGEN SATURATION: 100 %

## 2025-04-02 DIAGNOSIS — R80.9 TYPE 2 DIABETES MELLITUS WITH MICROALBUMINURIA, WITH LONG-TERM CURRENT USE OF INSULIN: ICD-10-CM

## 2025-04-02 DIAGNOSIS — Z79.4 TYPE 2 DIABETES MELLITUS WITH DIABETIC POLYNEUROPATHY, WITH LONG-TERM CURRENT USE OF INSULIN: Primary | ICD-10-CM

## 2025-04-02 DIAGNOSIS — Z79.4 TYPE 2 DIABETES MELLITUS WITH MICROALBUMINURIA, WITH LONG-TERM CURRENT USE OF INSULIN: ICD-10-CM

## 2025-04-02 DIAGNOSIS — I10 ESSENTIAL HYPERTENSION: ICD-10-CM

## 2025-04-02 DIAGNOSIS — E11.42 TYPE 2 DIABETES MELLITUS WITH DIABETIC POLYNEUROPATHY, WITH LONG-TERM CURRENT USE OF INSULIN: ICD-10-CM

## 2025-04-02 DIAGNOSIS — Z79.4 TYPE 2 DIABETES MELLITUS WITH DIABETIC POLYNEUROPATHY, WITH LONG-TERM CURRENT USE OF INSULIN: ICD-10-CM

## 2025-04-02 DIAGNOSIS — E11.42 TYPE 2 DIABETES MELLITUS WITH DIABETIC POLYNEUROPATHY, WITH LONG-TERM CURRENT USE OF INSULIN: Primary | ICD-10-CM

## 2025-04-02 DIAGNOSIS — R60.0 BILATERAL LOWER EXTREMITY EDEMA: ICD-10-CM

## 2025-04-02 DIAGNOSIS — E11.29 TYPE 2 DIABETES MELLITUS WITH MICROALBUMINURIA, WITH LONG-TERM CURRENT USE OF INSULIN: ICD-10-CM

## 2025-04-02 LAB
ALBUMIN SERPL BCP-MCNC: 3.1 G/DL (ref 3.5–5.2)
ANION GAP (OHS): 7 MMOL/L (ref 8–16)
BNP SERPL-MCNC: 40 PG/ML (ref 0–99)
BUN SERPL-MCNC: 16 MG/DL (ref 8–23)
CALCIUM SERPL-MCNC: 8.8 MG/DL (ref 8.7–10.5)
CHLORIDE SERPL-SCNC: 109 MMOL/L (ref 95–110)
CO2 SERPL-SCNC: 23 MMOL/L (ref 23–29)
CREAT SERPL-MCNC: 0.9 MG/DL (ref 0.5–1.4)
EAG (OHS): 160 MG/DL (ref 68–131)
GFR SERPLBLD CREATININE-BSD FMLA CKD-EPI: >60 ML/MIN/1.73/M2
GLUCOSE SERPL-MCNC: 178 MG/DL (ref 70–110)
HBA1C MFR BLD: 7.2 % (ref 4–5.6)
PHOSPHATE SERPL-MCNC: 3.8 MG/DL (ref 2.7–4.5)
POTASSIUM SERPL-SCNC: 4.6 MMOL/L (ref 3.5–5.1)
SODIUM SERPL-SCNC: 139 MMOL/L (ref 136–145)

## 2025-04-02 PROCEDURE — 83880 ASSAY OF NATRIURETIC PEPTIDE: CPT

## 2025-04-02 PROCEDURE — G2211 COMPLEX E/M VISIT ADD ON: HCPCS | Mod: S$PBB,,, | Performed by: INTERNAL MEDICINE

## 2025-04-02 PROCEDURE — 99999 PR PBB SHADOW E&M-EST. PATIENT-LVL III: CPT | Mod: PBBFAC,,, | Performed by: INTERNAL MEDICINE

## 2025-04-02 PROCEDURE — 36415 COLL VENOUS BLD VENIPUNCTURE: CPT | Mod: PO

## 2025-04-02 PROCEDURE — 80069 RENAL FUNCTION PANEL: CPT

## 2025-04-02 PROCEDURE — 99213 OFFICE O/P EST LOW 20 MIN: CPT | Mod: PBBFAC,PO | Performed by: INTERNAL MEDICINE

## 2025-04-02 PROCEDURE — 83036 HEMOGLOBIN GLYCOSYLATED A1C: CPT

## 2025-04-02 PROCEDURE — 99214 OFFICE O/P EST MOD 30 MIN: CPT | Mod: S$PBB,,, | Performed by: INTERNAL MEDICINE

## 2025-04-02 RX ORDER — LISINOPRIL 40 MG/1
40 TABLET ORAL DAILY
Qty: 90 TABLET | Refills: 3 | Status: SHIPPED | OUTPATIENT
Start: 2025-04-02

## 2025-04-02 NOTE — PROGRESS NOTES
Subjective     Mikey Ramirez is a 69 y.o. old, male here for Follow-up    69 year-old with PMH of Type 2 IDDM with neuropathy/retinopathy, HTN, HLD, diabetic amyotrophy     IDDM: He continues on lantus daily with mealtime lispro. He is wearing his CGM regularly. 7 day av. 30 day av. 3 lows the past week, 12 the past month, usually at night.    HTN: SBP's 130's-140's at home.  HLD: on statin therapy, labs reviewed.    He tried to stop gabapentin for neuropathy but it worsened and he couldn't sleep so he resumed it.    His lower extremity edema has worsened. The bumetanide at its current dose doesn't seem to cause much diuresis.    ROS  Medications     Outpatient Medications Marked as Taking for the 25 encounter (Office Visit) with Branden Gallardo MD   Medication Sig Dispense Refill    aspirin (ECOTRIN) 81 MG EC tablet Take 81 mg by mouth once daily.      blood sugar diagnostic (FREESTYLE LITE STRIPS) Strp USE TO CHECK BLOOD GLUCOSE ONCE DAILY 200 strip 3    blood-glucose meter kit To check BG 1 times daily, to use with insurance preferred meter. ICD10: E11. 1 each 1    blood-glucose meter,continuous (DEXCOM G6 ) Misc Use as directed 1 each 1    bumetanide (BUMEX) 0.5 MG Tab TAKE 1 TABLET DAILY 90 tablet 1    flash glucose scanning reader (FREESTYLE COURTNEY 14 DAY READER) Misc use As directed 2 each 3    flash glucose sensor (FREESTYLE COURTNEY 14 DAY SENSOR) Kit As directed 6 kit 3    gabapentin (NEURONTIN) 300 MG capsule Take 1 capsule (300 mg total) by mouth 3 (three) times daily. 270 capsule 1    insulin lispro (HUMALOG KWIKPEN INSULIN) 100 unit/mL pen Inject 10 Units into the skin before dinner. 9 mL 3    lancets (FREESTYLE LANCETS) 28 gauge Misc TO CHECK BLOOD GLUCOSE ONCE DAILY 100 each 2    LANTUS SOLOSTAR U-100 INSULIN 100 unit/mL (3 mL) InPn pen INJECT 50 UNITS UNDER THE SKIN EVERY EVENING 45 mL 0    LUTEIN ORAL Take by mouth.      pantoprazole (PROTONIX) 40 MG tablet TAKE 1 TABLET  "DAILY 90 tablet 3    SURE COMFORT PEN NEEDLE 32 gauge x 1/4" Ndle USE TO ADMINISTER INSULIN UNDER THE SKIN FOUR TIMES A DAY (DISCARD PEN NEEDLE AFTER EACH USE) 400 each 3    [DISCONTINUED] lisinopriL (PRINIVIL,ZESTRIL) 20 MG tablet TAKE 1 TABLET DAILY 90 tablet 1     Objective     /74   Pulse 60   Wt 101.2 kg (223 lb 1.7 oz)   SpO2 100%   BMI 32.01 kg/m²   Physical Exam  Constitutional:       General: He is not in acute distress.     Appearance: Normal appearance. He is well-developed.   Musculoskeletal:      Right lower leg: Edema present.      Left lower leg: Edema present.   Neurological:      Mental Status: He is alert.       Assessment and Plan     Type 2 diabetes mellitus with diabetic polyneuropathy, with long-term current use of insulin  -     Hemoglobin A1C; Future; Expected date: 04/02/2025  -     RENAL FUNCTION PANEL; Future; Expected date: 04/02/2025    Type 2 diabetes mellitus with microalbuminuria, with long-term current use of insulin  -     lisinopriL (PRINIVIL,ZESTRIL) 40 MG tablet; Take 1 tablet (40 mg total) by mouth once daily.  Dispense: 90 tablet; Refill: 3    Essential hypertension    Bilateral lower extremity edema  -     BNP; Future; Expected date: 04/02/2025      Recheck labs. Increase lisinopril. Consider starting HCTZ or K sparing diuretic vs increasing bumetanide.  ___________________  Branden Gallardo MD  Internal Medicine and Pediatrics  "

## 2025-04-04 ENCOUNTER — RESULTS FOLLOW-UP (OUTPATIENT)
Dept: FAMILY MEDICINE | Facility: CLINIC | Age: 70
End: 2025-04-04

## 2025-04-04 ENCOUNTER — PATIENT MESSAGE (OUTPATIENT)
Dept: FAMILY MEDICINE | Facility: CLINIC | Age: 70
End: 2025-04-04
Payer: MEDICARE

## 2025-04-04 RX ORDER — TRIAMTERENE/HYDROCHLOROTHIAZID 37.5-25 MG
1 TABLET ORAL DAILY
Qty: 90 TABLET | Refills: 3 | Status: SHIPPED | OUTPATIENT
Start: 2025-04-04 | End: 2026-04-04

## 2025-04-30 ENCOUNTER — PATIENT MESSAGE (OUTPATIENT)
Dept: FAMILY MEDICINE | Facility: CLINIC | Age: 70
End: 2025-04-30
Payer: MEDICARE

## 2025-05-01 RX ORDER — BLOOD-GLUCOSE SENSOR
EACH MISCELLANEOUS
Qty: 5 EACH | Refills: 5 | Status: SHIPPED | OUTPATIENT
Start: 2025-05-01

## 2025-05-01 NOTE — TELEPHONE ENCOUNTER
I sent in a new Rx to his pharmacy. I don't know what [facility] is. If it is somewhere else then ask for their fax number.

## 2025-07-14 DIAGNOSIS — Z79.4 TYPE 2 DIABETES MELLITUS WITH DIABETIC POLYNEUROPATHY, WITH LONG-TERM CURRENT USE OF INSULIN: ICD-10-CM

## 2025-07-14 DIAGNOSIS — E11.42 TYPE 2 DIABETES MELLITUS WITH DIABETIC POLYNEUROPATHY, WITH LONG-TERM CURRENT USE OF INSULIN: ICD-10-CM

## 2025-07-14 NOTE — TELEPHONE ENCOUNTER
Care Due:                  Date            Visit Type   Department     Provider  --------------------------------------------------------------------------------                                EP -                              [facility]  Last Visit: 04-      CARE ([facility])   [place] Gallardo                              EP -                              PRIMARY      [facility] FAM[facility]  Next Visit: 10-      CARE ([facility])   [place] Gallardo                                                            Last  Test          Frequency    Reason                     Performed    Due Date  --------------------------------------------------------------------------------    HBA1C.......  6 months...  LANTUS, insulin..........  04- 09-    [facility] Embedded Care Due Messages. Reference number: [id number].   7/14/2025 2:20:09 PM CDT

## 2025-07-15 RX ORDER — GABAPENTIN 300 MG/1
300 CAPSULE ORAL 3 TIMES DAILY
Qty: 270 CAPSULE | Refills: 3 | Status: SHIPPED | OUTPATIENT
Start: 2025-07-15

## 2025-07-15 RX ORDER — INSULIN GLARGINE 100 [IU]/ML
50 INJECTION, SOLUTION SUBCUTANEOUS NIGHTLY
Qty: 45 ML | Refills: 0 | Status: SHIPPED | OUTPATIENT
Start: 2025-07-15

## 2025-07-15 NOTE — TELEPHONE ENCOUNTER
Refill Routing Note   Medication(s) are not appropriate for processing by [facility] for the following reason(s):        Outside of protocol    [facility] action(s):  Route  Approve     Requires labs : Yes             Appointments  past 12m or future 3m with PCP    Date Provider   Last Visit   4/2/2025 Branden Gallardo MD   Next Visit   10/2/2025 Branden Gallardo MD   ED visits in past 90 days: 0        Note composed:2:04 PM 07/15/2025

## 2025-08-19 ENCOUNTER — PATIENT MESSAGE (OUTPATIENT)
Dept: ADMINISTRATIVE | Facility: HOSPITAL | Age: 70
End: 2025-08-19
Payer: MEDICARE